# Patient Record
Sex: FEMALE | HISPANIC OR LATINO | Employment: OTHER | URBAN - METROPOLITAN AREA
[De-identification: names, ages, dates, MRNs, and addresses within clinical notes are randomized per-mention and may not be internally consistent; named-entity substitution may affect disease eponyms.]

---

## 2017-01-12 ENCOUNTER — COMMUNICATION - HEALTHEAST (OUTPATIENT)
Dept: INTERNAL MEDICINE | Facility: CLINIC | Age: 62
End: 2017-01-12

## 2017-01-12 DIAGNOSIS — I10 HTN (HYPERTENSION): ICD-10-CM

## 2017-03-21 ENCOUNTER — COMMUNICATION - HEALTHEAST (OUTPATIENT)
Dept: INTERNAL MEDICINE | Facility: CLINIC | Age: 62
End: 2017-03-21

## 2017-03-21 DIAGNOSIS — K21.9 GERD (GASTROESOPHAGEAL REFLUX DISEASE): ICD-10-CM

## 2017-05-31 ENCOUNTER — COMMUNICATION - HEALTHEAST (OUTPATIENT)
Dept: INTERNAL MEDICINE | Facility: CLINIC | Age: 62
End: 2017-05-31

## 2017-07-24 ENCOUNTER — COMMUNICATION - HEALTHEAST (OUTPATIENT)
Dept: INTERNAL MEDICINE | Facility: CLINIC | Age: 62
End: 2017-07-24

## 2017-07-25 ENCOUNTER — OFFICE VISIT - HEALTHEAST (OUTPATIENT)
Dept: INTERNAL MEDICINE | Facility: CLINIC | Age: 62
End: 2017-07-25

## 2017-07-25 ENCOUNTER — AMBULATORY - HEALTHEAST (OUTPATIENT)
Dept: INTERNAL MEDICINE | Facility: CLINIC | Age: 62
End: 2017-07-25

## 2017-07-25 ENCOUNTER — COMMUNICATION - HEALTHEAST (OUTPATIENT)
Dept: INTERNAL MEDICINE | Facility: CLINIC | Age: 62
End: 2017-07-25

## 2017-07-25 ENCOUNTER — HOSPITAL ENCOUNTER (OUTPATIENT)
Dept: ULTRASOUND IMAGING | Facility: CLINIC | Age: 62
Discharge: HOME | End: 2017-07-25
Attending: INTERNAL MEDICINE

## 2017-07-25 DIAGNOSIS — K80.20 CHOLELITHIASES: ICD-10-CM

## 2017-07-25 DIAGNOSIS — R10.11 RUQ PAIN: ICD-10-CM

## 2017-07-25 DIAGNOSIS — K21.9 GASTROESOPHAGEAL REFLUX DISEASE WITHOUT ESOPHAGITIS: ICD-10-CM

## 2017-07-25 DIAGNOSIS — K80.20 CHOLELITHIASIS: ICD-10-CM

## 2017-07-25 ASSESSMENT — MIFFLIN-ST. JEOR: SCORE: 1617.3

## 2017-07-26 ENCOUNTER — COMMUNICATION - HEALTHEAST (OUTPATIENT)
Dept: INTERNAL MEDICINE | Facility: CLINIC | Age: 62
End: 2017-07-26

## 2017-07-31 ENCOUNTER — RECORDS - HEALTHEAST (OUTPATIENT)
Dept: ADMINISTRATIVE | Facility: OTHER | Age: 62
End: 2017-07-31

## 2017-08-02 ENCOUNTER — SURGERY - HEALTHEAST (OUTPATIENT)
Dept: SURGERY | Facility: CLINIC | Age: 62
End: 2017-08-02

## 2017-08-02 ENCOUNTER — ANESTHESIA - HEALTHEAST (OUTPATIENT)
Dept: SURGERY | Facility: CLINIC | Age: 62
End: 2017-08-02

## 2017-08-02 ASSESSMENT — MIFFLIN-ST. JEOR: SCORE: 1594.62

## 2017-10-09 ENCOUNTER — COMMUNICATION - HEALTHEAST (OUTPATIENT)
Dept: INTERNAL MEDICINE | Facility: CLINIC | Age: 62
End: 2017-10-09

## 2017-10-20 ENCOUNTER — COMMUNICATION - HEALTHEAST (OUTPATIENT)
Dept: INTERNAL MEDICINE | Facility: CLINIC | Age: 62
End: 2017-10-20

## 2017-10-20 DIAGNOSIS — I10 HYPERTENSION: ICD-10-CM

## 2017-11-10 ENCOUNTER — RECORDS - HEALTHEAST (OUTPATIENT)
Dept: MAMMOGRAPHY | Facility: CLINIC | Age: 62
End: 2017-11-10

## 2017-11-10 ENCOUNTER — OFFICE VISIT - HEALTHEAST (OUTPATIENT)
Dept: INTERNAL MEDICINE | Facility: CLINIC | Age: 62
End: 2017-11-10

## 2017-11-10 DIAGNOSIS — Z00.00 HEALTH CARE MAINTENANCE: ICD-10-CM

## 2017-11-10 DIAGNOSIS — E78.5 HYPERLIPIDEMIA: ICD-10-CM

## 2017-11-10 DIAGNOSIS — Z12.31 ENCOUNTER FOR SCREENING MAMMOGRAM FOR MALIGNANT NEOPLASM OF BREAST: ICD-10-CM

## 2017-11-10 DIAGNOSIS — E55.9 VITAMIN D DEFICIENCY: ICD-10-CM

## 2017-11-10 DIAGNOSIS — I10 ESSENTIAL HYPERTENSION: ICD-10-CM

## 2017-11-10 LAB
CHOLEST SERPL-MCNC: 256 MG/DL
FASTING STATUS PATIENT QL REPORTED: YES
HDLC SERPL-MCNC: 55 MG/DL
LDLC SERPL CALC-MCNC: 168 MG/DL
TRIGL SERPL-MCNC: 165 MG/DL

## 2017-11-10 ASSESSMENT — MIFFLIN-ST. JEOR: SCORE: 1614.69

## 2018-02-12 ENCOUNTER — TELEPHONE (OUTPATIENT)
Dept: OTHER | Facility: CLINIC | Age: 63
End: 2018-02-12

## 2018-02-12 NOTE — TELEPHONE ENCOUNTER
2/12/2018    Call Regarding Onboarding P1 MMB    Attempt 1    Message on answering machine    Comments:       Outreach   SB

## 2018-02-22 ENCOUNTER — COMMUNICATION - HEALTHEAST (OUTPATIENT)
Dept: INTERNAL MEDICINE | Facility: CLINIC | Age: 63
End: 2018-02-22

## 2018-02-23 ENCOUNTER — COMMUNICATION - HEALTHEAST (OUTPATIENT)
Dept: INTERNAL MEDICINE | Facility: CLINIC | Age: 63
End: 2018-02-23

## 2018-03-20 NOTE — TELEPHONE ENCOUNTER
3/20/2018    Call Regarding Onboarding P1 MMB    Attempt 2    Message on voicemail     Comments:       Outreach   Dariana Schroeder

## 2018-04-19 NOTE — TELEPHONE ENCOUNTER
4/19/2018      Call Regarding Onboarding P1 MMB     Attempt 3     Message on voicemail      Comments:       Outreach   BELKIS FOX

## 2018-07-13 ENCOUNTER — OFFICE VISIT - HEALTHEAST (OUTPATIENT)
Dept: FAMILY MEDICINE | Facility: CLINIC | Age: 63
End: 2018-07-13

## 2018-07-13 DIAGNOSIS — M25.572 PAIN IN JOINT INVOLVING ANKLE AND FOOT, LEFT: ICD-10-CM

## 2018-07-21 ENCOUNTER — COMMUNICATION - HEALTHEAST (OUTPATIENT)
Dept: INTERNAL MEDICINE | Facility: CLINIC | Age: 63
End: 2018-07-21

## 2018-07-21 DIAGNOSIS — I10 ESSENTIAL HYPERTENSION: ICD-10-CM

## 2018-07-31 ENCOUNTER — OFFICE VISIT - HEALTHEAST (OUTPATIENT)
Dept: INTERNAL MEDICINE | Facility: CLINIC | Age: 63
End: 2018-07-31

## 2018-07-31 DIAGNOSIS — M25.571 PAIN IN JOINT INVOLVING ANKLE AND FOOT, RIGHT: ICD-10-CM

## 2018-08-15 ENCOUNTER — RECORDS - HEALTHEAST (OUTPATIENT)
Dept: ADMINISTRATIVE | Facility: OTHER | Age: 63
End: 2018-08-15

## 2018-09-29 ENCOUNTER — COMMUNICATION - HEALTHEAST (OUTPATIENT)
Dept: INTERNAL MEDICINE | Facility: CLINIC | Age: 63
End: 2018-09-29

## 2018-09-29 DIAGNOSIS — K21.9 GASTROESOPHAGEAL REFLUX DISEASE WITHOUT ESOPHAGITIS: ICD-10-CM

## 2018-10-22 ENCOUNTER — COMMUNICATION - HEALTHEAST (OUTPATIENT)
Dept: INTERNAL MEDICINE | Facility: CLINIC | Age: 63
End: 2018-10-22

## 2018-11-27 ENCOUNTER — RECORDS - HEALTHEAST (OUTPATIENT)
Dept: MAMMOGRAPHY | Facility: CLINIC | Age: 63
End: 2018-11-27

## 2018-11-27 ENCOUNTER — OFFICE VISIT - HEALTHEAST (OUTPATIENT)
Dept: INTERNAL MEDICINE | Facility: CLINIC | Age: 63
End: 2018-11-27

## 2018-11-27 DIAGNOSIS — E78.5 HYPERLIPIDEMIA LDL GOAL <130: ICD-10-CM

## 2018-11-27 DIAGNOSIS — Z00.00 ANNUAL PHYSICAL EXAM: ICD-10-CM

## 2018-11-27 DIAGNOSIS — K21.9 GASTROESOPHAGEAL REFLUX DISEASE WITHOUT ESOPHAGITIS: ICD-10-CM

## 2018-11-27 DIAGNOSIS — E55.9 VITAMIN D DEFICIENCY: ICD-10-CM

## 2018-11-27 DIAGNOSIS — I10 ESSENTIAL HYPERTENSION: ICD-10-CM

## 2018-11-27 DIAGNOSIS — Z12.31 ENCOUNTER FOR SCREENING MAMMOGRAM FOR MALIGNANT NEOPLASM OF BREAST: ICD-10-CM

## 2018-11-29 ENCOUNTER — AMBULATORY - HEALTHEAST (OUTPATIENT)
Dept: LAB | Facility: CLINIC | Age: 63
End: 2018-11-29

## 2018-11-29 DIAGNOSIS — I10 ESSENTIAL HYPERTENSION: ICD-10-CM

## 2018-11-29 DIAGNOSIS — K21.9 GASTROESOPHAGEAL REFLUX DISEASE WITHOUT ESOPHAGITIS: ICD-10-CM

## 2018-11-29 DIAGNOSIS — E78.5 HYPERLIPIDEMIA LDL GOAL <130: ICD-10-CM

## 2018-11-29 DIAGNOSIS — E55.9 VITAMIN D DEFICIENCY: ICD-10-CM

## 2018-11-29 LAB
ALBUMIN SERPL-MCNC: 3.5 G/DL (ref 3.5–5)
ALP SERPL-CCNC: 102 U/L (ref 45–120)
ALT SERPL W P-5'-P-CCNC: 15 U/L (ref 0–45)
ANION GAP SERPL CALCULATED.3IONS-SCNC: 11 MMOL/L (ref 5–18)
AST SERPL W P-5'-P-CCNC: 15 U/L (ref 0–40)
BILIRUB SERPL-MCNC: 0.6 MG/DL (ref 0–1)
BUN SERPL-MCNC: 28 MG/DL (ref 8–22)
CALCIUM SERPL-MCNC: 9.9 MG/DL (ref 8.5–10.5)
CHLORIDE BLD-SCNC: 106 MMOL/L (ref 98–107)
CHOLEST SERPL-MCNC: 253 MG/DL
CO2 SERPL-SCNC: 24 MMOL/L (ref 22–31)
CREAT SERPL-MCNC: 1.06 MG/DL (ref 0.6–1.1)
ERYTHROCYTE [DISTWIDTH] IN BLOOD BY AUTOMATED COUNT: 10.9 % (ref 11–14.5)
FASTING STATUS PATIENT QL REPORTED: YES
GFR SERPL CREATININE-BSD FRML MDRD: 53 ML/MIN/1.73M2
GLUCOSE BLD-MCNC: 92 MG/DL (ref 70–125)
HCT VFR BLD AUTO: 35.2 % (ref 35–47)
HDLC SERPL-MCNC: 66 MG/DL
HGB BLD-MCNC: 11.9 G/DL (ref 12–16)
LDLC SERPL CALC-MCNC: 165 MG/DL
MCH RBC QN AUTO: 30.8 PG (ref 27–34)
MCHC RBC AUTO-ENTMCNC: 33.9 G/DL (ref 32–36)
MCV RBC AUTO: 91 FL (ref 80–100)
PLATELET # BLD AUTO: 277 THOU/UL (ref 140–440)
PMV BLD AUTO: 7.7 FL (ref 7–10)
POTASSIUM BLD-SCNC: 3.7 MMOL/L (ref 3.5–5)
PROT SERPL-MCNC: 6.8 G/DL (ref 6–8)
RBC # BLD AUTO: 3.87 MILL/UL (ref 3.8–5.4)
SODIUM SERPL-SCNC: 141 MMOL/L (ref 136–145)
TRIGL SERPL-MCNC: 109 MG/DL
TSH SERPL DL<=0.005 MIU/L-ACNC: 1.17 UIU/ML (ref 0.3–5)
VIT B12 SERPL-MCNC: 229 PG/ML (ref 213–816)
WBC: 6.1 THOU/UL (ref 4–11)

## 2018-11-30 ENCOUNTER — COMMUNICATION - HEALTHEAST (OUTPATIENT)
Dept: INTERNAL MEDICINE | Facility: CLINIC | Age: 63
End: 2018-11-30

## 2018-11-30 DIAGNOSIS — D64.9 ANEMIA, UNSPECIFIED TYPE: ICD-10-CM

## 2018-11-30 LAB
25(OH)D3 SERPL-MCNC: 42.1 NG/ML (ref 30–80)
25(OH)D3 SERPL-MCNC: 42.1 NG/ML (ref 30–80)

## 2018-12-05 ENCOUNTER — HOSPITAL ENCOUNTER (OUTPATIENT)
Dept: MAMMOGRAPHY | Facility: CLINIC | Age: 63
Discharge: HOME | End: 2018-12-05
Attending: INTERNAL MEDICINE

## 2018-12-05 DIAGNOSIS — N63.0 BREAST MASS: ICD-10-CM

## 2018-12-23 ENCOUNTER — OFFICE VISIT - HEALTHEAST (OUTPATIENT)
Dept: FAMILY MEDICINE | Facility: CLINIC | Age: 63
End: 2018-12-23

## 2018-12-23 DIAGNOSIS — B02.23 SHINGLES (HERPES ZOSTER) POLYNEUROPATHY: ICD-10-CM

## 2019-10-20 ENCOUNTER — COMMUNICATION - HEALTHEAST (OUTPATIENT)
Dept: INTERNAL MEDICINE | Facility: CLINIC | Age: 64
End: 2019-10-20

## 2019-10-20 DIAGNOSIS — K21.9 GASTROESOPHAGEAL REFLUX DISEASE WITHOUT ESOPHAGITIS: ICD-10-CM

## 2019-11-04 ENCOUNTER — COMMUNICATION - HEALTHEAST (OUTPATIENT)
Dept: INTERNAL MEDICINE | Facility: CLINIC | Age: 64
End: 2019-11-04

## 2019-11-05 ENCOUNTER — OFFICE VISIT - HEALTHEAST (OUTPATIENT)
Dept: AUDIOLOGY | Facility: CLINIC | Age: 64
End: 2019-11-05

## 2019-11-05 DIAGNOSIS — H90.3 SENSORINEURAL HEARING LOSS, BILATERAL: ICD-10-CM

## 2019-11-06 ENCOUNTER — COMMUNICATION - HEALTHEAST (OUTPATIENT)
Dept: INTERNAL MEDICINE | Facility: CLINIC | Age: 64
End: 2019-11-06

## 2019-11-06 DIAGNOSIS — I10 ESSENTIAL HYPERTENSION: ICD-10-CM

## 2019-11-11 ENCOUNTER — OFFICE VISIT - HEALTHEAST (OUTPATIENT)
Dept: INTERNAL MEDICINE | Facility: CLINIC | Age: 64
End: 2019-11-11

## 2019-11-11 DIAGNOSIS — G89.29 CHRONIC PAIN OF LEFT KNEE: ICD-10-CM

## 2019-11-11 DIAGNOSIS — K21.9 GASTROESOPHAGEAL REFLUX DISEASE WITHOUT ESOPHAGITIS: ICD-10-CM

## 2019-11-11 DIAGNOSIS — M25.562 CHRONIC PAIN OF LEFT KNEE: ICD-10-CM

## 2019-11-11 DIAGNOSIS — E55.9 VITAMIN D DEFICIENCY: ICD-10-CM

## 2019-11-11 DIAGNOSIS — E78.5 HYPERLIPIDEMIA LDL GOAL <130: ICD-10-CM

## 2019-11-11 DIAGNOSIS — I10 ESSENTIAL HYPERTENSION: ICD-10-CM

## 2019-11-11 DIAGNOSIS — Z00.00 ANNUAL PHYSICAL EXAM: ICD-10-CM

## 2019-11-11 DIAGNOSIS — D64.9 ANEMIA, UNSPECIFIED TYPE: ICD-10-CM

## 2019-11-11 DIAGNOSIS — Z23 NEED FOR VACCINATION: ICD-10-CM

## 2019-11-11 LAB
ALBUMIN SERPL-MCNC: 3.7 G/DL (ref 3.5–5)
ALP SERPL-CCNC: 108 U/L (ref 45–120)
ALT SERPL W P-5'-P-CCNC: 12 U/L (ref 0–45)
ANION GAP SERPL CALCULATED.3IONS-SCNC: 10 MMOL/L (ref 5–18)
AST SERPL W P-5'-P-CCNC: 16 U/L (ref 0–40)
BASOPHILS # BLD AUTO: 0.1 THOU/UL (ref 0–0.2)
BASOPHILS NFR BLD AUTO: 1 % (ref 0–2)
BILIRUB SERPL-MCNC: 0.5 MG/DL (ref 0–1)
BUN SERPL-MCNC: 27 MG/DL (ref 8–22)
CALCIUM SERPL-MCNC: 10 MG/DL (ref 8.5–10.5)
CHLORIDE BLD-SCNC: 107 MMOL/L (ref 98–107)
CHOLEST SERPL-MCNC: 262 MG/DL
CO2 SERPL-SCNC: 24 MMOL/L (ref 22–31)
CREAT SERPL-MCNC: 1.03 MG/DL (ref 0.6–1.1)
EOSINOPHIL # BLD AUTO: 0.3 THOU/UL (ref 0–0.4)
EOSINOPHIL NFR BLD AUTO: 3 % (ref 0–6)
ERYTHROCYTE [DISTWIDTH] IN BLOOD BY AUTOMATED COUNT: 10.8 % (ref 11–14.5)
FASTING STATUS PATIENT QL REPORTED: YES
GFR SERPL CREATININE-BSD FRML MDRD: 54 ML/MIN/1.73M2
GLUCOSE BLD-MCNC: 98 MG/DL (ref 70–125)
HCT VFR BLD AUTO: 38.1 % (ref 35–47)
HDLC SERPL-MCNC: 61 MG/DL
HGB BLD-MCNC: 12.7 G/DL (ref 12–16)
IRON SATN MFR SERPL: 37 % (ref 20–50)
IRON SERPL-MCNC: 117 UG/DL (ref 42–175)
LDLC SERPL CALC-MCNC: 173 MG/DL
LYMPHOCYTES # BLD AUTO: 2.2 THOU/UL (ref 0.8–4.4)
LYMPHOCYTES NFR BLD AUTO: 26 % (ref 20–40)
MCH RBC QN AUTO: 31 PG (ref 27–34)
MCHC RBC AUTO-ENTMCNC: 33.5 G/DL (ref 32–36)
MCV RBC AUTO: 93 FL (ref 80–100)
MONOCYTES # BLD AUTO: 0.5 THOU/UL (ref 0–0.9)
MONOCYTES NFR BLD AUTO: 5 % (ref 2–10)
NEUTROPHILS # BLD AUTO: 5.5 THOU/UL (ref 2–7.7)
NEUTROPHILS NFR BLD AUTO: 64 % (ref 50–70)
PLATELET # BLD AUTO: 270 THOU/UL (ref 140–440)
PMV BLD AUTO: 7.7 FL (ref 7–10)
POTASSIUM BLD-SCNC: 4 MMOL/L (ref 3.5–5)
PROT SERPL-MCNC: 7.6 G/DL (ref 6–8)
RBC # BLD AUTO: 4.11 MILL/UL (ref 3.8–5.4)
SODIUM SERPL-SCNC: 141 MMOL/L (ref 136–145)
TIBC SERPL-MCNC: 319 UG/DL (ref 313–563)
TRANSFERRIN SERPL-MCNC: 255 MG/DL (ref 212–360)
TRIGL SERPL-MCNC: 140 MG/DL
VIT B12 SERPL-MCNC: 558 PG/ML (ref 213–816)
WBC: 8.5 THOU/UL (ref 4–11)

## 2019-11-11 ASSESSMENT — MIFFLIN-ST. JEOR: SCORE: 1568.65

## 2019-11-12 LAB
25(OH)D3 SERPL-MCNC: 35.9 NG/ML (ref 30–80)
25(OH)D3 SERPL-MCNC: 35.9 NG/ML (ref 30–80)

## 2019-11-18 ENCOUNTER — COMMUNICATION - HEALTHEAST (OUTPATIENT)
Dept: INTERNAL MEDICINE | Facility: CLINIC | Age: 64
End: 2019-11-18

## 2019-11-18 DIAGNOSIS — E78.5 HYPERLIPIDEMIA LDL GOAL <130: ICD-10-CM

## 2019-11-18 RX ORDER — ATORVASTATIN CALCIUM 10 MG/1
10 TABLET, FILM COATED ORAL DAILY
Qty: 30 TABLET | Refills: 11 | Status: SHIPPED | OUTPATIENT
Start: 2019-11-18 | End: 2021-10-25

## 2019-11-25 ENCOUNTER — RECORDS - HEALTHEAST (OUTPATIENT)
Dept: ADMINISTRATIVE | Facility: OTHER | Age: 64
End: 2019-11-25

## 2019-11-29 ENCOUNTER — HOSPITAL ENCOUNTER (OUTPATIENT)
Dept: MAMMOGRAPHY | Facility: CLINIC | Age: 64
Discharge: HOME | End: 2019-11-29
Attending: INTERNAL MEDICINE

## 2019-11-29 DIAGNOSIS — Z12.31 VISIT FOR SCREENING MAMMOGRAM: ICD-10-CM

## 2020-01-08 ENCOUNTER — COMMUNICATION - HEALTHEAST (OUTPATIENT)
Dept: INTERNAL MEDICINE | Facility: CLINIC | Age: 65
End: 2020-01-08

## 2020-01-09 ENCOUNTER — COMMUNICATION - HEALTHEAST (OUTPATIENT)
Dept: INTERNAL MEDICINE | Facility: CLINIC | Age: 65
End: 2020-01-09

## 2020-01-14 ENCOUNTER — OFFICE VISIT - HEALTHEAST (OUTPATIENT)
Dept: FAMILY MEDICINE | Facility: CLINIC | Age: 65
End: 2020-01-14

## 2020-01-14 ENCOUNTER — COMMUNICATION - HEALTHEAST (OUTPATIENT)
Dept: SCHEDULING | Facility: CLINIC | Age: 65
End: 2020-01-14

## 2020-01-14 DIAGNOSIS — J20.9 ACUTE BRONCHITIS, UNSPECIFIED ORGANISM: ICD-10-CM

## 2020-01-14 RX ORDER — ALBUTEROL SULFATE 90 UG/1
2 AEROSOL, METERED RESPIRATORY (INHALATION) EVERY 6 HOURS PRN
Qty: 1 EACH | Refills: 0 | Status: SHIPPED | OUTPATIENT
Start: 2020-01-14 | End: 2023-01-23 | Stop reason: ALTCHOICE

## 2020-01-14 RX ORDER — BENZONATATE 100 MG/1
100 CAPSULE ORAL 3 TIMES DAILY PRN
Qty: 30 CAPSULE | Refills: 0 | Status: SHIPPED | OUTPATIENT
Start: 2020-01-14 | End: 2021-10-20

## 2020-01-28 ENCOUNTER — COMMUNICATION - HEALTHEAST (OUTPATIENT)
Dept: INTERNAL MEDICINE | Facility: CLINIC | Age: 65
End: 2020-01-28

## 2020-01-28 DIAGNOSIS — E78.5 HYPERLIPIDEMIA LDL GOAL <130: ICD-10-CM

## 2020-01-28 DIAGNOSIS — M79.10 MUSCLE ACHE: ICD-10-CM

## 2020-01-30 ENCOUNTER — AMBULATORY - HEALTHEAST (OUTPATIENT)
Dept: LAB | Facility: CLINIC | Age: 65
End: 2020-01-30

## 2020-01-30 DIAGNOSIS — E78.5 HYPERLIPIDEMIA LDL GOAL <130: ICD-10-CM

## 2020-01-30 DIAGNOSIS — M79.10 MUSCLE ACHE: ICD-10-CM

## 2020-01-31 LAB
ALBUMIN SERPL-MCNC: 3.7 G/DL (ref 3.5–5)
ALP SERPL-CCNC: 115 U/L (ref 45–120)
ALT SERPL W P-5'-P-CCNC: 10 U/L (ref 0–45)
AST SERPL W P-5'-P-CCNC: 12 U/L (ref 0–40)
BILIRUB DIRECT SERPL-MCNC: 0.1 MG/DL
BILIRUB SERPL-MCNC: 0.3 MG/DL (ref 0–1)
CK SERPL-CCNC: 121 U/L (ref 30–190)
PROT SERPL-MCNC: 7.2 G/DL (ref 6–8)
TSH SERPL DL<=0.005 MIU/L-ACNC: 0.89 UIU/ML (ref 0.3–5)

## 2020-02-05 ENCOUNTER — COMMUNICATION - HEALTHEAST (OUTPATIENT)
Dept: INTERNAL MEDICINE | Facility: CLINIC | Age: 65
End: 2020-02-05

## 2020-02-05 DIAGNOSIS — I10 ESSENTIAL HYPERTENSION: ICD-10-CM

## 2020-03-05 ENCOUNTER — COMMUNICATION - HEALTHEAST (OUTPATIENT)
Dept: SCHEDULING | Facility: CLINIC | Age: 65
End: 2020-03-05

## 2020-03-05 ENCOUNTER — COMMUNICATION - HEALTHEAST (OUTPATIENT)
Dept: INTERNAL MEDICINE | Facility: CLINIC | Age: 65
End: 2020-03-05

## 2020-03-05 ENCOUNTER — OFFICE VISIT - HEALTHEAST (OUTPATIENT)
Dept: INTERNAL MEDICINE | Facility: CLINIC | Age: 65
End: 2020-03-05

## 2020-03-05 DIAGNOSIS — R19.7 DIARRHEA, UNSPECIFIED TYPE: ICD-10-CM

## 2020-03-05 DIAGNOSIS — I10 ESSENTIAL HYPERTENSION: ICD-10-CM

## 2020-03-05 DIAGNOSIS — R11.2 NAUSEA AND VOMITING, INTRACTABILITY OF VOMITING NOT SPECIFIED, UNSPECIFIED VOMITING TYPE: ICD-10-CM

## 2020-03-05 LAB
ANION GAP SERPL CALCULATED.3IONS-SCNC: 14 MMOL/L (ref 5–18)
BUN SERPL-MCNC: 26 MG/DL (ref 8–22)
CALCIUM SERPL-MCNC: 9.4 MG/DL (ref 8.5–10.5)
CHLORIDE BLD-SCNC: 108 MMOL/L (ref 98–107)
CO2 SERPL-SCNC: 18 MMOL/L (ref 22–31)
CREAT SERPL-MCNC: 1.42 MG/DL (ref 0.6–1.1)
GFR SERPL CREATININE-BSD FRML MDRD: 37 ML/MIN/1.73M2
GLUCOSE BLD-MCNC: 94 MG/DL (ref 70–125)
POTASSIUM BLD-SCNC: 3.2 MMOL/L (ref 3.5–5)
SODIUM SERPL-SCNC: 140 MMOL/L (ref 136–145)

## 2020-03-05 ASSESSMENT — MIFFLIN-ST. JEOR: SCORE: 1528.28

## 2020-03-06 ENCOUNTER — COMMUNICATION - HEALTHEAST (OUTPATIENT)
Dept: INTERNAL MEDICINE | Facility: CLINIC | Age: 65
End: 2020-03-06

## 2020-03-06 DIAGNOSIS — E87.6 HYPOKALEMIA: ICD-10-CM

## 2020-03-09 ENCOUNTER — COMMUNICATION - HEALTHEAST (OUTPATIENT)
Dept: INTERNAL MEDICINE | Facility: CLINIC | Age: 65
End: 2020-03-09

## 2020-03-09 DIAGNOSIS — R35.0 URINARY FREQUENCY: ICD-10-CM

## 2020-03-10 ENCOUNTER — AMBULATORY - HEALTHEAST (OUTPATIENT)
Dept: LAB | Facility: CLINIC | Age: 65
End: 2020-03-10

## 2020-03-10 DIAGNOSIS — R35.0 URINARY FREQUENCY: ICD-10-CM

## 2020-03-10 LAB
ALBUMIN UR-MCNC: NEGATIVE MG/DL
APPEARANCE UR: CLEAR
BACTERIA #/AREA URNS HPF: ABNORMAL HPF
BILIRUB UR QL STRIP: NEGATIVE
COLOR UR AUTO: YELLOW
GLUCOSE UR STRIP-MCNC: NEGATIVE MG/DL
HGB UR QL STRIP: NEGATIVE
KETONES UR STRIP-MCNC: NEGATIVE MG/DL
LEUKOCYTE ESTERASE UR QL STRIP: ABNORMAL
NITRATE UR QL: NEGATIVE
PH UR STRIP: 6 [PH] (ref 5–8)
RBC #/AREA URNS AUTO: ABNORMAL HPF
SP GR UR STRIP: 1.02 (ref 1–1.03)
SQUAMOUS #/AREA URNS AUTO: ABNORMAL LPF
UROBILINOGEN UR STRIP-ACNC: ABNORMAL
WBC #/AREA URNS AUTO: ABNORMAL HPF

## 2020-03-11 ENCOUNTER — COMMUNICATION - HEALTHEAST (OUTPATIENT)
Dept: INTERNAL MEDICINE | Facility: CLINIC | Age: 65
End: 2020-03-11

## 2020-03-11 LAB — BACTERIA SPEC CULT: NO GROWTH

## 2020-04-26 ENCOUNTER — COMMUNICATION - HEALTHEAST (OUTPATIENT)
Dept: INTERNAL MEDICINE | Facility: CLINIC | Age: 65
End: 2020-04-26

## 2020-04-26 DIAGNOSIS — K21.9 GASTROESOPHAGEAL REFLUX DISEASE WITHOUT ESOPHAGITIS: ICD-10-CM

## 2020-07-11 ENCOUNTER — AMBULATORY - HEALTHEAST (OUTPATIENT)
Dept: FAMILY MEDICINE | Facility: CLINIC | Age: 65
End: 2020-07-11

## 2020-07-11 ENCOUNTER — VIRTUAL VISIT (OUTPATIENT)
Dept: FAMILY MEDICINE | Facility: OTHER | Age: 65
End: 2020-07-11

## 2020-07-11 DIAGNOSIS — Z20.822 SUSPECTED COVID-19 VIRUS INFECTION: ICD-10-CM

## 2020-07-11 NOTE — PROGRESS NOTES
"Date: 2020 09:05:18  Clinician: Baylee Cadena  Clinician NPI: [id number]  Patient: Leeann Mckeon  Patient : 1955  Patient Address: [address] [place] [address]  Patient Phone: [phone]  Visit Protocol: URI  Patient Summary:  Leeann is a 64 year old ( : 1955 ) female who initiated a Visit for COVID-19 (Coronavirus) evaluation and screening. When asked the question \"Please sign me up to receive news, health information and promotions from [employer]", Leeann responded \"Yes\".    Leeann states her symptoms started 1-2 days ago.   Her symptoms consist of rhinitis, malaise, a headache, myalgia, and nasal congestion.   Symptom details     Nasal secretions: The color of her mucus is clear.    Headache: She states the headache is mild (1-3 on a 10 point pain scale).      Leeann denies having wheezing, nausea, teeth pain, ageusia, diarrhea, vomiting, ear pain, chills, sore throat, enlarged lymph nodes, anosmia, facial pain or pressure, fever, and cough. She also denies having recent facial or sinus surgery in the past 60 days and taking antibiotic medication in the past month. She is not experiencing dyspnea.   Precipitating events  She has not recently been exposed to someone with influenza. Leeann has been in close contact with the following high risk individuals: children under the age of 5.   Pertinent COVID-19 (Coronavirus) information  In the past 14 days, Leeann has not worked in a congregate living setting.   She does not work or volunteer as healthcare worker or a  and does not work or volunteer in a healthcare facility.   Leeann also has not lived in a congregate living setting in the past 14 days. She does not live with a healthcare worker.   Leeann has not had a close contact with a laboratory-confirmed COVID-19 patient within 14 days of symptom onset.   Pertinent medical history  Leeann does not get yeast infections when she takes antibiotics.   Leeann does not need a return to " work/school note.   Weight: 232 lbs   Leeann does not smoke or use smokeless tobacco.   Additional information as reported by the patient (free text): I have allergies and was taking over the counter medication. I stopped because I was getting vertigo. I have been dealing with the allergies since the end of April, but the body aches caused me some concern. I don't get that with my allergies.   Weight: 232 lbs    MEDICATIONS: Advil oral, omeprazole oral, lisinopril-hydrochlorothiazide oral, ALLERGIES: Sulfa (Sulfonamide Antibiotics)  Clinician Response:  Dear Leeann,   Your symptoms show that you may have coronavirus (COVID-19). This illness can cause fever, cough and trouble breathing. Many people get a mild case and get better on their own. Some people can get very sick.  What should I do?  We would like to test you for this virus.   1. Please call [phone] to schedule your visit. Explain that you were referred by [facility] to have a COVID-19 test. Be [facility].  The following will serve as your written order for this COVID Test, ordered by me, for the indication of suspected COVID [Z20.828]: The test will be ordered in [employer], our electronic health record, after you are scheduled. It will show as ordered and authorized by Delfin Senior MD.  Order: COVID-19 (Coronavirus) PCR for SYMPTOMATIC testing from [facility].    2. When it's time for your COVID test:  Stay at least 6 feet away from others. (If someone will drive you to your test, stay in the backseat, as far away from the  as you can.)   Cover your mouth and nose with a mask, tissue or washcloth.  Go straight to the testing site. Don't make any stops on the way there or back.    3.Starting now: Stay home and away from others (self-isolate) until:   You've had no fever---and no medicine that reduces fever---for 3 full days (72 hours). And...  Your other symptoms have gotten better. For example, your cough or breathing has improved.  "And...  At least 10 days have passed since your symptoms started.    During this time, don't leave the house except for testing or medical care.   Stay in your own room, even for meals. Use your own bathroom if you can.   Stay away from others in your home. No hugging, kissing or shaking hands. No visitors.  Don't go to work, school or anywhere else.    Clean \"high touch\" surfaces often (doorknobs, counters, handles, etc.). Use a household cleaning spray or wipes. You'll find a full list of  on the EPA website: www.epa.gov/pesticide-registration/list-n-disinfectants-use-against-sars-cov-2.   Cover your mouth and nose with a mask, tissue or washcloth to avoid spreading germs.  Wash your hands and face often. Use soap and water.  Caregivers in these groups are at risk for severe illness due to COVID-19:  o People 65 years and older  o People who live in a nursing home or long-term care facility  o People with chronic disease (lung, heart, cancer, diabetes, kidney, liver, immunologic)  o People who have a weakened immune system, including those who:   Are in cancer treatment  Take medicine that weakens the immune system, such as corticosteroids  Had a bone marrow or organ transplant  Have an immune deficiency  Have poorly controlled HIV or AIDS  Are obese (body mass index of 40 or higher)  Smoke regularly   o Caregivers should wear gloves while washing dishes, handling laundry and cleaning bedrooms and bathrooms.  o Use caution when washing and drying laundry: Don't shake dirty laundry, and use the warmest water setting that you can.  o For more tips, go to www.cdc.gov/coronavirus/2019-ncov/downloads/10Things.pdf.   4.Sign up for [employer]. We know it's scary to hear that you might have COVID-19. We want to track your symptoms to make sure you're okay over the next 2 weeks. Please look for an email from [employer]---this is a free, online program that we'll use to keep in touch. To sign up, follow the link " in the email. Learn more at [email or web address]  How can I take care of myself?   Get lots of rest. Drink extra fluids(unless a doctor has told you not to).  Take Tylenol (acetaminophen) for fever or pain. If you have liver or kidney problems, ask your family doctor if it's okay to take Tylenol.   Adults can take either:    650 mg (two 325 mg pills) every 4 to 6 hours, or...  1,000 mg (two 500 mg pills) every 8 hours as needed.   Note: Don't take more than 3,000 mg in one day. Acetaminophen is found in many medicines (both prescribed and over-the-counter medicines). Read all labels to be sure you don't take too much.   For children, check the Tylenol bottle for the right dose. The dose is based on the child's age or weight.    If you have other health problems (like cancer, heart failure, an organ transplant or severe kidney disease):Call your specialty clinic if you don't feel better in the next 2 days.    Know when to call 911. Emergency warning signs include:   Trouble breathing or shortness of breath Pain or pressure in the chest that doesn't go away Feeling confused like you haven't felt before, or not being able to wake up Bluish-colored lips or face.  Where can I get more information?   [facility] -- About COVID-19: [email or web address]  CDC -- What to Do If You're Sick: www.cdc.gov/coronavirus/2019-ncov/about/steps-when-sick.html  CDC -- Ending Home Isolation: www.cdc.gov/coronavirus/2019-ncov/hcp/disposition-in-home-patients.html  CDC -- Caring for Someone: www.cdc.gov/coronavirus/2019-ncov/if-you-are-sick/care-for-someone.html  [facility] -- Interim Guidance for Hospital Discharge to Home: www.health.[facility].[place].us/diseases/coronavirus/hcp/hospdischarge.pdf  [facility] clinical trials (COVID-19 research studies): clinicalaffairs.[facility]/n-clinical-trials  Below are the COVID-19 hotlines at the [place] Department of Health ([facility]). Interpreters are available.    For  health questions: Call [phone] or [phone] (7 a.m. to 7 p.m.) For questions about schools and childcare: Call [phone] or [phone] (7 a.m. to 7 p.m.)    Diagnosis: Myalgia  Diagnosis ICD: M79.1

## 2020-07-14 ENCOUNTER — COMMUNICATION - HEALTHEAST (OUTPATIENT)
Dept: INTERNAL MEDICINE | Facility: CLINIC | Age: 65
End: 2020-07-14

## 2020-07-15 ENCOUNTER — OFFICE VISIT - HEALTHEAST (OUTPATIENT)
Dept: INTERNAL MEDICINE | Facility: CLINIC | Age: 65
End: 2020-07-15

## 2020-07-15 ENCOUNTER — COMMUNICATION - HEALTHEAST (OUTPATIENT)
Dept: FAMILY MEDICINE | Facility: CLINIC | Age: 65
End: 2020-07-15

## 2020-07-15 DIAGNOSIS — I10 ESSENTIAL HYPERTENSION: ICD-10-CM

## 2020-07-15 DIAGNOSIS — J01.11 ACUTE RECURRENT FRONTAL SINUSITIS: ICD-10-CM

## 2020-07-15 DIAGNOSIS — J30.1 SEASONAL ALLERGIC RHINITIS DUE TO POLLEN: ICD-10-CM

## 2020-07-17 ENCOUNTER — COMMUNICATION - HEALTHEAST (OUTPATIENT)
Dept: INTERNAL MEDICINE | Facility: CLINIC | Age: 65
End: 2020-07-17

## 2020-07-17 DIAGNOSIS — J01.10 ACUTE FRONTAL SINUSITIS, RECURRENCE NOT SPECIFIED: ICD-10-CM

## 2020-08-25 ENCOUNTER — COMMUNICATION - HEALTHEAST (OUTPATIENT)
Dept: AUDIOLOGY | Facility: CLINIC | Age: 65
End: 2020-08-25

## 2020-08-26 ENCOUNTER — COMMUNICATION - HEALTHEAST (OUTPATIENT)
Dept: AUDIOLOGY | Facility: CLINIC | Age: 65
End: 2020-08-26

## 2020-08-26 ENCOUNTER — COMMUNICATION - HEALTHEAST (OUTPATIENT)
Dept: ADMINISTRATIVE | Facility: CLINIC | Age: 65
End: 2020-08-26

## 2020-11-11 ENCOUNTER — COMMUNICATION - HEALTHEAST (OUTPATIENT)
Dept: INTERNAL MEDICINE | Facility: CLINIC | Age: 65
End: 2020-11-11

## 2020-11-11 DIAGNOSIS — I10 ESSENTIAL HYPERTENSION: ICD-10-CM

## 2020-11-15 RX ORDER — LISINOPRIL AND HYDROCHLOROTHIAZIDE 20; 25 MG/1; MG/1
TABLET ORAL
Qty: 180 TABLET | Refills: 2 | Status: SHIPPED | OUTPATIENT
Start: 2020-11-15 | End: 2021-08-16

## 2020-12-14 ENCOUNTER — OFFICE VISIT - HEALTHEAST (OUTPATIENT)
Dept: INTERNAL MEDICINE | Facility: CLINIC | Age: 65
End: 2020-12-14

## 2020-12-14 ENCOUNTER — RECORDS - HEALTHEAST (OUTPATIENT)
Dept: MAMMOGRAPHY | Facility: CLINIC | Age: 65
End: 2020-12-14

## 2020-12-14 DIAGNOSIS — Z12.31 ENCOUNTER FOR SCREENING MAMMOGRAM FOR MALIGNANT NEOPLASM OF BREAST: ICD-10-CM

## 2020-12-14 DIAGNOSIS — I10 ESSENTIAL HYPERTENSION: ICD-10-CM

## 2020-12-14 DIAGNOSIS — K21.9 GASTROESOPHAGEAL REFLUX DISEASE WITHOUT ESOPHAGITIS: ICD-10-CM

## 2020-12-14 DIAGNOSIS — E55.9 VITAMIN D DEFICIENCY: ICD-10-CM

## 2020-12-14 DIAGNOSIS — E78.5 HYPERLIPIDEMIA LDL GOAL <130: ICD-10-CM

## 2020-12-14 DIAGNOSIS — Z12.4 CERVICAL CANCER SCREENING: ICD-10-CM

## 2020-12-14 DIAGNOSIS — R09.81 NASAL CONGESTION: ICD-10-CM

## 2020-12-14 DIAGNOSIS — Z00.00 ANNUAL PHYSICAL EXAM: ICD-10-CM

## 2020-12-14 DIAGNOSIS — Z12.11 COLON CANCER SCREENING: ICD-10-CM

## 2020-12-14 DIAGNOSIS — N84.1 CERVICAL POLYP: ICD-10-CM

## 2020-12-14 DIAGNOSIS — N89.8 VAGINAL ITCHING: ICD-10-CM

## 2020-12-14 LAB
CLUE CELLS: NORMAL
TRICHOMONAS, WET PREP: NORMAL
YEAST, WET PREP: NORMAL

## 2020-12-14 ASSESSMENT — MIFFLIN-ST. JEOR: SCORE: 1510.14

## 2020-12-15 LAB
HPV SOURCE: NORMAL
HUMAN PAPILLOMA VIRUS 16 DNA: NEGATIVE
HUMAN PAPILLOMA VIRUS 18 DNA: NEGATIVE
HUMAN PAPILLOMA VIRUS FINAL DIAGNOSIS: NORMAL
HUMAN PAPILLOMA VIRUS OTHER HR: NEGATIVE
SPECIMEN DESCRIPTION: NORMAL

## 2020-12-16 ENCOUNTER — AMBULATORY - HEALTHEAST (OUTPATIENT)
Dept: LAB | Facility: CLINIC | Age: 65
End: 2020-12-16

## 2020-12-16 DIAGNOSIS — K21.9 GASTROESOPHAGEAL REFLUX DISEASE WITHOUT ESOPHAGITIS: ICD-10-CM

## 2020-12-16 DIAGNOSIS — I10 ESSENTIAL HYPERTENSION: ICD-10-CM

## 2020-12-16 DIAGNOSIS — E55.9 VITAMIN D DEFICIENCY: ICD-10-CM

## 2020-12-16 DIAGNOSIS — E78.5 HYPERLIPIDEMIA LDL GOAL <130: ICD-10-CM

## 2020-12-16 LAB
ALBUMIN SERPL-MCNC: 3.6 G/DL (ref 3.5–5)
ALP SERPL-CCNC: 88 U/L (ref 45–120)
ALT SERPL W P-5'-P-CCNC: 10 U/L (ref 0–45)
ANION GAP SERPL CALCULATED.3IONS-SCNC: 14 MMOL/L (ref 5–18)
AST SERPL W P-5'-P-CCNC: 13 U/L (ref 0–40)
BASOPHILS # BLD AUTO: 0 THOU/UL (ref 0–0.2)
BASOPHILS NFR BLD AUTO: 1 % (ref 0–2)
BILIRUB SERPL-MCNC: 0.4 MG/DL (ref 0–1)
BUN SERPL-MCNC: 38 MG/DL (ref 8–22)
CALCIUM SERPL-MCNC: 9.4 MG/DL (ref 8.5–10.5)
CHLORIDE BLD-SCNC: 109 MMOL/L (ref 98–107)
CHOLEST SERPL-MCNC: 233 MG/DL
CO2 SERPL-SCNC: 21 MMOL/L (ref 22–31)
CREAT SERPL-MCNC: 1.51 MG/DL (ref 0.6–1.1)
EOSINOPHIL # BLD AUTO: 0.2 THOU/UL (ref 0–0.4)
EOSINOPHIL NFR BLD AUTO: 3 % (ref 0–6)
ERYTHROCYTE [DISTWIDTH] IN BLOOD BY AUTOMATED COUNT: 13.1 % (ref 11–14.5)
FASTING STATUS PATIENT QL REPORTED: YES
GFR SERPL CREATININE-BSD FRML MDRD: 35 ML/MIN/1.73M2
GLUCOSE BLD-MCNC: 82 MG/DL (ref 70–125)
HCT VFR BLD AUTO: 34.1 % (ref 35–47)
HDLC SERPL-MCNC: 57 MG/DL
HGB BLD-MCNC: 11.4 G/DL (ref 12–16)
IMM GRANULOCYTES # BLD: 0 THOU/UL
IMM GRANULOCYTES NFR BLD: 0 %
LDLC SERPL CALC-MCNC: 156 MG/DL
LYMPHOCYTES # BLD AUTO: 2.3 THOU/UL (ref 0.8–4.4)
LYMPHOCYTES NFR BLD AUTO: 34 % (ref 20–40)
MCH RBC QN AUTO: 30.9 PG (ref 27–34)
MCHC RBC AUTO-ENTMCNC: 33.4 G/DL (ref 32–36)
MCV RBC AUTO: 92 FL (ref 80–100)
MONOCYTES # BLD AUTO: 0.5 THOU/UL (ref 0–0.9)
MONOCYTES NFR BLD AUTO: 7 % (ref 2–10)
NEUTROPHILS # BLD AUTO: 3.6 THOU/UL (ref 2–7.7)
NEUTROPHILS NFR BLD AUTO: 55 % (ref 50–70)
PLATELET # BLD AUTO: 281 THOU/UL (ref 140–440)
PMV BLD AUTO: 10.3 FL (ref 8.5–12.5)
POTASSIUM BLD-SCNC: 3.8 MMOL/L (ref 3.5–5)
PROT SERPL-MCNC: 6.5 G/DL (ref 6–8)
RBC # BLD AUTO: 3.69 MILL/UL (ref 3.8–5.4)
SODIUM SERPL-SCNC: 144 MMOL/L (ref 136–145)
TRIGL SERPL-MCNC: 98 MG/DL
TSH SERPL DL<=0.005 MIU/L-ACNC: 1.15 UIU/ML (ref 0.3–5)
WBC: 6.6 THOU/UL (ref 4–11)

## 2020-12-17 ENCOUNTER — COMMUNICATION - HEALTHEAST (OUTPATIENT)
Dept: INTERNAL MEDICINE | Facility: CLINIC | Age: 65
End: 2020-12-17

## 2020-12-17 DIAGNOSIS — N28.9 RENAL INSUFFICIENCY: ICD-10-CM

## 2020-12-17 DIAGNOSIS — D64.9 ANEMIA, UNSPECIFIED TYPE: ICD-10-CM

## 2020-12-17 LAB
25(OH)D3 SERPL-MCNC: 30.8 NG/ML (ref 30–80)
25(OH)D3 SERPL-MCNC: 30.8 NG/ML (ref 30–80)

## 2020-12-21 LAB
BKR LAB AP ABNORMAL BLEEDING: NO
BKR LAB AP BIRTH CONTROL/HORMONES: NORMAL
BKR LAB AP CERVICAL APPEARANCE: NORMAL
BKR LAB AP GYN ADEQUACY: NORMAL
BKR LAB AP GYN INTERPRETATION: NORMAL
BKR LAB AP HPV REFLEX: NORMAL
BKR LAB AP LMP: 2012
BKR LAB AP PATIENT STATUS: NORMAL
BKR LAB AP PREVIOUS ABNORMAL: NORMAL
BKR LAB AP PREVIOUS NORMAL: 2015
HIGH RISK?: NO
PATH REPORT.COMMENTS IMP SPEC: NORMAL
RESULT FLAG (HE HISTORICAL CONVERSION): NORMAL

## 2021-01-11 ENCOUNTER — TRANSFERRED RECORDS (OUTPATIENT)
Dept: HEALTH INFORMATION MANAGEMENT | Facility: CLINIC | Age: 66
End: 2021-01-11
Payer: COMMERCIAL

## 2021-01-16 ENCOUNTER — COMMUNICATION - HEALTHEAST (OUTPATIENT)
Dept: INTERNAL MEDICINE | Facility: CLINIC | Age: 66
End: 2021-01-16

## 2021-01-18 ENCOUNTER — RECORDS - HEALTHEAST (OUTPATIENT)
Dept: ADMINISTRATIVE | Facility: OTHER | Age: 66
End: 2021-01-18

## 2021-02-16 ENCOUNTER — RECORDS - HEALTHEAST (OUTPATIENT)
Dept: ADMINISTRATIVE | Facility: OTHER | Age: 66
End: 2021-02-16

## 2021-04-20 ENCOUNTER — COMMUNICATION - HEALTHEAST (OUTPATIENT)
Dept: INTERNAL MEDICINE | Facility: CLINIC | Age: 66
End: 2021-04-20

## 2021-04-20 DIAGNOSIS — J30.1 SEASONAL ALLERGIC RHINITIS DUE TO POLLEN: ICD-10-CM

## 2021-04-20 RX ORDER — MONTELUKAST SODIUM 10 MG/1
TABLET ORAL
Qty: 90 TABLET | Refills: 2 | Status: SHIPPED | OUTPATIENT
Start: 2021-04-20

## 2021-04-29 ENCOUNTER — COMMUNICATION - HEALTHEAST (OUTPATIENT)
Dept: INTERNAL MEDICINE | Facility: CLINIC | Age: 66
End: 2021-04-29

## 2021-04-29 DIAGNOSIS — K21.9 GASTROESOPHAGEAL REFLUX DISEASE WITHOUT ESOPHAGITIS: ICD-10-CM

## 2021-05-30 ENCOUNTER — HEALTH MAINTENANCE LETTER (OUTPATIENT)
Age: 66
End: 2021-05-30

## 2021-05-31 VITALS — WEIGHT: 243 LBS | HEIGHT: 61 IN | BODY MASS INDEX: 45.88 KG/M2

## 2021-05-31 VITALS — BODY MASS INDEX: 46.88 KG/M2 | WEIGHT: 248.3 LBS | HEIGHT: 61 IN

## 2021-05-31 VITALS — WEIGHT: 248 LBS | HEIGHT: 61 IN | BODY MASS INDEX: 46.82 KG/M2

## 2021-06-01 VITALS — BODY MASS INDEX: 45.03 KG/M2 | WEIGHT: 246.19 LBS

## 2021-06-01 VITALS — WEIGHT: 251.2 LBS | BODY MASS INDEX: 45.95 KG/M2

## 2021-06-02 VITALS — WEIGHT: 245 LBS | BODY MASS INDEX: 44.81 KG/M2

## 2021-06-02 VITALS — WEIGHT: 240.5 LBS | BODY MASS INDEX: 43.99 KG/M2

## 2021-06-02 NOTE — TELEPHONE ENCOUNTER
Refill Approved    Rx renewed per Medication Renewal Policy. Medication was last renewed on 10/1/18  OV 11/27/18.    Melani Morrissey, [facility] Triage/Med Refill 10/20/2019     Requested Prescriptions   Pending Prescriptions Disp Refills     omeprazole (PRILOSEC) 20 MG capsule [Pharmacy Med Name: OMEPRAZOLE DR 20 MG CAPSULE] 180 capsule 0     Sig: TAKE 1 CAPSULE BY MOUTH 2 TIMES DAILY BEFORE MEALS.       GI Medications Refill Protocol Passed - 10/20/2019 11:10 AM        Passed - PCP or prescribing provider visit in last 12 or next 3 months.     Last office visit with prescriber/PCP: 7/31/2018 Padma Ortez MD OR same dept: Visit date not found OR same specialty: 7/31/2018 Padma Ortez MD  Last physical: 11/10/2017 Last MTM visit: Visit date not found   Next visit within 3 mo: Visit date not found  Next physical within 3 mo: Visit date not found  Prescriber OR PCP: Padma Ortez MD  Last diagnosis associated with med order: 1. Gastroesophageal reflux disease without esophagitis  - omeprazole (PRILOSEC) 20 MG capsule [Pharmacy Med Name: OMEPRAZOLE DR 20 MG CAPSULE]; TAKE 1 CAPSULE BY MOUTH 2 TIMES DAILY BEFORE MEALS.  Dispense: 180 capsule; Refill: 0    If protocol passes may refill for 12 months if within 3 months of last provider visit (or a total of 15 months).

## 2021-06-03 VITALS
HEIGHT: 60 IN | DIASTOLIC BLOOD PRESSURE: 74 MMHG | SYSTOLIC BLOOD PRESSURE: 116 MMHG | HEART RATE: 80 BPM | BODY MASS INDEX: 47.1 KG/M2 | WEIGHT: 239.9 LBS

## 2021-06-03 NOTE — PROGRESS NOTES
[facility] Follow Up Note    Assessment/Plan:  1. Annual physical exam  Patient refused shingles vaccine and hepatitis C screen today.  We will give her tetanus booster.  She will be due for Pap smear next year.  She will be due for colonoscopy in 2021.  She already scheduled 3D mammogram.  - Td, Preservative Free (green label)    2. Chronic pain of left knee  I suspect she has a fair amount of arthritis.  I recommended that she sees an orthopedist.  She is overweight but has been losing weight gradually.  Since I last saw her she lost 7 pounds.  - Ambulatory referral to Orthopedics    3. Hyperlipidemia LDL goal <130  Previous LDL was 160 because of its on the way up in my consider putting her on cholesterol medication.  Asked her to cut back on red meat.  - Lipid Cascade  - Comprehensive Metabolic Panel    4. Gastroesophageal reflux disease without esophagitis  Well-controlled on omeprazole.    5. Essential hypertension  Well-controlled on lisinopril-hydrochlorothiazide.  - Lipid Cascade  - Comprehensive Metabolic Panel    6. Vitamin D deficiency  He takes vitamin D supplements  - Vitamin D, Total (25-Hydroxy)    7. Need for vaccination  - Td, Preservative Free (green label)    8. Anemia, unspecified type  She is taking B12 supplements.  She is up-to-date on colonoscopy.  - HM1(CBC and Differential)  - Iron and Transferrin Iron Binding Capacity  - Vitamin B12  - HM1 (CBC with Diff)      Melia Lambert MD    Chief Complaint:  Chief Complaint   Patient presents with     Annual Exam       History of Present Illness:  Leeann is a 64 y.o. female  with history of increased BMI, high blood pressure, osteopenia, osteoarthritis in her knees, acid reflux, colon polyps, decreased hearing and vitamin D deficiency.  She is currently here for physical.    Patient reports that in 2018 she injured herself at work when she pulled her hamstring.  She has completed physical therapy and pool therapy but continues to  have some balance problems.  Is uncomfortable for her to see does stand for too long because knee starts to bother her.  She has been experiencing more pain in her left knee.  In the past she saw [facility] orthopedics in 2018.  She has been doing some exercises at home because her sister is a physical therapist.  She has been utilizing ibuprofen 200-400 mg a day.  She is on PPI and denies any stomach discomfort.  Discussed that I would recommend that she sees an orthopedist to evaluate for knee arthritis.  Patient does have morbid obesity and I would not be surprised if she has fairly advanced arthritis.    She also has moderate hyperlipidemia.  Previous LDL was 160.  She needs one egg every other day but does eat red meat.  Discussed to cut back on that.  Will check LDL today and if its going in the upward direction consider putting her on a cholesterol medication.    B12 level was on the low side last year.  She currently takes supplements and will repeat levels again.    Review of Systems:  A comprehensive review of systems was performed and was otherwise negative.  She sleeps well and snores only occasionally.  She denies any depression or anxiety.  No shortness of breath or cough.  No chest pains or palpitations.  She has gotten hearing aids.  No abdominal pain constipation or diarrhea.  Acid reflux is controlled on PPI.  No urinary problems.  No vaginal bleeding.  She denies any problems with her eyes and does see ophthalmologist yearly.  She does wear hearing aids.  Balance has been an issue.  No recent falls.    PFSH:  Social History: Reviewed  Social History     Tobacco Use   Smoking Status Never Smoker   Smokeless Tobacco Never Used     Social History     Patient does not qualify to have social determinant information on file (likely too young).   Social History Narrative    She is . She has 2 children, a boy and a girl. 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  She does not smoke cigarettes and has about 2 alcoholic beverages a week and tries to exercise.       Past History: Reviewed  Current Outpatient Medications   Medication Sig Dispense Refill     cholecalciferol, vitamin D3, (VITAMIN D3) 1,000 unit capsule Take 4,000 Units by mouth daily.        cyanocobalamin, vitamin B-12, 2,500 mcg Chew Chew.       ibuprofen (ADVIL,MOTRIN) 200 MG tablet Take 200 mg by mouth every 6 (six) hours as needed for pain.       lisinopril-hydrochlorothiazide (PRINZIDE,ZESTORETIC) 20-25 mg per tablet Take 2 tablets by mouth daily. 180 tablet 0     MULTIVITAMIN ORAL Take 1 tablet by mouth daily.       omeprazole (PRILOSEC) 20 MG capsule TAKE 1 CAPSULE BY MOUTH 2 TIMES DAILY BEFORE MEALS. 180 capsule 0     No current facility-administered medications for this visit.        Family History: Viewed    Physical Exam:    Vitals:    11/11/19 0944   BP: 116/74   Patient Site: Left Arm   Patient Position: Sitting   Cuff Size: Adult Large   Pulse: 80   Weight: (!) 239 lb 14.4 oz (108.8 kg)   Height: 5' 0.25\" (1.53 m)     Wt Readings from Last 3 Encounters:   11/11/19 (!) 239 lb 14.4 oz (108.8 kg)   12/23/18 (!) 240 lb 8 oz (109.1 kg)   11/27/18 (!) 245 lb (111.1 kg)     Body mass index is 46.46 kg/m .    Constitutional:  Reveals a very pleasant overweight female.  Vitals:  Per nursing notes.  HEENT:No cervical LAD, no thyromegaly,  conjunctiva is pink, no scleral icterus, TMs are visualized and normal bl, oropharynx is clear, no exudates,   Cardiac:  Regular rate and rhythm,no murmurs, rubs, or gallops.  Legs without edema. Palpation of the radial pulse regular.  Lungs: Clear to auscultation bl.  Respiratory effort normal.  Abdomen:positive BS, soft, nontender, nondistended.  No hepato-splenomagaly  Skin:   Without rash, bruise, or palpable lesions.  Lots of freckles but no suspicious lesions identified.  She has mild varicosities in her legs  Rheumatologic: Normal joints and nails of the " hands.  She does have flat arches in her feet.  No synovitis in her hands, no effusion in her knees.  Neurologic:  Cranial nerves II-XII intact.     Psychiatric: affect appropriate, memory intact.   Breast exam: No axillary lymphadenopathy, breast masses or skin changes appreciated.    Data Review:    Analysis and Summary of Old Records (2): yes      Records Requested (1): no      Other History Summarized (from other people in the room) (2): no    Radiology Tests Summarized (XRAY/CT/MRI/DXA) (1): no    Labs Reviewed (1): yes    Medicine Tests Reviewed (EKG/ECHO/COLONOSCOPY/EGD) (1): colo    Independent Review of EKG or X-RAY (2): no

## 2021-06-03 NOTE — TELEPHONE ENCOUNTER
Pt states left hearing aide broke apart    Please call pt - wondering how long it will be :  [phone]  BASHIROk

## 2021-06-03 NOTE — PROGRESS NOTES
Hearing Aid Repair    Leeann dropped off both of her hearing aids for repair at the [facility] on 11/4/19. The right hearing aid is found to be in good working condition. The  is broken on the left hearing aid. Leeann is contacted and told that the  can be replaced in the clinic or it can be sent in for an out-of-warranty repair. Leeann decides she would like the hearing aid to be repaired in the clinic. The  is replaced on the left hearing aid. A listening check reveals the hearing aid is in good working condition after the  is replaced. Leeann understands she will be billed for the repair.    Armani Simental., [facility]  [place] Licensed Audiologist #[id number]

## 2021-06-03 NOTE — TELEPHONE ENCOUNTER
RN cannot approve Refill Request    RN can NOT refill this medication PCP to review - due for labs this month. Last office visit: 9/29/2016 Melia Lambert MD Last Physical: 11/27/2018 Last MTM visit: Visit date not found Last visit same specialty: 7/31/2018 Padma Ortez MD.  Next visit within 3 mo: Visit date not found  Next physical within 3 mo: Visit date not found      Radha Lyles, [facility] Triage/Med Refill 11/6/2019    Requested Prescriptions   Pending Prescriptions Disp Refills     lisinopril-hydrochlorothiazide (PRINZIDE,ZESTORETIC) 20-25 mg per tablet 180 tablet 0     Sig: Take 2 tablets by mouth daily.       Diuretics/Combination Diuretics Refill Protocol  Passed - 11/6/2019 10:38 PM        Passed - Visit with PCP or prescribing provider visit in past 12 months     Last office visit with prescriber/PCP: 9/29/2016 Melia Lambert MD OR same dept: Visit date not found OR same specialty: 7/31/2018 Padma Ortez MD  Last physical: 11/27/2018 Last MTM visit: Visit date not found   Next visit within 3 mo: Visit date not found  Next physical within 3 mo: Visit date not found  Prescriber OR PCP: Melia Lambert MD  Last diagnosis associated with med order: 1. Hypertension  - lisinopril-hydrochlorothiazide (PRINZIDE,ZESTORETIC) 20-25 mg per tablet; Take 2 tablets by mouth daily.  Dispense: 180 tablet; Refill: 0    If protocol passes may refill for 12 months if within 3 months of last provider visit (or a total of 15 months).             Passed - Serum Potassium in past 12 months      Lab Results   Component Value Date    Potassium 3.7 11/29/2018             Passed - Serum Sodium in past 12 months      Lab Results   Component Value Date    Sodium 141 11/29/2018             Passed - Blood pressure on file in past 12 months     BP Readings from Last 1 Encounters:   12/23/18 124/64             Passed - Serum Creatinine in past 12 months      Creatinine   Date Value Ref Range Status    11/29/2018 1.06 0.60 - 1.10 mg/dL Final

## 2021-06-04 VITALS
WEIGHT: 231 LBS | HEART RATE: 92 BPM | DIASTOLIC BLOOD PRESSURE: 60 MMHG | OXYGEN SATURATION: 98 % | HEIGHT: 60 IN | TEMPERATURE: 98.3 F | SYSTOLIC BLOOD PRESSURE: 98 MMHG | BODY MASS INDEX: 45.35 KG/M2

## 2021-06-04 VITALS
HEART RATE: 88 BPM | DIASTOLIC BLOOD PRESSURE: 71 MMHG | RESPIRATION RATE: 22 BRPM | WEIGHT: 240 LBS | OXYGEN SATURATION: 97 % | SYSTOLIC BLOOD PRESSURE: 104 MMHG | TEMPERATURE: 98.2 F | BODY MASS INDEX: 46.48 KG/M2

## 2021-06-05 VITALS
WEIGHT: 227 LBS | BODY MASS INDEX: 44.57 KG/M2 | OXYGEN SATURATION: 99 % | HEART RATE: 75 BPM | SYSTOLIC BLOOD PRESSURE: 128 MMHG | DIASTOLIC BLOOD PRESSURE: 76 MMHG | HEIGHT: 60 IN

## 2021-06-05 NOTE — TELEPHONE ENCOUNTER
Pt called in states she has cough the cough started last November.  The cough is regular.  Has shortness of breath with walking  No fever.  Has chest congestion.  No cough up blood.  Feels tired from cough.  Pt is taking mucin ex.  Pt has HTN.  No history of lung disease.  No history of blood clot.  Has runny nose,   Has wheezing with sleeping.  Feels little chest pressure from cough.  Has clear sputum sometime.  The disposition is it be seen with in the next 3 days.  Pt states she will go to [facility] tomorrow.  Care advice given per protocol.  Patient agrees with care advice given.   Agreed to call back if he has additional symptoms or questions.    Rosendo Duval RN, [facility] Triage/Med Refill 1/14/2020 4:54 PM      Reason for Disposition    Cough has been present for > 3 weeks    Protocols used: COUGH-A-OH

## 2021-06-05 NOTE — TELEPHONE ENCOUNTER
Refill Approved    Rx renewed per Medication Renewal Policy. Medication was last renewed on 11/7/19.    Yanely Pan, [facility] Connection Triage/Med Refill 2/5/2020     Requested Prescriptions   Pending Prescriptions Disp Refills     lisinopril-hydrochlorothiazide (PRINZIDE,ZESTORETIC) 20-25 mg per tablet [Pharmacy Med Name: LISINOPRIL-HCTZ 20-25 MG TAB] 180 tablet 0     Sig: TAKE TWO TABLETS BY MOUTH DAILY       Diuretics/Combination Diuretics Refill Protocol  Passed - 2/5/2020  7:50 AM        Passed - Visit with PCP or prescribing provider visit in past 12 months     Last office visit with prescriber/PCP: 9/29/2016 Melia Lambert MD OR same dept: Visit date not found OR same specialty: 7/31/2018 Padma Ortez MD  Last physical: 11/11/2019 Last MTM visit: Visit date not found   Next visit within 3 mo: Visit date not found  Next physical within 3 mo: Visit date not found  Prescriber OR PCP: Melia Lambert MD  Last diagnosis associated with med order: 1. Hypertension  - lisinopril-hydrochlorothiazide (PRINZIDE,ZESTORETIC) 20-25 mg per tablet [Pharmacy Med Name: LISINOPRIL-HCTZ 20-25 MG TAB]; TAKE TWO TABLETS BY MOUTH DAILY  Dispense: 180 tablet; Refill: 0    If protocol passes may refill for 12 months if within 3 months of last provider visit (or a total of 15 months).             Passed - Serum Potassium in past 12 months      Lab Results   Component Value Date    Potassium 4.0 11/11/2019             Passed - Serum Sodium in past 12 months      Lab Results   Component Value Date    Sodium 141 11/11/2019             Passed - Blood pressure on file in past 12 months     BP Readings from Last 1 Encounters:   01/14/20 104/71             Passed - Serum Creatinine in past 12 months      Creatinine   Date Value Ref Range Status   11/11/2019 1.03 0.60 - 1.10 mg/dL Final

## 2021-06-05 NOTE — TELEPHONE ENCOUNTER
Please mail pt disability parking certificate (in my out box).  Please also make copy and scan to chart.

## 2021-06-06 NOTE — TELEPHONE ENCOUNTER
[facility] #2     Melani Morrissey RN    [facility] Triage/med refill  3/7/2020  8:25 AM

## 2021-06-06 NOTE — TELEPHONE ENCOUNTER
Who is requesting the letter?  Patient  Why is the letter needed? Patient stated that she missed work due to work, fever, chills, vomiting and diarrhea. Patient stated that she missed work since 3/2/2020. Patient stated she still doesn't feel well and would like an letter to be excused from  Work from dates 3/2/2020 to 3/6/2020 and returning on 3/9/2020. Patient stated that she has not been evaluated by a provider. Patient declined nurse triage. Patient declined appointment.  How would you like this letter returned?   Okay to leave a detailed message? Yes  [phone]

## 2021-06-06 NOTE — TELEPHONE ENCOUNTER
[facility] #3 Please let patient know, kidney function is worse since dehydration, potassium is on the lower side and I would like her to take potassium supplement for a few days.  Prescription sent to her pharmacy.       As discussed, while she is having diarrhea she should stop her lisinopril-hydrochlorothiazide blood pressure medication and utilize amlodipine if blood pressure 150 or above.    Melani Morrissey RN    [facility] Triage/med refill  3/8/2020  11:02 AM

## 2021-06-06 NOTE — PATIENT INSTRUCTIONS - HE
1. Due to low b/p and diarrhea, stop b/p medication (Lisinopril-HCTZ). O'K if b/p 140-150. If b/p starts going above that, start Amlodipine b/p medication (it does not affect your electrolytes).    O'K to stop Amlodipine and go back on Lisinopril-HCTZ once fully recovered.     2. Will check potassium level tday    3.Avoid dairy, continue on BRAT diet.  If diarrhea does not improve, have stool tests done.

## 2021-06-06 NOTE — TELEPHONE ENCOUNTER
Please let patient know, kidney function is worse since dehydration, potassium is on the lower side and I would like her to take potassium supplement for a few days.  Prescription sent to her pharmacy.      As discussed, while she is having diarrhea she should stop her lisinopril-hydrochlorothiazide blood pressure medication and utilize amlodipine if blood pressure 150 or above.

## 2021-06-06 NOTE — PROGRESS NOTES
[facility] Follow Up Note    Assessment/Plan:    1. Diarrhea, unspecified type  Most likely a viral gastroenteritis since other family members were affected.  Currently still moderate in severity but per patient is improving.  Discussed to continue brat diet.  If consistency of diarrhea failed to improve and he continues to be watery and frequent in the next week she will have stool cultures done but for now okay to wait.  Will check electrolyte levels today.  - C. difficile Toxigenic by PCR; Future  - Culture, Stool; Future  - Basic Metabolic Panel    2. Nausea and vomiting, intractability of vomiting not specified, unspecified vomiting type  This currently has improved and she has not vomited again  - Basic Metabolic Panel    3. Essential hypertension  She is on lisinopril-hydrochlorothiazide and blood pressure today is low.  I also would like her to stay off diuretic while having diarrhea.  She will stop it now and if blood pressure at home 150 or above she will take amlodipine.  - amLODIPine (NORVASC) 5 MG tablet; Take 1 tablet (5 mg total) by mouth daily.  Dispense: 30 tablet; Refill: 1  - Basic Metabolic Panel      Melia Lambert MD    Chief Complaint:  Chief Complaint   Patient presents with     Diarrhea     Started on Monday, I still have it but a little better      URI     Denied cough, had chills and fever       History of Present Illness:  Leeann is a 64 y.o. female  with history of increased BMI, high blood pressure, osteopenia, osteoarthritis in her knees, acid reflux, colon polyps, decreased hearing and vitamin D deficiency.  She is currently here for acute visit due to gastroenteritis.    Symptoms started 4 days ago.  She has had headache and subsequently developed nausea and vomiting for 1 day and chills and profuse diarrhea.  Her son and daughter-in-law went through the same illness but have recovered already.  Currently diarrhea is watery, about 8 bowel movements a day.  She denies  any abdominal pain or blood in the stool.  She has lost 8 pounds since her appetite have been low.  Currently there is more formation in the stool overall she feels that there is an improvement.  She has been falling brat diet.      she was hoping to go to work today but still is feeling fatigued and tired.  She is on lisinopril-hydrochlorothiazide for blood pressure control and has been on it.  Blood pressure today is 98 systolically.  She denies dizziness or syncope.    Review of Systems:  A comprehensive review of systems was performed and was otherwise negative    PFSH:  Social History: Reviewed  Social History     Tobacco Use   Smoking Status Never Smoker   Smokeless Tobacco Never Used     Social History     Social History Narrative    She is . She has 2 children, a boy and a girl. 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  She does not smoke cigarettes and has about 2 alcoholic beverages a week and tries to exercise.       Past History: Reviewed  Current Outpatient Medications   Medication Sig Dispense Refill     atorvastatin (LIPITOR) 10 MG tablet Take 1 tablet (10 mg total) by mouth daily. 30 tablet 11     cholecalciferol, vitamin D3, (VITAMIN D3) 1,000 unit capsule Take 4,000 Units by mouth daily.        cyanocobalamin, vitamin B-12, 2,500 mcg Chew Chew.       ibuprofen (ADVIL,MOTRIN) 200 MG tablet Take 200 mg by mouth every 6 (six) hours as needed for pain.       lisinopril-hydrochlorothiazide (PRINZIDE,ZESTORETIC) 20-25 mg per tablet TAKE TWO TABLETS BY MOUTH DAILY 180 tablet 2     MULTIVITAMIN ORAL Take 1 tablet by mouth daily.       omeprazole (PRILOSEC) 20 MG capsule TAKE 1 CAPSULE BY MOUTH 2 TIMES DAILY BEFORE MEALS. 180 capsule 0     albuterol (PROAIR HFA;PROVENTIL HFA;VENTOLIN HFA) 90 mcg/actuation inhaler Inhale 2 puffs every 6 (six) hours as needed for wheezing. 1 each 0     amLODIPine (NORVASC) 5 MG tablet Take 1 tablet (5 mg total) by  "mouth daily. 30 tablet 1     benzonatate (TESSALON PERLES) 100 MG capsule Take 1 capsule (100 mg total) by mouth 3 (three) times a day as needed for cough. 30 capsule 0     No current facility-administered medications for this visit.        Family History: Reviewed    Physical Exam:    Vitals:    03/05/20 1525   BP: 98/60   Patient Site: Left Arm   Patient Position: Sitting   Cuff Size: Adult Large   Pulse: 92   Temp: 98.3  F (36.8  C)   TempSrc: Tympanic   SpO2: 98%   Weight: (!) 231 lb (104.8 kg)   Height: 5' 0.25\" (1.53 m)     Wt Readings from Last 3 Encounters:   03/05/20 (!) 231 lb (104.8 kg)   01/14/20 (!) 240 lb (108.9 kg)   11/11/19 (!) 239 lb 14.4 oz (108.8 kg)     Body mass index is 44.74 kg/m .    Constitutional:  Reveals a pleasant female.  Vitals:  Per nursing notes.  HEENT:No cervical LAD, no thyromegaly,  conjunctiva is pink, no scleral icterus, TMs are visualized and normal bl, oropharynx is clear, no exudates,   Cardiac:  Regular rate and rhythm,no murmurs, rubs, or gallops.Legs without edema. Palpation of the radial pulse regular.  Lungs: Clear to auscultation bl.  Respiratory effort normal.  Abdomen:positive BS, soft, nontender, nondistended.  No hepato-splenomagaly  Skin:   Without rash, bruise, or palpable lesions.  Rheumatologic: Normal joints and nails of the hands.  Neurologic:  Cranial nerves II-XII intact.     Psychiatric: affect appropriate, memory intact.     Data Review:    Analysis and Summary of Old Records (2): yes      Records Requested (1): no      Other History Summarized (from other people in the room) (2): no    Radiology Tests Summarized (XRAY/CT/MRI/DXA) (1): no    Labs Reviewed (1): yes    Medicine Tests Reviewed (EKG/ECHO/COLONOSCOPY/EGD) (1): no    Independent Review of EKG or X-RAY (2): no      "

## 2021-06-06 NOTE — TELEPHONE ENCOUNTER
Pt is calling in about needing a note to return to work after having missed 4 days of work so far. Pt started having a headache on Monday night, followed by vomiting, diarrhea, fever and chills. Pt has not been able to return to work, and now today, she still has diarrhea. Pt has had more stools yesterday, and now today has only had 3-4. Pt denies headache, fever, chills, vomiting.   Home care measures discussed, use of Pedialyte, gatorade, and per protocol pt should be seen in the clinic within 3 days, as pt also needs a note to return to work. Pt was transferred to scheduling for an appointment. Pt made an appointment for tomorrow in the clinic.    Jose Johnson RN [facility] Triage/Medication Refill     Reason for Disposition    MILD diarrhea (e.g., 1-3 or more stools than normal in past 24 hours) diarrhea without known cause and present > 7 days    Protocols used: DIARRHEA-A-OH

## 2021-06-09 NOTE — PROGRESS NOTES
"Leeann Mckeno is a 64 y.o. female who is being evaluated via a billable telephone visit.      The patient has been notified of following:     \"This telephone visit will be conducted via a call between you and your physician/provider. We have found that certain health care needs can be provided without the need for a physical exam.  This service lets us provide the care you need with a short phone conversation.  If a prescription is necessary we can send it directly to your pharmacy.  If lab work is needed we can place an order for that and you can then stop by our lab to have the test done at a later time.    Telephone visits are billed at different rates depending on your insurance coverage. During this emergency period, for some insurers they may be billed the same as an in-person visit.  Please reach out to your insurance provider with any questions.    If during the course of the call the physician/provider feels a telephone visit is not appropriate, you will not be charged for this service.\"    Patient has given verbal consent to a Telephone visit? Yes    What phone number would you like to be contacted at? [phone]     Patient would like to receive their AVS by AVS Preference: Mail a copy.    Additional provider notes:    [facility] Follow Up Note    Assessment/Plan:    1. Hypertension  Well-controlled on lisinopril hydrochlorothiazide.  In the past I gave her prescription for amlodipine to use while she was in the midst of gastroenteritis to control her blood pressure instead of lisinopril.  Currently she is off of it and blood pressure is good on her chronic medication.    2. Seasonal allergic rhinitis due to pollen  Zyrtec has always been effective in the past but caused vertigo.  Discussed that she can try Claritin and if experiencing side effects then she can try Singulair.  She should also use Flonase as needed.    - methylPREDNISolone (MEDROL DOSEPACK) 4 mg tablet; Take 1 tablet (4 mg " total) by mouth daily for 6 days. Follow package directions  Dispense: 21 tablet; Refill: 0  - fluticasone propionate (FLONASE ALLERGY RELIEF) 50 mcg/actuation nasal spray; 1 spray into each nostril daily.  Dispense: 16 g; Refill: 2  - montelukast (SINGULAIR) 10 mg tablet; Take 1 tablet (10 mg total) by mouth at bedtime.  Dispense: 30 tablet; Refill: 5    3. Acute recurrent frontal sinusitis  We will treat him with a Medrol Dosepak and doxycycline.  - methylPREDNISolone (MEDROL DOSEPACK) 4 mg tablet; Take 1 tablet (4 mg total) by mouth daily for 6 days. Follow package directions  Dispense: 21 tablet; Refill: 0  - doxycycline (MONODOX) 100 MG capsule; Take 1 capsule (100 mg total) by mouth 2 (two) times a day for 10 days. Avoid direct sun due to increase sun sensitivity on this medication.  Dispense: 20 capsule; Refill: 0    Patient is going to be due for Pap smear and mammogram in November and will schedule physical at that time.    Melia Lambert MD    Chief Complaint:  Chief Complaint   Patient presents with     Allergies       History of Present Illness:  Leeann is a 64 y.o. female  with history of increased BMI, high blood pressure, osteopenia, osteoarthritis in her knees, acid reflux, colon polyps, decreased hearing and vitamin D deficiency.  She is currently here for a telephone visit with complaints of worsening allergies.    Patient does suffer from seasonal allergies every year.  In April when symptoms started she began taking Zyrtec which has been very effective in controlling her symptoms but developed vertigo while on it.  Once she stopped that medication vertigo has resolved.  Most recently 4 months she continued to have frontal headache, pain in her ears and teeth, some sneezing and postnasal drainage.  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  Currently she denies any fevers or chills but reports that she always feels  "\"cold\".    She reports that she measures her blood pressure daily and it is usually less than 130/80.  She is on lisinopril hydrochlorothiazide.  In the past I have given her prescription for amlodipine because she had profuse diarrhea and we wanted something for blood pressure control that did not interfere with his electrolytes.  Currently she is not taking it.    Review of Systems:  A comprehensive review of systems was performed and was otherwise negative    PFSH:  Social History: Reviewed  Social History     Tobacco Use   Smoking Status Never Smoker   Smokeless Tobacco Never Used     Social History     Social History Narrative    She is . She has 2 children, a boy and a girl. 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  She does not smoke cigarettes and has about 2 alcoholic beverages a week and tries to exercise.       Past History: Reviewed  Current Outpatient Medications   Medication Sig Dispense Refill     albuterol (PROAIR HFA;PROVENTIL HFA;VENTOLIN HFA) 90 mcg/actuation inhaler Inhale 2 puffs every 6 (six) hours as needed for wheezing. 1 each 0     atorvastatin (LIPITOR) 10 MG tablet Take 1 tablet (10 mg total) by mouth daily. 30 tablet 11     benzonatate (TESSALON PERLES) 100 MG capsule Take 1 capsule (100 mg total) by mouth 3 (three) times a day as needed for cough. 30 capsule 0     cholecalciferol, vitamin D3, (VITAMIN D3) 1,000 unit capsule Take 4,000 Units by mouth daily.        cyanocobalamin, vitamin B-12, 2,500 mcg Chew Chew.       ibuprofen (ADVIL,MOTRIN) 200 MG tablet Take 200 mg by mouth every 6 (six) hours as needed for pain.       lisinopril-hydrochlorothiazide (PRINZIDE,ZESTORETIC) 20-25 mg per tablet TAKE TWO TABLETS BY MOUTH DAILY 180 tablet 2     MULTIVITAMIN ORAL Take 1 tablet by mouth daily.       omeprazole (PRILOSEC) 20 MG capsule TAKE 1 CAPSULE BY MOUTH 2 TIMES DAILY BEFORE MEALS. 180 capsule 3     doxycycline (MONODOX) 100 MG " capsule Take 1 capsule (100 mg total) by mouth 2 (two) times a day for 10 days. Avoid direct sun due to increase sun sensitivity on this medication. 20 capsule 0     fluticasone propionate (FLONASE ALLERGY RELIEF) 50 mcg/actuation nasal spray 1 spray into each nostril daily. 16 g 2     methylPREDNISolone (MEDROL DOSEPACK) 4 mg tablet Take 1 tablet (4 mg total) by mouth daily for 6 days. Follow package directions 21 tablet 0     montelukast (SINGULAIR) 10 mg tablet Take 1 tablet (10 mg total) by mouth at bedtime. 30 tablet 5     No current facility-administered medications for this visit.        Family History: Reviewed    Physical Exam (limited by telephone visit):    There were no vitals filed for this visit.  Wt Readings from Last 3 Encounters:   03/05/20 (!) 231 lb (104.8 kg)   01/14/20 (!) 240 lb (108.9 kg)   11/11/19 (!) 239 lb 14.4 oz (108.8 kg)     There is no height or weight on file to calculate BMI.    Pleasant female, no acute distress, affect is appropriate concentration is good.  No shortness of breath, cough or respiratory symptoms noted.    Data Review:    Analysis and Summary of Old Records (2): yes      Records Requested (1): yes      Other History Summarized (from other people in the room) (2): no    Radiology Tests Summarized (XRAY/CT/MRI/DXA) (1): no    Labs Reviewed (1): yes    Medicine Tests Reviewed (EKG/ECHO/COLONOSCOPY/EGD) (1): no    Independent Review of EKG or X-RAY (2): no    Telephone call start time: 9:48 AM  Telephone call stop time: 10 AM  Phone call duration:  12 minutes    Joy C Steinert, CMA

## 2021-06-12 NOTE — PROGRESS NOTES
"[facility] Follow Up Note    Leeann Mckeon   61 y.o. female    Date of Visit: 7/25/2017    Chief Complaint   Patient presents with     Abdominal Pain     Subjective  Leeann comes in today to discuss some abdominal concerns.  Around July 1, she was added family barbecue and her and her son both got very ill with diarrhea and abdominal pain and increased heartburn.  She recovered from that but since has had increasing problems with right upper quadrant pain whenever she eats.  She also has some epigastric discomfort and feels things bubbling up into her throat at times.    ROS A comprehensive review of systems was performed and was otherwise negative    Social History:   Social History     Social History Narrative    She is . She has 2 children, a boy and a girl. 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  She does not smoke cigarettes and has about 2 alcoholic beverages a week and tries to exercise.       Medications were reconciled.  Allergies, social and family history, and the problem list were all reviewed and updated.    Exam  General Appearance: Pleasant and alert  Vitals:    07/25/17 0856   BP: 110/70   Pulse: 68   Resp: 12   Weight: (!) 248 lb (112.5 kg)   Height: 5' 1\" (1.549 m)      Body mass index is 46.86 kg/(m^2).  Wt Readings from Last 3 Encounters:   07/25/17 (!) 248 lb (112.5 kg)   11/11/16 (!) 238 lb (108 kg)   09/29/16 (!) 235 lb 9 oz (106.9 kg)     HEENT: Sclera are clear.   Lungs: Normal respirations  Cardiac: Regular rate and rhythm  Abdomen: Soft and nondistended, mild tenderness in the right upper quadrant to palpation  Extremities: No edema  Skin: No rashes  Neuro: Moves all extremities and has facial symmetry  Gait: Ambulates with a normal gait    Assessment/Plan  1. RUQ pain  I suspect she has gallbladder disease.  Will check labs and an ultrasound.  She would be medically stable for cholecystectomy if necessary.  If " ultrasound and blood testing are negative, and increased in medications as noted below are not helpful, would then proceed with a CT scan.  - HM1(CBC and Differential)  - Basic Metabolic Panel  - Hepatic Profile  - HM1 (CBC with Diff)  - US Abdomen Limited; Future  - Lipase    2. Gastroesophageal reflux disease without esophagitis  We will increase her omeprazole to 20 mg twice daily and add sucralfate 4 times daily.  - omeprazole (PRILOSEC) 20 MG capsule; Take 1 capsule (20 mg total) by mouth 2 (two) times a day before meals.  Dispense: 90 capsule; Refill: 3          April D MD Pérez  7/25/2017    Much or all of the text in this note was generated through the use of Dragon Dictate voice-to-text software. Errors in spelling or words which seem out of context are unintentional. Sound alike errors, in particular, may have escaped editing.

## 2021-06-12 NOTE — ANESTHESIA POSTPROCEDURE EVALUATION
Patient: Leeann Mckeon  CHOLECYSTECTOMY, LAPAROSCOPIC  Anesthesia type: general    Patient location: PACU  Last vitals:   Vitals:    08/02/17 1119   BP:    Pulse: 79   Resp: 12   Temp:    SpO2: 100%     Post vital signs: stable  Level of consciousness: awake and responds to simple questions  Post-anesthesia pain: pain controlled  Post-anesthesia nausea and vomiting: no  Pulmonary: unassisted, return to baseline  Cardiovascular: stable and blood pressure at baseline  Hydration: adequate  Anesthetic events: no    QCDR Measures:  ASA# 11 - Sultana-op Cardiac Arrest: ASA11B - Patient did NOT experience unanticipated cardiac arrest  ASA# 12 - Sultana-op Mortality Rate: ASA12B - Patient did NOT die  ASA# 13 - PACU Re-Intubation Rate: ASA13B - Patient did NOT require a new airway mgmt  ASA# 10 - Composite Anes Safety: ASA10A - No serious adverse event  ASA# 38 - New Corneal Injury: ASA38A - No new exposure keratitis or corneal abrasion in PACU    Additional Notes:

## 2021-06-12 NOTE — ANESTHESIA PREPROCEDURE EVALUATION
Anesthesia Evaluation      Patient summary reviewed   No history of anesthetic complications     Airway   Mallampati: II  Neck ROM: full   Pulmonary - normal exam    breath sounds clear to auscultation  (-) asthma, shortness of breath, recent URI, sleep apnea                         Cardiovascular - normal exam  Exercise tolerance: > or = 4 METS  (+) hypertension, ,     (-) angina  ECG reviewed  Rhythm: regular  Rate: normal,         Neuro/Psych    (-) no seizures, no neuromuscular disease, no CVA    Endo/Other    (+) obesity,   (-) no diabetes     GI/Hepatic/Renal    (+) GERD well controlled and intermittent,             Dental    (+) caps                       Anesthesia Plan  Planned anesthetic: general endotracheal  Modified RSI with rocuronium  ASA 2   Induction: intravenous   Anesthetic plan and risks discussed with: patient  Anesthesia plan special considerations: rapid sequence induction,   Post-op plan: routine recovery

## 2021-06-12 NOTE — ANESTHESIA CARE TRANSFER NOTE
Last vitals:   Vitals:    08/02/17 1115   BP:    Pulse: 88   Resp: 20   Temp: 36.7  C (98  F)   SpO2: 100%     Patient's level of consciousness is drowsy  Spontaneous respirations: yes  Maintains airway independently: yes  Dentition unchanged: yes  Oropharynx: oropharynx clear of all foreign objects    QCDR Measures:  ASA# 20 - Surgical Safety Checklist: WHO surgical safety checklist completed prior to induction  PQRS# 430 - Adult PONV Prevention: 4558F - Pt received => 2 anti-emetic agents (different classes) preop & intraop  ASA# 8 - Peds PONV Prevention: NA - Not pediatric patient, not GA or 2 or more risk factors NOT present  PQRS# 424 - Sultana-op Temp Management: 4559F - At least one body temp DOCUMENTED => 35.5C or 95.9F within required timeframe  PQRS# 426 - PACU Transfer Protocol: - Transfer of care checklist used  ASA# 14 - Acute Post-op Pain: ASA14B - Patient did NOT experience pain >= 7 out of 10

## 2021-06-13 NOTE — TELEPHONE ENCOUNTER
Refill Approved    Rx renewed per Medication Renewal Policy. Medication was last renewed on 2/25/20.  Last OV was virtual on 7/15/20    Diann Garnett, [facility] Triage/Med Refill 11/15/2020     Requested Prescriptions   Pending Prescriptions Disp Refills     lisinopriL-hydrochlorothiazide (PRINZIDE,ZESTORETIC) 20-25 mg per tablet [Pharmacy Med Name: LISINOPRIL-HCTZ 20-25 MG TA 20-25 Tablet] 180 tablet 2     Sig: TAKE TWO TABLETS BY MOUTH DAILY       Diuretics/Combination Diuretics Refill Protocol  Passed - 11/11/2020  5:46 PM        Passed - Visit with PCP or prescribing provider visit in past 12 months     Last office visit with prescriber/PCP: 3/5/2020 Melia Lambert MD OR same dept: Visit date not found OR same specialty: 3/5/2020 Melia Lambert MD  Last physical: 11/11/2019 Last MTM visit: Visit date not found   Next visit within 3 mo: Visit date not found  Next physical within 3 mo: Visit date not found  Prescriber OR PCP: Melia Lambert MD  Last diagnosis associated with med order: 1. Hypertension  - lisinopriL-hydrochlorothiazide (PRINZIDE,ZESTORETIC) 20-25 mg per tablet [Pharmacy Med Name: LISINOPRIL-HCTZ 20-25 MG TA 20-25 Tablet]; TAKE TWO TABLETS BY MOUTH DAILY  Dispense: 180 tablet; Refill: 2    If protocol passes may refill for 12 months if within 3 months of last provider visit (or a total of 15 months).             Passed - Serum Potassium in past 12 months      Lab Results   Component Value Date    Potassium 3.2 (L) 03/05/2020             Passed - Serum Sodium in past 12 months      Lab Results   Component Value Date    Sodium 140 03/05/2020             Passed - Blood pressure on file in past 12 months     BP Readings from Last 1 Encounters:   03/05/20 98/60             Passed - Serum Creatinine in past 12 months      Creatinine   Date Value Ref Range Status   03/05/2020 1.42 (H) 0.60 - 1.10 mg/dL Final

## 2021-06-13 NOTE — TELEPHONE ENCOUNTER
Spoke with pt and relayed pcp message.  Pt understanding and agreeable.  Pt reports she takes about 600 mg of advil daily for her arthritis in her knees.

## 2021-06-13 NOTE — TELEPHONE ENCOUNTER
Please let pt know results:    Creatinine continues to be elevated for the last year but overall is stable in the last 9 months and has not gotten to be worse.  you also have mild new anemia.  You will be due for colonoscopy in February and   I put order in.  If acid reflux is not better since increasing omeprazole let me know and then will do endoscopy as well.  Also not sure why a kidney function has worsened this year although it has been stable.  You taking any ibuprofen, Aleve, naproxen?  If not I would recommend maybe you should see a nephrologist.  Nikki

## 2021-06-13 NOTE — PROGRESS NOTES
[facility] Follow Up Note    Assessment/Plan:  1. Annual physical exam  Discussed to get shingles vaccine although patient is hesitant because her sister had a side effect.  Pap smear was collected today, giving her age that would be her last Pap smear.  She will come back for fasting blood work.  Previous colonoscopy was done in 2016, there were no polyps but due to history of previous polyps 5-year follow-up was recommended.    2. BMI 40.0-44.9, adult (H)  She has been gradually losing weight for the last 2 years and for the loss was encouraged.  - Thyroid Stimulating Hormone (TSH); Future    3. Essential hypertension  Well-controlled on lisinopril hydrochlorothiazide, will continue the same dose.  - Comprehensive Metabolic Panel; Future  - Thyroid Stimulating Hormone (TSH); Future    4. Gastroesophageal reflux disease without esophagitis  She is on omeprazole twice a day.  Discussed to stop carbonated beverages and do a trial of once a day dosing.  - HM1(CBC and Differential); Future    5. Hyperlipidemia LDL goal <130  Is on Lipitor  - Lipid Cascade FASTING; Future  - Comprehensive Metabolic Panel; Future  - Thyroid Stimulating Hormone (TSH); Future    6. Vitamin D deficiency  She is taking vitamin D 4000 units a day.  - Vitamin D, Total (25-Hydroxy); Future    7. Nasal congestion  Continue Flonase and Singulair.    8. Vaginal itching  - Wet Prep, Vaginal    9. Cervical cancer screening  - Gynecologic Cytology (PAP Smear)    10.  Cervical polyp.  Patient reports that she has had for a long time.  It has not been bleeding.  However since we are not going to be doing any more Pap smears for her I recommended that she sees her gynecologist to have in the future.    Melia Lambert MD    Chief Complaint:  Chief Complaint   Patient presents with     Annual Exam       History of Present Illness:  Leeann is a 65 y.o. female with history of increased BMI, high blood pressure, osteopenia, osteoarthritis in  her knees, acid reflux, colon polyps, decreased hearing and vitamin D deficiency.  She is currently here for a physical.    Last had a virtual visit with her in July.  At that time she was having increased congestion and we added Singulair which has helped.  She denies any history of asthma.  This year was particularly bad for her allergies and she will continue on Singulair daily.    Last Pap was in 2015 and was normal.  She is due for repeat today.  She denies any bleeding or spotting.  Occasionally experiences mild itching.    She sees ophthalmologist annually and wears glasses.  She has hearing aids and plans to have her hearing checked again soon.    Patient retired in October.  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    She continues intentionally losing weight and since last year lost 4 pounds.  Her blood pressure is well.    She does have acid reflux.  Most recently it has been acting out and she has been taking omeprazole twice a day.  Discussed not to drink any carbonated beverages to help decrease the use of would back to once a day.    She takes vitamin D 4000 units a day.    Review of Systems:  A comprehensive review of systems was performed and was otherwise negative    PFSH:  Social History: Viewed  Social History     Tobacco Use   Smoking Status Never Smoker   Smokeless Tobacco Never Used     Social History     Social History Narrative    She is . She has 2 children, a boy and a girl. 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  She does not smoke cigarettes and has about 2 alcoholic beverages a week and tries to exercise.       Past History: Reviewed  Current Outpatient Medications   Medication Sig Dispense Refill     albuterol (PROAIR HFA;PROVENTIL HFA;VENTOLIN HFA) 90 mcg/actuation inhaler Inhale 2 puffs every 6 (six) hours  "as needed for wheezing. 1 each 0     atorvastatin (LIPITOR) 10 MG tablet Take 1 tablet (10 mg total) by mouth daily. 30 tablet 11     benzonatate (TESSALON PERLES) 100 MG capsule Take 1 capsule (100 mg total) by mouth 3 (three) times a day as needed for cough. 30 capsule 0     cholecalciferol, vitamin D3, (VITAMIN D3) 1,000 unit capsule Take 4,000 Units by mouth daily.        cyanocobalamin, vitamin B-12, 2,500 mcg Chew Chew.       fluticasone propionate (FLONASE ALLERGY RELIEF) 50 mcg/actuation nasal spray 1 spray into each nostril daily. 16 g 2     ibuprofen (ADVIL,MOTRIN) 200 MG tablet Take 200 mg by mouth every 6 (six) hours as needed for pain.       lisinopriL-hydrochlorothiazide (PRINZIDE,ZESTORETIC) 20-25 mg per tablet TAKE TWO TABLETS BY MOUTH DAILY 180 tablet 2     montelukast (SINGULAIR) 10 mg tablet Take 1 tablet (10 mg total) by mouth at bedtime. 30 tablet 5     MULTIVITAMIN ORAL Take 1 tablet by mouth daily.       omeprazole (PRILOSEC) 20 MG capsule TAKE 1 CAPSULE BY MOUTH 2 TIMES DAILY BEFORE MEALS. 180 capsule 3     No current facility-administered medications for this visit.        Family History: Reviewed    Physical Exam:    Vitals:    12/14/20 1348   BP: 128/76   Patient Site: Left Arm   Patient Position: Sitting   Cuff Size: Adult Regular   Pulse: 75   SpO2: 99%   Weight: (!) 227 lb (103 kg)   Height: 5' 0.25\" (1.53 m)     Wt Readings from Last 3 Encounters:   12/14/20 (!) 227 lb (103 kg)   03/05/20 (!) 231 lb (104.8 kg)   01/14/20 (!) 240 lb (108.9 kg)     Body mass index is 43.97 kg/m .    Constitutional:  Reveals a pleasant female.  Vitals:  Per nursing notes.  HEENT:No cervical LAD, no thyromegaly,  conjunctiva is pink, no scleral icterus, TMs are visualized and normal bl, oropharynx is clear, no exudates,   Cardiac:  Regular rate and rhythm,no murmurs, rubs, or gallops. Legs without edema. Palpation of the radial pulse regular.  Lungs: Clear to auscultation bl.  Respiratory effort " normal.  Abdomen:positive BS, soft, nontender, nondistended.  No hepato-splenomagaly  Skin:   Without rash, bruise, or palpable lesions.  Rheumatologic: Normal joints and nails of the hands.  Neurologic:  Cranial nerves II-XII intact.     Psychiatric: affect appropriate, memory intact.   Breast exam: No axillary lymphadenopathy, breast masses or skin changes appreciated.  Gyn: normal external genitalia, cervix is midline, small non bleeding cervical polyp noted. No masses on bimanual exam.    Data Review:    Analysis and Summary of Old Records (2): Chart reviewed    Records Requested (1): No    Other History Summarized (from other people in the room) (2): no    Radiology Tests Summarized (XRAY/CT/MRI/DXA) (1): no    Labs Reviewed (1): yes    Medicine Tests Reviewed (EKG/ECHO/COLONOSCOPY/EGD) (1): colo    Independent Review of EKG or X-RAY (2): no

## 2021-06-13 NOTE — PATIENT INSTRUCTIONS - HE
1. Consider shingles vaccine    2. Pap smear today, if normal it would be your last one.    3. Have fasting blood work done    4. Consider seeing Gyn to remove small cervical polyp since we will not be doing any more paps going forward.

## 2021-06-13 NOTE — TELEPHONE ENCOUNTER
Please let her know, I would like her to stop Ibuprofen since it can cause stomach ulcers/anemia and kidney insufficiency. I'm concerned that it is negatively affecting her due to anemia and worsened kidney function on labs.  Try Tylenol up to 1000 mg three times a day.     Repeat lasb in 1 months. (order placed)

## 2021-06-14 NOTE — PROGRESS NOTES
"Chief complaint: Here for a physical    History of present illness:   Leeann comes in today for general physical.  She is can work on trying to lose some weight before her daughter gets  next year.  She had her gallbladder out this past summer and is feeling well.    Social history:   Social History     Social History Narrative    She is . She has 2 children, a boy and a girl. 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  She does not smoke cigarettes and has about 2 alcoholic beverages a week and tries to exercise.       Family history:    Family History   Problem Relation Age of Onset     Osteosarcoma Brother       age 31     Diabetes Mother       age 81     Anemia Father       age 88     Hyperlipidemia Sister      Breast cancer Neg Hx        Review of systems:   Please see above,  The rest of the review of systems are negative for all systems.    Current allergies, medications, and problem list are all reviewed and updated in the chart.    Physical exam:  Vitals:    11/10/17 0804   BP: 114/68   Patient Site: Left Arm   Patient Position: Sitting   Cuff Size: Adult Large   Pulse: 68   Weight: (!) 248 lb 4.8 oz (112.6 kg)   Height: 5' 0.75\" (1.543 m)     Body mass index is 47.3 kg/(m^2).  General Appearance:  Alert, cooperative, no distress, appears stated age   Head:  Normocephalic, without obvious abnormality, atraumatic   Eyes:  PERRL, conjunctiva/corneas clear, EOM's intact   Ears:  Normal TM's and external ear canals, both ears   Nose: Nares normal, no drainage    Throat: Lips, mucosa, and tongue normal; teeth and gums normal   Neck: Supple, symmetrical, trachea midline, no adenopathy;  thyroid: not enlarged, symmetric, no tenderness/mass/nodules; no carotid bruit   Back:   Symmetric, no curvature, ROM normal   Lungs:   Clear to auscultation bilaterally, respirations unlabored   Breasts:  No breast masses, tenderness, asymmetry, or nipple " discharge.   Heart:  Regular rate and rhythm, S1 and S2 normal, no murmur, rub, or gallop   Abdomen:   Soft, non-tender, positive bowel sounds, no masses, no organomegaly   Extremities: Extremities normal, atraumatic, no cyanosis or edema   Skin: Skin color, texture, turgor normal, no rashes or lesions   Lymph nodes: Cervical, supraclavicular, and axillary nodes normal   Neurologic:  nonfocal with facial symmetry      Assessment and plan:  1. Health care maintenance  Mammogram was done today.  Colonoscopy was done last year.  Pap smear was done last year.  She is up-to-date with her immunizations.    2. Hypertension  Stable on medication.  - Basic Metabolic Panel  - HM2(CBC w/o Differential)  - Thyroid Stimulating Hormone (TSH)  - Urinalysis-UC if Indicated    3. Hyperlipidemia  - Hepatic Profile  - Lipid Cascade    4. Vitamin D deficiency  - Vitamin D, Total (25-Hydroxy)        April DAlexi Ortez MD  Internal and Geriatric Medicine  [facility]  11/10/2017    Much or all of the text in this note was generated through the use of Dragon Dictate voice-to-text software. Errors in spelling or words which seem out of context are unintentional. Sound alike errors, in particular, may have escaped editing.

## 2021-06-16 NOTE — TELEPHONE ENCOUNTER
RN cannot approve Refill Request    RN can NOT refill this medication med is not covered by policy/route to provider. Last office visit: 3/5/2020 Melia Lambert MD Last Physical: 12/14/2020 Last MTM visit: Visit date not found Last visit same specialty: 3/5/2020 Melia Lambert MD.  Next visit within 3 mo: Visit date not found  Next physical within 3 mo: Visit date not found      Aston Pruitt, [facility] Triage/Med Refill 4/20/2021    Requested Prescriptions   Pending Prescriptions Disp Refills     montelukast (SINGULAIR) 10 mg tablet [Pharmacy Med Name: MONTELUKAST SOD 10 MG TAB 10 Tablet] 30 tablet 5     Sig: TAKE ONE TABLET ( 10 MG TOTAL) BY MOUTH AT BEDTIME       There is no refill protocol information for this order

## 2021-06-17 NOTE — PATIENT INSTRUCTIONS - HE
Patient Instructions by Dawson Carrillo PA-C at 1/14/2020  5:30 PM     Author: Dawson Carrillo PA-C Service: -- Author Type: Physician Assistant    Filed: 1/14/2020  6:14 PM Encounter Date: 1/14/2020 Status: Addendum    : Dawson Carrillo PA-C (Physician Assistant)    Related Notes: Original Note by Dawson Carrillo PA-C (Physician Assistant) filed at 1/14/2020  6:13 PM       You were seen today for acute bronchitis.     Symptom management:  - Get plenty of rest  - Avoid smoking and second hand smoke  - May take tylenol or ibuprofen for fever/discomfort  - Drink plenty of non-caffeinated fluids  - Use nasal steroid spray for sinus congestion  - Albuterol inhaler may be used every 6 hours as needed for chest tightness      Reasons to be seen in the emergency room:  - Develop a fever of 100.4 or higher  - Cough changes, coughing up blood, or become short of breath  - Neck stiffness  - Chest pain  - Severe headache  - Unable to tolerate eating or drinking fluids    Otherwise, if no symptom improvement after 5 days, follow-up with your primary care provider.        Patient Education     Bronchitis, Antibiotic Treatment (Adult)    Bronchitis is an infection of the air passages (bronchial tubes) in your lungs. It often occurs when you have a cold. This illness is contagious during the first few days and is spread through the air by coughing and sneezing, or by direct contact (touching the sick person and then touching your own eyes, nose, or mouth).  Symptoms of bronchitis include cough with mucus (phlegm) and low-grade fever. Bronchitis usually lasts 7 to 14 days. Mild cases can be treated with simple home remedies. More severe infection is treated with an antibiotic.  Home care  Follow these guidelines when caring for yourself at home:    If your symptoms are severe, rest at home for the first 2 to 3 days. When you go back to your usual activities, don't let yourself get too tired.    Do not smoke. Also avoid being exposed  to secondhand smoke.    You may use over-the-counter medicines to control fever or pain, unless another medicine was prescribed. If you have chronic liver or kidney disease or have ever had a stomach ulcer or gastrointestinal bleeding, talk with your healthcare provider before using these medicines. Also talk to your provider if you are taking medicine to prevent blood clots. Aspirin should never be given to anyone younger than 18 years of age who is ill with a viral infection or fever. It may cause severe liver or brain damage.    Your appetite may be poor, so a light diet is fine. Avoid dehydration by drinking 6 to 8 glasses of fluids per day (such as water, soft drinks, sports drinks, juices, tea, or soup). Extra fluids will help loosen secretions in the nose and lungs.    Over-the-counter cough, cold, and sore-throat medicines will not shorten the length of the illness, but they may be helpful to reduce symptoms. (Note: Do not use decongestants if you have high blood pressure.)    Finish all antibiotic medicine. Do this even if you are feeling better after only a few days.  Follow-up care  Follow up with your healthcare provider, or as advised. If you had an X-ray or ECG (electrocardiogram), a specialist will review it. You will be notified of any new findings that may affect your care.  If you are age 65 or older, or if you have a chronic lung disease or condition that affects your immune system, or you smoke, ask your healthcare provider about getting a pneumococcal vaccine and a yearly flu shot (influenza vaccine).  When to seek medical advice  Call your healthcare provider right away if any of these occur:    Fever of 100.4 F (38 C) or higher, or as directed by your healthcare provider    Coughing up increased amounts of colored sputum    Weakness, drowsiness, headache, facial pain, ear pain, or a stiff neck  Call 911  Call 911 if any of these occur.    Coughing up blood    Worsening weakness, drowsiness,  headache, or stiff neck    Trouble breathing, wheezing, or pain with breathing  Date Last Reviewed: 9/13/2015 2000-2017 [employer]. [address] [place] [address], [place] [address]. All rights reserved. This information is not intended as a substitute for professional medical care. Always follow your healthcare professional's instructions.

## 2021-06-17 NOTE — TELEPHONE ENCOUNTER
Refill Approved    Rx renewed per Medication Renewal Policy. Medication was last renewed on 4/27/20.    Aston Pruitt, [facility] Triage/Med Refill 4/30/2021     Requested Prescriptions   Pending Prescriptions Disp Refills     omeprazole (PRILOSEC) 20 MG capsule [Pharmacy Med Name: OMEPRAZOLE 20 MG CPDR 20 Capsule] 180 capsule 3     Sig: TAKE 1 CAPSULE BY MOUTH 2 TIMES DAILY BEFORE MEALS.       GI Medications Refill Protocol Passed - 4/29/2021  8:09 AM        Passed - PCP or prescribing provider visit in last 12 or next 3 months.     Last office visit with prescriber/PCP: 3/5/2020 Melia Lambert MD OR same dept: Visit date not found OR same specialty: 3/5/2020 Melia Lambert MD  Last physical: 12/14/2020 Last MTM visit: Visit date not found   Next visit within 3 mo: Visit date not found  Next physical within 3 mo: Visit date not found  Prescriber OR PCP: Melia Lambert MD  Last diagnosis associated with med order: 1. Gastroesophageal reflux disease without esophagitis  - omeprazole (PRILOSEC) 20 MG capsule [Pharmacy Med Name: OMEPRAZOLE 20 MG CPDR 20 Capsule]; TAKE 1 CAPSULE BY MOUTH 2 TIMES DAILY BEFORE MEALS.  Dispense: 180 capsule; Refill: 3    If protocol passes may refill for 12 months if within 3 months of last provider visit (or a total of 15 months).

## 2021-06-19 NOTE — PROGRESS NOTES
Assessment/Plan:   Pain in joint involving ankle and foot, left medial  Progressive pain and swelling right medial ankle, no injury, no other foot or leg swelling, no redness or warmth. No history of gout. Doubt infection - no redness or warmth, no wound. No significant tick/lyme exposure. I suspect strain or inflammation due to abnormal gait caused by flare of left leg pain and right hamstring injury.   - naproxen (NAPROSYN) 500 MG tablet; Take 1 tablet (500 mg total) by mouth 2 (two) times a day with meals.  Dispense: 30 tablet; Refill: 0  - Ankle/Foot DME: Ankle Brace; Right    Continue ice, elevation, rest  Weight bearing as tolerated  Air cast splint for stability when walking and standing.  Recheck with primary provider in a week or two, sooner if worse or no better  Naproxen 500mg with food every 12 hours for a week or so for inflammation.  Do not take ibuprofen during this time of taking naproxen but you may take tylenol as needed in between.     Subjective:      Leeann Mckeon is a 62 y.o. female who presents with pain and swelling right ankle. No known injury and it started mild but has been getting worse. No redness or warmth. No history of gout. No history of DVT. No known ticks or much risk of tick exposure. She has been recovering from a right hamstring injury and has recently had a flare of left lateral leg/thigh tingling and pain which she has had before. She thinks she has been walking abnormally since the left leg pain started and that has triggered the trouble with her ankle.  No swelling of lower leg or foot other than the ankle. No rash. She has been using ice and ibuprofen 800mg daily, she has tried an ankle sleeve and ace wrap - without much benefit. No fever or systemic sxs or malaise.  Pain and swelling is localized medially.     Allergies   Allergen Reactions     Sulfa (Sulfonamide Antibiotics) Itching and Rash     Current Outpatient Prescriptions on File Prior to Visit   Medication Sig  Dispense Refill     cholecalciferol, vitamin D3, (VITAMIN D3) 1,000 unit capsule Take 4,000 Units by mouth daily.        ibuprofen (ADVIL,MOTRIN) 200 MG tablet Take 200 mg by mouth every 6 (six) hours as needed for pain.       lisinopril-hydrochlorothiazide (PRINZIDE,ZESTORETIC) 20-25 mg per tablet TAKE TWO TABLETS BY MOUTH EVERY DAY. 180 tablet 3     MULTIVITAMIN ORAL Take 1 tablet by mouth daily.       omeprazole (PRILOSEC) 20 MG capsule Take 1 capsule (20 mg total) by mouth 2 (two) times a day before meals. 90 capsule 3     cetirizine (ZYRTEC) 10 MG tablet Take 10 mg by mouth daily as needed for allergies.       sucralfate (CARAFATE) 1 gram tablet Take 1 tablet (1 g total) by mouth 4 (four) times a day. 120 tablet 1     No current facility-administered medications on file prior to visit.      Patient Active Problem List   Diagnosis     Obesity     Hypertension     Osteopenia     Arthritis     Esophageal Reflux     Adenomatous colon polyp     Hearing loss       Objective:     /62 (Patient Site: Right Arm, Patient Position: Sitting, Cuff Size: Adult Large)  Pulse 80  Temp 98.4  F (36.9  C) (Oral)   Resp 20  Wt (!) 251 lb 3.2 oz (113.9 kg)  SpO2 98%  BMI 45.95 kg/m2    Physical  General Appearance: Alert, cooperative, no distress  Head: Normocephalic, without obvious abnormality, atraumatic  Eyes: Conjunctivae are normal.  Extremities: No lower extremity edema or calf tenderness. Achilles tendon intact and nontender. No pain over the calcaneous or plantar fascia or insertion. No redness or warmth.  Swelling around the medial malleolus, tender. No deformity. Mild soreness over the anterolateral ligaments as well. No pain at the metatarsals. Normal pedal pulses and normal cap refill.   Skin: no rashes or lesions  Psychiatric: Patient has a normal mood and affect.

## 2021-06-19 NOTE — PROGRESS NOTES
[facility] Follow Up Note    Leeann Mckeon   62 y.o. female    Date of Visit: 7/31/2018    Chief Complaint   Patient presents with     Foot Pain     right foot     Subjective  Leeann comes in today due to some right heel pain that radiates up into her Achilles tendon.  This is been going on for the last few weeks and is worse when she first gets up in the morning and tries to ambulate.  She had a recently torn hamstring and it took a while to recover from that-otherwise she is doing okay.    ROS A comprehensive review of systems was performed and was otherwise negative.    Social History     Social History Narrative    She is . She has 2 children, a boy and a girl. 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  She does not smoke cigarettes and has about 2 alcoholic beverages a week and tries to exercise.       Medications were reconciled.  Allergies, social and family history, and the problem list were all reviewed and updated.      Exam  General Appearance: Pleasant and alert   Vitals:    07/31/18 1349   BP: 126/66   Patient Site: Left Arm   Patient Position: Sitting   Cuff Size: Adult Regular   Pulse: 82   SpO2: 98%   Weight: (!) 246 lb 3 oz (111.7 kg)      Body mass index is 45.03 kg/(m^2).  Wt Readings from Last 3 Encounters:   07/31/18 (!) 246 lb 3 oz (111.7 kg)   07/13/18 (!) 251 lb 3.2 oz (113.9 kg)   02/20/18 (!) 248 lb (112.5 kg)     HEENT: Sclera are clear.   Lungs: Normal respirations  Abdomen: Soft and nondistended  Extremities: No edema  Skin: No rashes  Neuro: Moves all extremities and has facial symmetry  Gait: Ambulates with antalgic gait favoring the right ankle.  Tenderness along the Achilles tendon    Assessment/Plan  1. Pain in joint involving ankle and foot, right  Have her see Dr. Fariha Arana at [facility] for further evaluation and treatment.  - Ambulatory referral to Orthopedic Surgery          Padma Ortez  MD  Internal and Geriatric Medicine  [facility]    Much or all of the text in this note was generated through the use of Dragon Dictate voice-to-text software. Errors in spelling or words which seem out of context are unintentional. Sound alike errors, in particular, may have escaped editing.

## 2021-06-20 NOTE — LETTER
Letter by Melia Lambert MD at      Author: Melia Lambert MD Service: -- Author Type: --    Filed:  Encounter Date: 3/5/2020 Status: (Other)         March 5, 2020     Patient: Leeann Mckeon   YOB: 1955   Date of Visit: 3/5/2020       To Whom It May Concern:     Leeann Mckeon was seen in the office today. Due to acute illness, please excuse from work March 2nd trough March 6th 2020.    If you have any questions or concerns, please don't hesitate to call.    Sincerely,        Electronically signed by Melia Lambert MD

## 2021-06-20 NOTE — LETTER
Letter by Yesenia Palomares AuD at      Author: Yesenia Palomares AuD Service: -- Author Type: --    Filed:  Encounter Date: 8/26/2020 Status: (Other)       [facility]- Audiology Benefit Letter    ELIZ MONSALVE  1955  [address] [place] [address]    Insurance Company: Payor: BLUE CROSS / Plan: BLUE PLUS [employer] EMPLOYEE / Product Type: HMO /      [place] Product: No    ID # :  [id number]    Group#:  [id number]    Estimate of Benefits  Consult Visit Benefits:  BP STATE [facility] 08/2020 [place]     HAE, HAF, HAC Benefits:   HEARING AIDS- NO DED, 80% COINS MAX 1 PER EAR EVERY 3 YRS FOR MEMBERS 19+  HEARING EXAM- $35 COPAY 100% COINS, 1 PER EAR EVERY 3 YRS    Batteries/Accessories Coverage:  NOT COVERED    Representative name: DAYA Porter reference: [id number]  Date of contact: 8/26/2020    Insurance verified today by VINICIUS MARTINEZ    Additional Information:  REFERRAL NOT NEEDED FROM PRIMARY CARE PHYSICIAN   [facility], PHONE [phone]    The information provided is an estimate of benefits. This does not guarantee coverage; the insurance company will make the final determination of coverage to include patient responsibility of payment by the patient.   [facility] is not responsible for the decisions made by the insurance company regarding coverage.  Any changes to coverage (new plan or new policy) or procedures may void the contents provided in this letter.     Term Definitions  Patient responsibility: Can include but not limited to: co-pays, co-insurance, deductibles, out-of-pocket and non-covered and/or policy exclusions.

## 2021-06-20 NOTE — LETTER
Letter by Kath Arora RN at      Author: Kath Arora RN Service: -- Author Type: --    Filed:  Encounter Date: 7/15/2020 Status: (Other)       7/15/2020        Leeann Mckeon  [address] [facility]  [place] [address]    This letter provides a written record that you were tested for COVID-19 on 7/11/2020.     Your result was negative. This means that we didnt find the virus that causes COVID-19 in your sample. A test may show negative when you do actually have the virus. This can happen when the virus is in the early stages of infection, before you feel illness symptoms.    If you have symptoms   Stay home and away from others (self-isolate) until you meet ALL of the guidelines below:    Youve had no fever--and no medicine that reduces fever--for 3 full days (72 hours). And ?    Your other symptoms have gotten better. For example, your cough or breathing has improved. And?    At least 10 days have passed since your symptoms started.    During this time:    Stay home. Dont go to work, school or anywhere else.     Stay in your own room, including for meals. Use your own bathroom if you can.    Stay away from others in your home. No hugging, kissing or shaking hands. No visitors.    Clean high touch surfaces often (doorknobs, counters, handles, etc.). Use a household cleaning spray or wipes. You can find a full list on the EPA website at www.epa.gov/pesticide-registration/list-n-disinfectants-use-against-sars-cov-2.    Cover your mouth and nose with a mask, tissue or washcloth to avoid spreading germs.    Wash your hands and face often with soap and water.    Going back to work  Check with your employer for any guidelines to follow for going back to work.    Employers: This document serves as formal notice that your employee tested negative for COVID-19, as of the testing date shown above.

## 2021-06-21 NOTE — PROGRESS NOTES
[facility] Follow Up Note    Assessment/Plan:    1. Annual physical exam  We will do fasting blood work.  She is up-to-date on everything else.  Last colonoscopy was in 2016 and 5-year follow-up was recommended.  She had normal Pap smear negative HPV in 2015 and is not due until 2020.  We discussed shingles vaccine.  Mammogram was done today.    2. Hypertension  Well-controlled on lisinopril hydrochlorothiazide.  - Lipid Cascade; Future  - Comprehensive Metabolic Panel; Future  - Thyroid Stimulating Hormone (TSH); Future    3. Gastroesophageal reflux disease without esophagitis  Chronic.  She requires omeprazole on a chronic basis  - HM2(CBC w/o Differential); Future  - Vitamin B12; Future    4. Hyperlipidemia LDL goal <130  Previous LDL was borderline at 168.  Weight loss was recommended  - Lipid Cascade; Future  - Comprehensive Metabolic Panel; Future    5. Vitamin D deficiency  - Vitamin D, Total (25-Hydroxy); Future    6. BMI 40.0-44.9, adult (H)  Discussed weight loss clinics, she will think about it.  - Lipid Cascade; Future  - Comprehensive Metabolic Panel; Future    Melia Lambert MD    Chief Complaint:  Chief Complaint   Patient presents with     Establish Care     Medication Management     questioning decreasing omperzole from 2 tabs-1 had gallbladder removed 8-2-17       History of Present Illness:  Leeann is a 63 y.o. female who is currently here to meet me for the first time and have a physical exam done.  She is to see Dr. Ortez.  She has a history of increased BMI, high blood pressure, osteopenia, osteoarthritis in her knees, acid reflux, colon polyps, decreased hearing and vitamin D deficiency.    Patient does not have any concerns or complaints today.    Patient is on high-dose lisinopril hydrochlorothiazide medication and blood pressure is well controlled.  She denies snoring at night.    She has chronic acid reflux which is controlled with chronic omeprazole.    She does have  bilateral knee arthritis and recently suffered from hamstring muscle tear and retraction.  She saw orthopedics and they did not recommend surgery.  Currently she is in physical therapy but feels off balance.  No recent falls.  She takes ibuprofen 400 mg twice a day on a regular basis.  Denies any GI side effects from it.    She does have morbid obesity with BMI of 44.  She has attempted diet earlier this year but it was only for a few weeks.  Currently we discussed weight loss clinic and patient will think about it.  She is unable to exercise much due to knee arthritis and hamstring injury    Review of Systems:  A comprehensive review of systems was performed and was otherwise negative.  She denies any problems with his breathing or chest pains.  Mood is good.  No somnolence during the day.  No vaginal problems or urinary symptoms.    PFSH:  Social History: Reviewed  Social History     Tobacco Use   Smoking Status Never Smoker   Smokeless Tobacco Never Used     Social History     Social History Narrative    She is . She has 2 children, a boy and a girl. 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  She does not smoke cigarettes and has about 2 alcoholic beverages a week and tries to exercise.       Past History: Reviewed  Current Outpatient Medications   Medication Sig Dispense Refill     cholecalciferol, vitamin D3, (VITAMIN D3) 1,000 unit capsule Take 4,000 Units by mouth daily.        ibuprofen (ADVIL,MOTRIN) 200 MG tablet Take 200 mg by mouth every 6 (six) hours as needed for pain.       lisinopril-hydrochlorothiazide (PRINZIDE,ZESTORETIC) 20-25 mg per tablet Take 2 tablets by mouth daily. 180 tablet 0     MULTIVITAMIN ORAL Take 1 tablet by mouth daily.       omeprazole (PRILOSEC) 20 MG capsule TAKE 1 CAPSULE BY MOUTH TWICE DAILY BEFORE MEALS. 180 capsule 3     No current facility-administered medications for this visit.        Family History:  Reviewed    Physical Exam:    Vitals:    11/27/18 1445   BP: 100/70   Pulse: 93   SpO2: 99%   Weight: (!) 245 lb (111.1 kg)     Wt Readings from Last 3 Encounters:   11/27/18 (!) 245 lb (111.1 kg)   07/31/18 (!) 246 lb 3 oz (111.7 kg)   07/13/18 (!) 251 lb 3.2 oz (113.9 kg)     Body mass index is 44.81 kg/m .    Constitutional:  Reveals a pleasant female.  Vitals:  Per nursing notes.  HEENT:No cervical LAD, no thyromegaly,  conjunctiva is pink, no scleral icterus, TMs are visualized and normal bl, oropharynx is clear, no exudates, she wears hearing aids.  Cardiac:  Regular rate and rhythm,no murmurs, rubs, or gallops.Legs without edema. Palpation of the radial pulse regular.  Lungs: Clear to auscultation bl.  Respiratory effort normal.  Abdomen:positive BS, soft, nontender, nondistended.  No hepato-splenomagaly  Skin:   Without rash, bruise, or palpable lesions.  Rheumatologic: Normal joints and nails of the hands.  Neurologic:  Cranial nerves II-XII intact.     Psychiatric: affect appropriate, memory intact.   Patient refused breast exam because she already had mammogram done earlier    Data Review:    Analysis and Summary of Old Records (2): Yes    Records Requested (1): No    Other History Summarized (from other people in the room) (2): No    Radiology Tests Summarized (XRAY/CT/MRI/DXA) (1): No    Labs Reviewed (1): Yes    Medicine Tests Reviewed (EKG/ECHO/COLONOSCOPY/EGD) (1): Yes colonoscopy    Independent Review of EKG or X-RAY (2): No

## 2021-06-27 NOTE — PROGRESS NOTES
Progress Notes by Ortega Mayfield DO at 12/23/2018 10:30 AM     Author: Ortega Mayfield DO Service: -- Author Type: Physician    Filed: 12/26/2018  9:36 AM Encounter Date: 12/23/2018 Status: Signed    : [place]tega Mayfield DO (Physician)       Chief Complaint   Patient presents with   ? Rash     L upper shoulder area itching and tingle, concerned about shingles        History of Present Illness: Rooming staff notes reviewed. Patient has noted some red dots on left scapular area. Her symptoms started about 2-3 days ago.  Symptoms include burning and itching.  She had similar symptoms when she was diagnosed with shingles, treated with an antiviral agent which seemed to significantly help her symptoms.  She has gotten the shingles vaccination, but not the latest version of which tends to be more effective at preventing the condition.    Review of systems: See history of present illness, all others negative.     Current Outpatient Medications   Medication Sig Dispense Refill   ? cholecalciferol, vitamin D3, (VITAMIN D3) 1,000 unit capsule Take 4,000 Units by mouth daily.      ? cyanocobalamin, vitamin B-12, 2,500 mcg Chew Chew.     ? ibuprofen (ADVIL,MOTRIN) 200 MG tablet Take 200 mg by mouth every 6 (six) hours as needed for pain.     ? lisinopril-hydrochlorothiazide (PRINZIDE,ZESTORETIC) 20-25 mg per tablet Take 2 tablets by mouth daily. 180 tablet 0   ? MULTIVITAMIN ORAL Take 1 tablet by mouth daily.     ? omeprazole (PRILOSEC) 20 MG capsule TAKE 1 CAPSULE BY MOUTH TWICE DAILY BEFORE MEALS. 180 capsule 3   ? valACYclovir (VALTREX) 1000 MG tablet Take 1 tablet (1,000 mg total) by mouth 3 (three) times a day for 7 days. 21 tablet 0     No current facility-administered medications for this visit.      Past Medical History:   Diagnosis Date   ? Acid reflux    ? Arthritis     knee   ? Cholelithiasis    ? Essential hypertension       Past Surgical History:   Procedure Laterality Date   ? LAPAROSCOPIC CHOLECYSTECTOMY N/A  2017    Procedure: CHOLECYSTECTOMY, LAPAROSCOPIC;  Surgeon: Dawson Proctor MD;  Location: [facility];  Service:    ? [place] REMOVE TONSILS/ADENOIDS,<11 Y/O      Description: Tonsillectomy With Adenoidectomy;  Recorded: 2013;      Social History     Tobacco Use   ? Smoking status: Never Smoker   ? Smokeless tobacco: Never Used   Substance Use Topics   ? Alcohol use: Yes     Comment: socially   ? Drug use: No        Family History   Problem Relation Age of Onset   ? Osteosarcoma Brother          age 31   ? Diabetes Mother          age 81   ? Anemia Father          age 88   ? Hyperlipidemia Sister    ? Breast cancer Neg Hx        Vitals:    18 1156   BP: 124/64   Pulse: 68   Resp: 14   Temp: 97.7  F (36.5  C)   TempSrc: Oral   SpO2: 99%   Weight: (!) 240 lb 8 oz (109.1 kg)       EXAM:   General: Vital signs reviewed.  Patient is in no acute appearing distress.  Breathing appears nonlabored.  Patient is alert and oriented ×3.    Skin exam shows patient has several small red erythematous papular like areas over left scapular area, numbering 5-10.  No comedones, ulcers, or generalized erythema seen.    Assessment/Plan   1. Shingles (herpes zoster) polyneuropathy         Patient Instructions     See info in hand out also. Be seen again or call clinic in 3 days if symptoms are not better, sooner if feeling any worse.      Patient Education     Shingles (Herpes Zoster)     Talk to your healthcare provider about the shingles vaccine.     Shingles is also called herpes zoster. It is a painful skin rash caused by the herpes zoster virus. This is the same virus that causes chickenpox. After a person has chickenpox, the virus remains inactive in the nerve cells. Years later, the virus can become active again and travel to the skin. Most people have shingles only once, but it is possible to have it more than once.  What are the risk factors for shingles?  Anyone who has ever had chickenpox can  develop shingles. But your risk is greater if you:    Are 50 years of age or older    Have an illness that weakens your immune system, such as HIV/AIDS    Have cancer, especially Hodgkin disease or lymphoma    Take medicines that weaken your immune system  What are the symptoms of shingles?    The first sign of shingles is usually pain, burning, tingling, or itching on one part of your face or body. You may also feel as if you have the flu, with fever and chills.    A red rash with small blisters appears within a few days. The rash may appear as follows:   ? The blisters can occur anywhere, but theyre most common on the back, chest, or abdomen.  ? They usually appear on only one side of the body, spreading along the nerve pathway where the virus was inactive.   ? The rash can also form around an eye, along one side of the face or neck, or in the mouth.  ? In a few people, usually those with weakened immune systems, shingles appear on more than one part of the body at once.    After a few days, the blisters become dry and form a crust. The crust falls off in days to weeks. The blisters generally do not leave scars.  How is shingles treated?  For most people, shingles heals on its own in a few weeks. But treatment is recommended to help relieve pain, speed healing, and reduce the risk of complications. Antiviral medicines are prescribed within the first 72 hours of the appearance of the rash. To lessen symptoms:    Apply ice packs (wrapped in a thin towel) or cool compresses, or soak in a cool bath.    Use calamine lotion to calm itchy skin.    Ask your healthcare provider about over-the-counter pain relievers. If your pain is severe, your healthcare provider may prescribe stronger pain medicines.  What are the complications of shingles?  Shingles often goes away with no lasting effects. But some people have serious problems long after the blisters have healed:    Postherpetic neuralgia. This is the most common  complication. It is severe nerve pain at the place where the rash used to be. It can last for months, or even years after you have had shingles. Medicines can be prescribed to help relieve the pain and improve quality of life.    Bacterial infection. Shingles blisters may become infected with bacteria. Antibiotic medicine is used to treat the infection.    Eye problems. A person with shingles on the face should see his or her healthcare provider right away. Shingles can cause serious problems with vision, and even blindness.  Very rarely shingles can also lead to pneumonia, hearing problems, brain inflammation, or even death.   When to seek medical care  Contact your healthcare provider if you experience any of the following:    Symptoms that dont go away with treatment    A rash or blisters near your eye    Increased drainage, fever, or rash after treatment, or severe pain that doesnt go away   How can shingles be prevented?  You can only get shingles if you have had chickenpox in the past. Those who have never had chickenpox can get the virus from you. Although instead of developing shingles, the person may get chickenpox. Until your blisters form scabs, avoid contact with others, especially the following:    Pregnant women who have never had chickenpox or the vaccine    Infants who were born early (prematurely) or who had low weight at birth    People with weak immune system (for example, people receiving chemotherapy for cancer, people who have had organ transplants, or people with HIV infections)     The shingles vaccine  Shingles vaccines are available to help prevent shingles or make it less painful. Vaccination is recommended for adults 50 and older, even if you've had shingles in the past. Talk with your healthcare provider about the most appropriate time for you to get vaccinated, and which vaccine is best for you.   Date Last Reviewed: 10/1/2016    4624-7552 [employer]. [address], [place] [address]. All rights reserved. This information is not intended as a substitute for professional medical care. Always follow your healthcare professional's instructions.              Ortega Mayfield,

## 2021-06-28 NOTE — PROGRESS NOTES
Progress Notes by Dawson Carrillo PA-C at 2020  5:30 PM     Author: Dawson Carrillo PA-C Service: -- Author Type: Physician Assistant    Filed: 2020  6:57 PM Encounter Date: 2020 Status: Signed    : Dawson Carrillo PA-C (Physician Assistant)       Subjective:      Patient ID: Leeann Mckeon is a 64 y.o. female.    Chief Complaint:    HPI     Leeann Mckeon is a 64 y.o. female who presents today complaining of 3-week history of dry nonproductive cough.  Patient states that she has had wheezing and chest congestion that is worse at night when she lays down.  She was questioning whether she had a medication induced cough from her medications.  She does take Lipitor and lisinopril.  She was concerned that it may be the Lipitor.  However review of the medications show that she is on a ACE inhibitor.  At this time she does not have fever chills night sweats fatigue nausea vomiting or diarrhea.  No other flulike symptoms at this time.    She has been using over-the-counter Delsym without relief.      Past Medical History:   Diagnosis Date   ? Acid reflux    ? Arthritis     knee   ? Cholelithiasis    ? Essential hypertension        Past Surgical History:   Procedure Laterality Date   ? LAPAROSCOPIC CHOLECYSTECTOMY N/A 2017    Procedure: CHOLECYSTECTOMY, LAPAROSCOPIC;  Surgeon: Dawson Proctor MD;  Location: [facility];  Service:    ? IL REMOVE TONSILS/ADENOIDS,<11 Y/O      Description: Tonsillectomy With Adenoidectomy;  Recorded: 2013;       Family History   Problem Relation Age of Onset   ? Osteosarcoma Brother          age 31   ? Diabetes Mother          age 81   ? Anemia Father          age 88   ? Hyperlipidemia Sister    ? Breast cancer Neg Hx        Social History     Tobacco Use   ? Smoking status: Never Smoker   ? Smokeless tobacco: Never Used   Substance Use Topics   ? Alcohol use: Yes     Comment: socially   ? Drug use: No       Review of Systems  As above in HPI,  otherwise balance of Review of Systems are negative.    Objective:     /71   Pulse 88   Temp 98.2  F (36.8  C) (Oral)   Resp 22   Wt (!) 240 lb (108.9 kg)   SpO2 97%   BMI 46.48 kg/m      Physical Exam  General: Patient is resting comfortably no acute distress is afebrile  HEENT: Head is normocephalic atraumatic   eyes are PERRL EOMI sclera anicteric   TMs are clear bilaterally  Throat is without pharyngeal wall erythema and no exudate  No cervical lymphadenopathy present  LUNGS: Clear to auscultation bilaterally  HEART: Regular rate and rhythm  Skin: Without rash non-diaphoretic      Assessment:     Procedures    The encounter diagnosis was Acute bronchitis, unspecified organism.    Plan:     1. Acute bronchitis, unspecified organism  azithromycin (ZITHROMAX) 500 MG tablet    albuterol (PROAIR HFA;PROVENTIL HFA;VENTOLIN HFA) 90 mcg/actuation inhaler    benzonatate (TESSALON PERLES) 100 MG capsule       Had a conversation with the patient stating that I do not think the Lipitor is the cause of her cough.  Additionally ACE inhibitor would be the most common cause for that.  Lastly, there is widespread flu throughout [place] at this time.  She is having a respiratory cough.  Her other symptoms have resolved after the first week but the cough has persisted.  Advised her that the cough can persist with the fatigue.  Should be treated for the cough and have indication for return as directed questions were answered to her satisfaction before discharge.    Patient Instructions   You were seen today for acute bronchitis.     Symptom management:  - Get plenty of rest  - Avoid smoking and second hand smoke  - May take tylenol or ibuprofen for fever/discomfort  - Drink plenty of non-caffeinated fluids  - Use nasal steroid spray for sinus congestion  - Albuterol inhaler may be used every 6 hours as needed for chest tightness      Reasons to be seen in the emergency room:  - Develop a fever of 100.4 or higher  -  Cough changes, coughing up blood, or become short of breath  - Neck stiffness  - Chest pain  - Severe headache  - Unable to tolerate eating or drinking fluids    Otherwise, if no symptom improvement after 5 days, follow-up with your primary care provider.        Patient Education     Bronchitis, Antibiotic Treatment (Adult)    Bronchitis is an infection of the air passages (bronchial tubes) in your lungs. It often occurs when you have a cold. This illness is contagious during the first few days and is spread through the air by coughing and sneezing, or by direct contact (touching the sick person and then touching your own eyes, nose, or mouth).  Symptoms of bronchitis include cough with mucus (phlegm) and low-grade fever. Bronchitis usually lasts 7 to 14 days. Mild cases can be treated with simple home remedies. More severe infection is treated with an antibiotic.  Home care  Follow these guidelines when caring for yourself at home:    If your symptoms are severe, rest at home for the first 2 to 3 days. When you go back to your usual activities, don't let yourself get too tired.    Do not smoke. Also avoid being exposed to secondhand smoke.    You may use over-the-counter medicines to control fever or pain, unless another medicine was prescribed. If you have chronic liver or kidney disease or have ever had a stomach ulcer or gastrointestinal bleeding, talk with your healthcare provider before using these medicines. Also talk to your provider if you are taking medicine to prevent blood clots. Aspirin should never be given to anyone younger than 18 years of age who is ill with a viral infection or fever. It may cause severe liver or brain damage.    Your appetite may be poor, so a light diet is fine. Avoid dehydration by drinking 6 to 8 glasses of fluids per day (such as water, soft drinks, sports drinks, juices, tea, or soup). Extra fluids will help loosen secretions in the nose and lungs.    Over-the-counter cough,  cold, and sore-throat medicines will not shorten the length of the illness, but they may be helpful to reduce symptoms. (Note: Do not use decongestants if you have high blood pressure.)    Finish all antibiotic medicine. Do this even if you are feeling better after only a few days.  Follow-up care  Follow up with your healthcare provider, or as advised. If you had an X-ray or ECG (electrocardiogram), a specialist will review it. You will be notified of any new findings that may affect your care.  If you are age 65 or older, or if you have a chronic lung disease or condition that affects your immune system, or you smoke, ask your healthcare provider about getting a pneumococcal vaccine and a yearly flu shot (influenza vaccine).  When to seek medical advice  Call your healthcare provider right away if any of these occur:    Fever of 100.4 F (38 C) or higher, or as directed by your healthcare provider    Coughing up increased amounts of colored sputum    Weakness, drowsiness, headache, facial pain, ear pain, or a stiff neck  Call 911  Call 911 if any of these occur.    Coughing up blood    Worsening weakness, drowsiness, headache, or stiff neck    Trouble breathing, wheezing, or pain with breathing  Date Last Reviewed: 9/13/2015 2000-2017 [employer]. [address] [place] [address], [place] [address]. All rights reserved. This information is not intended as a substitute for professional medical care. Always follow your healthcare professional's instructions.

## 2021-07-03 NOTE — ADDENDUM NOTE
Addendum Note by Ginny Lambert MD at 12/18/2020  9:55 AM     Author: Ginny Lambert MD Service: -- Author Type: Physician    Filed: 12/18/2020  9:55 AM Encounter Date: 12/17/2020 Status: Signed    : Ginny Lambert MD (Physician)    Addended by: GINNY LAMBERT on: 12/18/2020 09:55 AM        Modules accepted: Orders

## 2021-07-03 NOTE — ADDENDUM NOTE
Addendum Note by Ginyn Lambert MD at 3/9/2020  7:28 PM     Author: Ginny Lambert MD Service: -- Author Type: Physician    Filed: 3/9/2020  7:28 PM Encounter Date: 3/9/2020 Status: Signed    : Ginny Lambert MD (Physician)    Addended by: GINNY LAMBERT on: 3/9/2020 07:28 PM        Modules accepted: Orders

## 2021-07-03 NOTE — ADDENDUM NOTE
Addendum Note by Boubacar Haq PA-C at 1/14/2020  5:30 PM     Author: Boubacar Haq PA-C Service: -- Author Type: Physician Assistant    Filed: 1/14/2020  6:58 PM Encounter Date: 1/14/2020 Status: Signed    : Boubacar Haq PA-C (Physician Assistant)    Addended by: BOUBACAR HAQ on: 1/14/2020 06:58 PM        Modules accepted: Level of Service

## 2021-07-23 ENCOUNTER — MYC MEDICAL ADVICE (OUTPATIENT)
Dept: INTERNAL MEDICINE | Facility: CLINIC | Age: 66
End: 2021-07-23

## 2021-08-13 DIAGNOSIS — I10 ESSENTIAL HYPERTENSION: ICD-10-CM

## 2021-08-16 RX ORDER — LISINOPRIL AND HYDROCHLOROTHIAZIDE 20; 25 MG/1; MG/1
TABLET ORAL
Qty: 180 TABLET | Refills: 2 | Status: SHIPPED | OUTPATIENT
Start: 2021-08-16 | End: 2021-10-20

## 2021-08-16 NOTE — TELEPHONE ENCOUNTER
"Routing refill request to provider for review/approval because:  Creatinine out of range    Last Written Prescription Date:  11/5/20  Last Fill Quantity: 180,  # refills: 2   Last office visit provider:  12/14/20     Requested Prescriptions   Pending Prescriptions Disp Refills     lisinopril-hydrochlorothiazide (ZESTORETIC) 20-25 MG tablet [Pharmacy Med Name: LISINOPRIL-HCTZ 20-25 MG TA 20-25 Tablet] 180 tablet 2     Sig: TAKE 2 TABLETS BY MOUTH DAILY       Diuretics (Including Combos) Protocol Failed - 8/13/2021  8:16 AM        Failed - Normal serum creatinine on file in past 12 months     Recent Labs   Lab Test 12/16/20  1009   CR 1.51*              Passed - Blood pressure under 140/90 in past 12 months     BP Readings from Last 3 Encounters:   12/14/20 128/76   03/05/20 98/60   01/14/20 104/71                 Passed - Recent (12 mo) or future (30 days) visit within the authorizing provider's specialty     Patient has had an office visit with the authorizing provider or a provider within the authorizing providers department within the previous 12 mos or has a future within next 30 days. See \"Patient Info\" tab in inbasket, or \"Choose Columns\" in Meds & Orders section of the refill encounter.              Passed - Medication is active on med list        Passed - Patient is age 18 or older        Passed - No active pregancy on record        Passed - Normal serum potassium on file in past 12 months     Recent Labs   Lab Test 12/16/20  1009   POTASSIUM 3.8                    Passed - Normal serum sodium on file in past 12 months     Recent Labs   Lab Test 12/16/20  1009                 Passed - No positive pregnancy test in past 12 months       ACE Inhibitors (Including Combos) Protocol Failed - 8/13/2021  8:16 AM        Failed - Normal serum creatinine on file in past 12 months     Recent Labs   Lab Test 12/16/20  1009   CR 1.51*       Ok to refill medication if creatinine is low          Passed - Blood pressure " "under 140/90 in past 12 months     BP Readings from Last 3 Encounters:   12/14/20 128/76   03/05/20 98/60   01/14/20 104/71                 Passed - Recent (12 mo) or future (30 days) visit within the authorizing provider's specialty     Patient has had an office visit with the authorizing provider or a provider within the authorizing providers department within the previous 12 mos or has a future within next 30 days. See \"Patient Info\" tab in inbasket, or \"Choose Columns\" in Meds & Orders section of the refill encounter.              Passed - Medication is active on med list        Passed - Patient is age 18 or older        Passed - No active pregnancy on record        Passed - Normal serum potassium on file in past 12 months     Recent Labs   Lab Test 12/16/20  1009   POTASSIUM 3.8             Passed - No positive pregnancy test within past 12 months             Sara Harmon RN 08/16/21 2:15 PM  "

## 2021-09-19 ENCOUNTER — HEALTH MAINTENANCE LETTER (OUTPATIENT)
Age: 66
End: 2021-09-19

## 2021-10-20 ENCOUNTER — TELEPHONE (OUTPATIENT)
Dept: INTERNAL MEDICINE | Facility: CLINIC | Age: 66
End: 2021-10-20

## 2021-10-20 ENCOUNTER — OFFICE VISIT (OUTPATIENT)
Dept: INTERNAL MEDICINE | Facility: CLINIC | Age: 66
End: 2021-10-20
Payer: COMMERCIAL

## 2021-10-20 VITALS
HEART RATE: 70 BPM | OXYGEN SATURATION: 99 % | HEIGHT: 60 IN | WEIGHT: 226.6 LBS | SYSTOLIC BLOOD PRESSURE: 100 MMHG | BODY MASS INDEX: 44.49 KG/M2 | DIASTOLIC BLOOD PRESSURE: 64 MMHG

## 2021-10-20 DIAGNOSIS — K21.9 GASTROESOPHAGEAL REFLUX DISEASE WITHOUT ESOPHAGITIS: ICD-10-CM

## 2021-10-20 DIAGNOSIS — M25.562 LEFT KNEE PAIN, UNSPECIFIED CHRONICITY: Primary | ICD-10-CM

## 2021-10-20 DIAGNOSIS — Z12.31 ENCOUNTER FOR SCREENING MAMMOGRAM FOR BREAST CANCER: ICD-10-CM

## 2021-10-20 DIAGNOSIS — I10 ESSENTIAL HYPERTENSION: ICD-10-CM

## 2021-10-20 DIAGNOSIS — N18.31 STAGE 3A CHRONIC KIDNEY DISEASE (H): ICD-10-CM

## 2021-10-20 DIAGNOSIS — E78.5 HYPERLIPIDEMIA LDL GOAL <130: ICD-10-CM

## 2021-10-20 DIAGNOSIS — D64.9 ANEMIA, UNSPECIFIED TYPE: ICD-10-CM

## 2021-10-20 LAB
ALBUMIN SERPL-MCNC: 3.6 G/DL (ref 3.5–5)
ALP SERPL-CCNC: 86 U/L (ref 45–120)
ALT SERPL W P-5'-P-CCNC: <9 U/L (ref 0–45)
ANION GAP SERPL CALCULATED.3IONS-SCNC: 13 MMOL/L (ref 5–18)
AST SERPL W P-5'-P-CCNC: 12 U/L (ref 0–40)
BASOPHILS # BLD AUTO: 0 10E3/UL (ref 0–0.2)
BASOPHILS NFR BLD AUTO: 0 %
BILIRUB DIRECT SERPL-MCNC: 0.2 MG/DL
BILIRUB SERPL-MCNC: 0.5 MG/DL (ref 0–1)
BUN SERPL-MCNC: 28 MG/DL (ref 8–22)
CALCIUM SERPL-MCNC: 10.1 MG/DL (ref 8.5–10.5)
CHLORIDE BLD-SCNC: 107 MMOL/L (ref 98–107)
CHOLEST SERPL-MCNC: 270 MG/DL
CO2 SERPL-SCNC: 21 MMOL/L (ref 22–31)
CREAT SERPL-MCNC: 1.15 MG/DL (ref 0.6–1.1)
EOSINOPHIL # BLD AUTO: 0.2 10E3/UL (ref 0–0.7)
EOSINOPHIL NFR BLD AUTO: 3 %
ERYTHROCYTE [DISTWIDTH] IN BLOOD BY AUTOMATED COUNT: 12.5 % (ref 10–15)
FASTING STATUS PATIENT QL REPORTED: YES
GFR SERPL CREATININE-BSD FRML MDRD: 50 ML/MIN/1.73M2
GLUCOSE BLD-MCNC: 96 MG/DL (ref 70–125)
HCT VFR BLD AUTO: 36.1 % (ref 35–47)
HDLC SERPL-MCNC: 65 MG/DL
HGB BLD-MCNC: 11.6 G/DL (ref 11.7–15.7)
IMM GRANULOCYTES # BLD: 0 10E3/UL
IMM GRANULOCYTES NFR BLD: 0 %
LDLC SERPL CALC-MCNC: 185 MG/DL
LYMPHOCYTES # BLD AUTO: 1.8 10E3/UL (ref 0.8–5.3)
LYMPHOCYTES NFR BLD AUTO: 26 %
MCH RBC QN AUTO: 31 PG (ref 26.5–33)
MCHC RBC AUTO-ENTMCNC: 32.1 G/DL (ref 31.5–36.5)
MCV RBC AUTO: 97 FL (ref 78–100)
MONOCYTES # BLD AUTO: 0.4 10E3/UL (ref 0–1.3)
MONOCYTES NFR BLD AUTO: 6 %
NEUTROPHILS # BLD AUTO: 4.4 10E3/UL (ref 1.6–8.3)
NEUTROPHILS NFR BLD AUTO: 65 %
PLATELET # BLD AUTO: 274 10E3/UL (ref 150–450)
POTASSIUM BLD-SCNC: 3.7 MMOL/L (ref 3.5–5)
PROT SERPL-MCNC: 6.8 G/DL (ref 6–8)
RBC # BLD AUTO: 3.74 10E6/UL (ref 3.8–5.2)
SODIUM SERPL-SCNC: 141 MMOL/L (ref 136–145)
TRIGL SERPL-MCNC: 101 MG/DL
TSH SERPL DL<=0.005 MIU/L-ACNC: 0.86 UIU/ML (ref 0.3–5)
VIT B12 SERPL-MCNC: 451 PG/ML (ref 213–816)
WBC # BLD AUTO: 6.8 10E3/UL (ref 4–11)

## 2021-10-20 PROCEDURE — 90682 RIV4 VACC RECOMBINANT DNA IM: CPT | Performed by: INTERNAL MEDICINE

## 2021-10-20 PROCEDURE — 82607 VITAMIN B-12: CPT | Performed by: INTERNAL MEDICINE

## 2021-10-20 PROCEDURE — 99214 OFFICE O/P EST MOD 30 MIN: CPT | Mod: 25 | Performed by: INTERNAL MEDICINE

## 2021-10-20 PROCEDURE — 83550 IRON BINDING TEST: CPT | Performed by: INTERNAL MEDICINE

## 2021-10-20 PROCEDURE — 36415 COLL VENOUS BLD VENIPUNCTURE: CPT | Performed by: INTERNAL MEDICINE

## 2021-10-20 PROCEDURE — 90471 IMMUNIZATION ADMIN: CPT | Performed by: INTERNAL MEDICINE

## 2021-10-20 PROCEDURE — 80061 LIPID PANEL: CPT | Performed by: INTERNAL MEDICINE

## 2021-10-20 PROCEDURE — 80050 GENERAL HEALTH PANEL: CPT | Performed by: INTERNAL MEDICINE

## 2021-10-20 PROCEDURE — 82248 BILIRUBIN DIRECT: CPT | Performed by: INTERNAL MEDICINE

## 2021-10-20 RX ORDER — ACETAMINOPHEN 500 MG
500 TABLET ORAL DAILY PRN
COMMUNITY

## 2021-10-20 RX ORDER — FLUTICASONE PROPIONATE 50 MCG
1 SPRAY, SUSPENSION (ML) NASAL DAILY
COMMUNITY
Start: 2020-07-15 | End: 2022-04-25

## 2021-10-20 RX ORDER — LISINOPRIL AND HYDROCHLOROTHIAZIDE 20; 25 MG/1; MG/1
2 TABLET ORAL DAILY
Qty: 180 TABLET | Refills: 3 | Status: SHIPPED | OUTPATIENT
Start: 2021-10-20 | End: 2022-12-06

## 2021-10-20 RX ORDER — DIPHENOXYLATE HYDROCHLORIDE AND ATROPINE SULFATE 2.5; .025 MG/1; MG/1
1 TABLET ORAL DAILY
COMMUNITY

## 2021-10-20 ASSESSMENT — MIFFLIN-ST. JEOR: SCORE: 1503.1

## 2021-10-20 NOTE — LETTER
October 23, 2021      Leeann Mckeon  [address] [address]        Dear ,    We are writing to inform you of your test results.    Leeann,  Anemia is improving, iron levels are normal. B12 level is normal.  Thyroid, liver functions are good.  Kidney function is much improved from last year. Potassium level is good.    LDL cholesterol is high, are you taking Lipitor? If yes, we can change it to Crestor. Let me know if would like to switch.    DR ROSARIO Vee Orders   Basic metabolic panel  (Ca, Cl, CO2, Creat, Gluc, K, Na, BUN)   Result Value Ref Range    Sodium 141 136 - 145 mmol/L    Potassium 3.7 3.5 - 5.0 mmol/L    Chloride 107 98 - 107 mmol/L    Carbon Dioxide (CO2) 21 (L) 22 - 31 mmol/L    Anion Gap 13 5 - 18 mmol/L    Urea Nitrogen 28 (H) 8 - 22 mg/dL    Creatinine 1.15 (H) 0.60 - 1.10 mg/dL    Calcium 10.1 8.5 - 10.5 mg/dL    Glucose 96 70 - 125 mg/dL    GFR Estimate 50 (L) >60 mL/min/1.73m2      Comment:      As of July 11, 2021, eGFR is calculated by the CKD-EPI creatinine equation, without race adjustment. eGFR can be influenced by muscle mass, exercise, and diet. The reported eGFR is an estimation only and is only applicable if the renal function is stable.   Vitamin B12   Result Value Ref Range    Vitamin B12 451 213 - 816 pg/mL   Iron and iron binding capacity   Result Value Ref Range    Iron 92 35 - 180 ug/dL    Iron Binding Capacity 313 240 - 430 ug/dL    Iron Sat Index 29 15 - 46 %   Lipid panel reflex to direct LDL Fasting   Result Value Ref Range    Cholesterol 270 (H) <=199 mg/dL    Triglycerides 101 <=149 mg/dL    Direct Measure HDL 65 >=50 mg/dL      Comment:      HDL Cholesterol Reference Range:     0-2 years:   No reference ranges established for patients under 2 years old  at [facility] for lipid analytes.    2-8 years:  Greater than 45 mg/dL     18 years and older:   Female: Greater than or equal to 50 mg/dL   Male:   Greater than or equal to 40 mg/dL     LDL Cholesterol Calculated 185 (H) <=129 mg/dL    Patient Fasting > 8hrs? Yes    TSH   Result Value Ref Range    TSH 0.86 0.30 - 5.00 uIU/mL   Hepatic panel (Albumin, ALT, AST, Bili, Alk Phos, TP)   Result Value Ref Range    Bilirubin Total 0.5 0.0 - 1.0 mg/dL    Bilirubin Direct 0.2 <=0.5 mg/dL    Protein Total 6.8 6.0 - 8.0 g/dL    Albumin 3.6 3.5 - 5.0 g/dL    Alkaline Phosphatase 86 45 - 120 U/L    AST 12 0 - 40 U/L    ALT <9 0 - 45 U/L   CBC with platelets and differential   Result Value Ref Range    WBC Count 6.8 4.0 - 11.0 10e3/uL    RBC Count 3.74 (L) 3.80 - 5.20 10e6/uL    Hemoglobin 11.6 (L) 11.7 - 15.7 g/dL    Hematocrit 36.1 35.0 - 47.0 %    MCV 97 78 - 100 fL    MCH 31.0 26.5 - 33.0 pg    MCHC 32.1 31.5 - 36.5 g/dL    RDW 12.5 10.0 - 15.0 %    Platelet Count 274 150 - 450 10e3/uL    % Neutrophils 65 %    % Lymphocytes 26 %    % Monocytes 6 %    % Eosinophils 3 %    % Basophils 0 %    % Immature Granulocytes 0 %    Absolute Neutrophils 4.4 1.6 - 8.3 10e3/uL    Absolute Lymphocytes 1.8 0.8 - 5.3 10e3/uL    Absolute Monocytes 0.4 0.0 - 1.3 10e3/uL    Absolute Eosinophils 0.2 0.0 - 0.7 10e3/uL    Absolute Basophils 0.0 0.0 - 0.2 10e3/uL    Absolute Immature Granulocytes 0.0 <=0.0 10e3/uL       If you have any questions or concerns, please call the clinic at the number listed above.       Sincerely,      Melia Lambert MD

## 2021-10-20 NOTE — PATIENT INSTRUCTIONS
1. Check with gastroenetrologist when next EGD is due.    2. See orthopedist for knee injections. Continue off NSAIDs.    3. Get Moderna booster in the next 2 weeks (once final CDC recommendations are in).    4. Schedule mammogram is December.     5. Try weight loss clinic to work with nutritionist.

## 2021-10-20 NOTE — PROGRESS NOTES
[facility] Follow Up Note    Assessment/Plan:    1. Left and right knee pain, unspecified chronicity  Patient has had right hamstring tear which affects her gait and balance and strength.  She also has increased BMI.  Discussed weight loss clinic.  She will follow up with his orthopedist for injections.    2. Stage 3a chronic kidney disease (H)  She did have family history of dialysis in her mom.  She is not diabetic but has been on lisinopril hydrochlorothiazide, Advil, PPI and increased BMI.  All those factors have risk for kidney disease.  Since she stopped Advil kidney function improved from creatinine 1.5-1.17 which is very encouraging.  She did have renal ultrasound done at [facility] which was normal.    3. Essential hypertension  Blood pressure is well controlled.  Discussed if she loses weight and blood pressure at home is 100 or less to let us know we can decrease her blood pressure pill.  - lisinopril-hydrochlorothiazide (ZESTORETIC) 20-25 MG tablet; Take 2 tablets by mouth daily  Dispense: 180 tablet; Refill: 3  - Basic metabolic panel  (Ca, Cl, CO2, Creat, Gluc, K, Na, BUN)  - TSH    4. Gastroesophageal reflux disease without esophagitis  Per patient she has had Schwartz's esophagus in the past.  She had endoscopy done in 2011 and follow-up for possible Schwartz's disease, biopsies were negative for Schwartz's.  She is up-to-date on colonoscopy.  Discussed that normally if you have Schwartz's endoscopy would be checked every 3 years.  - omeprazole (PRILOSEC) 20 MG DR capsule; Take 1 capsule (20 mg) by mouth daily  Dispense: 90 capsule; Refill: 2  - CBC with platelets and differential  - Vitamin B12  - Iron and iron binding capacity    5. Anemia, unspecified type  Mild anemia noted in the past.  She is up-to-date on colonoscopy.  SPEP was negative.  - CBC with platelets and differential  - Vitamin B12  - Iron and iron binding capacity  - TSH    6. Hyperlipidemia LDL goal <130  Is on Lipitor  -  Lipid panel reflex to direct LDL Fasting  - Hepatic panel (Albumin, ALT, AST, Bili, Alk Phos, TP)    7. Encounter for screening mammogram for breast cancer  Discussed to continue annual mammograms giving increased BMI. - MA Screen Bilateral w/[place]; Future    8. BMI 40.0-44.9, adult (H)  Discussed referral to weight loss clinic.Discussed to get a booster shot for Covid once final recommendation on Materna booster out.    - Comprehensive Weight Management; Future      Patient Instructions   1. Check with gastroenetrologist when next EGD is due.    2. See orthopedist for knee injections. Continue off NSAIDs.    3. Get Moderna booster in the next 2 weeks (once final CDC recommendations are in).    4. Schedule mammogram is December.     5. Try weight loss clinic to work with nutritionist.          Melia Lambert MD, MD    Chief Complaint:    Chief Complaint   Patient presents with     Physical       History of Present Illness:  Leeann is a 66 year old female  with history of increased BMI, high blood pressure, osteopenia, osteoarthritis in her knees, acid reflux, colon polyps, cervical polyp, decreased hearing/aids and vitamin D deficiency.She is here for follow up.  Her last physical was in December 2020.    Currently she has been having more knee pain.  She did have torn hamstring previously which affected her gait and strength in the right hip.  Currently her left and right knees are hurting.  She is to take a break if she is standing and walking for too long.  Currently she is off NSAIDs due to worsening renal function.  In the past she saw a [facility].    We did have her see a nephrologist due to worsening kidney function.  Creatinine peaked at 1.5.  After she came off NSAIDs repeat creatinine was 1.17.  We did discuss weight loss.  Ultrasound was normal.  She is on a PPI due to previous history of Schwartz's esophagus.  Blood pressure today was 100/64, at home is usually in 110 range.    Review of  Systems:  A comprehensive review of systems was performed and was otherwise negative    PFSH:  Social History: Viewed  History   Smoking Status     Never Smoker   Smokeless Tobacco     Never Used     Social History     Social History Narrative    She is . She has 2 children, a boy and a girl. 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  She does not smoke cigarettes and has about 2 alcoholic beverages a week and tries to exercise.       Past History: Reviewed  Current Outpatient Medications   Medication Sig Dispense Refill     albuterol (PROAIR HFA;PROVENTIL HFA;VENTOLIN HFA) 90 mcg/actuation inhaler [ALBUTEROL (PROAIR HFA;PROVENTIL HFA;VENTOLIN HFA) 90 MCG/ACTUATION INHALER] Inhale 2 puffs every 6 (six) hours as needed for wheezing. 1 each 0     atorvastatin (LIPITOR) 10 MG tablet [ATORVASTATIN (LIPITOR) 10 MG TABLET] Take 1 tablet (10 mg total) by mouth daily. 30 tablet 11     cholecalciferol, vitamin D3, (VITAMIN D3) 1,000 unit capsule [CHOLECALCIFEROL, VITAMIN D3, (VITAMIN D3) 1,000 UNIT CAPSULE] Take 4,000 Units by mouth daily.        cyanocobalamin, vitamin B-12, 2,500 mcg Chew [CYANOCOBALAMIN, VITAMIN B-12, 2,500 MCG CHEW] Chew.       fluticasone (FLONASE) 50 MCG/ACT nasal spray Spray 1 spray in nostril daily       lisinopril-hydrochlorothiazide (ZESTORETIC) 20-25 MG tablet Take 2 tablets by mouth daily 180 tablet 3     montelukast (SINGULAIR) 10 mg tablet [MONTELUKAST (SINGULAIR) 10 MG TABLET] TAKE ONE TABLET ( 10 MG TOTAL) BY MOUTH AT BEDTIME 90 tablet 2     MULTIVITAMIN ORAL [MULTIVITAMIN ORAL] Take 1 tablet by mouth daily.       omeprazole (PRILOSEC) 20 MG capsule [OMEPRAZOLE (PRILOSEC) 20 MG CAPSULE] TAKE 1 CAPSULE BY MOUTH 2 TIMES DAILY BEFORE MEALS. 180 capsule 2     omeprazole (PRILOSEC) 20 MG DR capsule Take 1 capsule (20 mg) by mouth daily 90 capsule 2     acetaminophen (TYLENOL) 500 MG tablet Take 500 mg by mouth daily as needed       Multiple  "Vitamin (MULTI-VITAMINS) TABS Take 1 tablet by mouth daily         Family History: Reviewed    Physical Exam:    Vitals:    10/20/21 0954   BP: 100/64   BP Location: Right arm   Patient Position: Sitting   Cuff Size: Adult Large   Pulse: 70   SpO2: 99%   Weight: 102.8 kg (226 lb 9.6 oz)   Height: 1.53 m (5' 0.24\")     Wt Readings from Last 3 Encounters:   10/20/21 102.8 kg (226 lb 9.6 oz)   12/14/20 103 kg (227 lb)   03/05/20 104.8 kg (231 lb)     Body mass index is 43.91 kg/m .    Constitutional:  Reveals a pleasant female.  Vitals:  Per nursing notes.  HEENT:No cervical LAD, no thyromegaly,  conjunctiva is pink, no scleral icterus, she wears hearing aids, oropharynx is clear, no exudates,   Cardiac:  Regular rate and rhythm,no murmurs, rubs, or gallops.  Legs with 1+ edema. Palpation of the radial pulse regular.  Lungs: Clear to auscultation bl.  Respiratory effort normal.  Abdomen:positive BS, soft, nontender, nondistended.  No hepato-splenomagaly  Skin:   Without rash, bruise, or palpable lesions.  Rheumatologic: Normal joints and nails of the hands.  Neurologic:  Cranial nerves II-XII intact.     Psychiatric: affect appropriate, memory intact.   Breast exam: No axillary lymphadenopathy, breast masses or skin changes appreciated.    Data Review:    Analysis and Summary of Old Records (2): yes      Records Requested (1): no      Other History Summarized (from other people in the room) (2): no    Radiology Tests Summarized (XRAY/CT/MRI/DXA) (1): us    Labs Reviewed (1): yes    Medicine Tests Reviewed (EKG/ECHO/COLONOSCOPY/EGD) (1): no    Independent Review of EKG or X-RAY (2): no      "

## 2021-10-21 LAB
IRON SATN MFR SERPL: 29 % (ref 15–46)
IRON SERPL-MCNC: 92 UG/DL (ref 35–180)
TIBC SERPL-MCNC: 313 UG/DL (ref 240–430)

## 2021-10-25 ENCOUNTER — MYC MEDICAL ADVICE (OUTPATIENT)
Dept: INTERNAL MEDICINE | Facility: CLINIC | Age: 66
End: 2021-10-25

## 2021-10-25 DIAGNOSIS — E78.5 HYPERLIPIDEMIA LDL GOAL <130: Primary | ICD-10-CM

## 2021-10-25 RX ORDER — ROSUVASTATIN CALCIUM 10 MG/1
10 TABLET, COATED ORAL DAILY
Qty: 90 TABLET | Refills: 2 | Status: SHIPPED | OUTPATIENT
Start: 2021-10-25 | End: 2022-11-18

## 2021-11-29 ENCOUNTER — TELEPHONE (OUTPATIENT)
Dept: AUDIOLOGY | Facility: CLINIC | Age: 66
End: 2021-11-29
Payer: COMMERCIAL

## 2021-11-29 DIAGNOSIS — H90.3 SENSORINEURAL HEARING LOSS (SNHL), BILATERAL: Primary | ICD-10-CM

## 2021-11-29 PROCEDURE — 99207 PR NO CHARGE LOS: CPT | Performed by: AUDIOLOGIST

## 2021-11-29 PROCEDURE — V5014 HEARING AID REPAIR/MODIFYING: HCPCS | Performed by: AUDIOLOGIST

## 2021-11-29 NOTE — TELEPHONE ENCOUNTER
Pt. Dropped off both hearing aids with the c/o a broken right . A new, right 2xS  is replaced with a retention lock and small open dome. Listening check revealed good level from the [facility] and clear sound quality from the [facility].     LVM to  both hearing aids at the  when convenient. Informed patient that she will be billed $100 for the out of warranty  replacement.     Margot Cruz, CCC-A  Clinical Audiologist  [place] #[id number]

## 2022-02-07 NOTE — PROGRESS NOTES
AUDIOLOGY REPORT    SUBJECTIVE: Leeann Mckeon is a 66 year old female who was seen in the Audiology Clinic at the [facility] for audiologic evaluation and hearing aid consultation, referred by, herself. The patient has been seen previously in this clinic on 2/22/2016 for assessment and results indicated normal sloping to moderate sensorineural hearing loss in the right ear and normal sloping to severe asymmetric high-frequency sensorineural hearing loss in the left ear. She had an MRI which was unremarkable. She was fit with bilateral A[place]o  hearing aids on 3/23/2016. Today, Leeann reports that the left ear has always been worse and she had an MRI which was unremarkable. Since her last hearing test, Leeann reports that her hearing has decreased and the right ear itches constantly. She reports she had vertigo from a sinus infection.  Denies otalgia, otorrhea, tinnitus, dizziness, fullness, ear surgery, and noise exposure. She states that she has trouble hearing in noise and difficulty concentrating on speech while wearing her hearing aids. She is interested in possibly pursuing new hearing aids.    OBJECTIVE:  Abuse Screening:  Do you feel unsafe at home or work/school? No  Do you feel threatened by someone? No  Does anyone try to keep you from having contact with others, or doing things outside of your home? No  Physical signs of abuse present? No     Fall Risk Screen:  1. Have you fallen two or more times in the past year? No  2. Have you fallen and had an injury in the past year? No    Timed Up and Go Score (in seconds): not tested  Referral initiated: No  Fall Risk Assessment Completed by Audiology    Otoscopic exam indicates ears are clear of cerumen bilaterally     Pure Tone Thresholds assessed using conventional audiometry with good reliability from 250-8000 Hz bilaterally using insert earphones and circumaural headphones.     RIGHT:  Normal sloping to moderate sensorineural hearing  loss     LEFT:    Normal sloping to moderately-severe sensorineural hearing loss     Tympanogram:    RIGHT: normal eardrum mobility    LEFT:   normal eardrum mobility    Reflexes (reported by stimulus ear):  RIGHT: Ipsilateral is present at normal levels  RIGHT: Contralateral is present at normal levels  LEFT:   Ipsilateral is present at normal levels  LEFT:   Contralateral is present at normal levels    Speech Reception Threshold:    RIGHT: 40 dB HL    LEFT:   40 dB HL    Word Recognition Score:     RIGHT: 88% at 80 dB HL using NU-6 recorded word list.    LEFT:   84% at 80 dB HL using NU-6 recorded word list.    Due to the improvement in Leeann's left ear, I offered to make adjustments to her current hearing aids or we could discuss new hearing aids. Leeann chose to have her current hearing aids adjusted. Leeann's audiogram was updated in the software and the hearing aid algorithm was recalculated to accommodate her current hearing sensitivity. Leeann's hearing aids were connected to the software and showed 9.1 hours of use in the right hearing aid and 8.9 hours of use in the left hearing aid. The gain for soft level sounds from 250-1500 Hz was decreased 3 large clicks and gain for moderate level sounds from 250-8000 Hz was increased 5 clicks. Leeann quickly stated that she could hear my voice much more clearly than before. TK-30 gain was decreased 3 large clicks. In the Speech in Noise automatic program, SoundRelax and NoiseBlock were strengthened from 8 to 18. SoundRecover remains active. Leeann stated that she was unaware that her hearing aids were able to be adjusted. Leeann inquired about rechargeable batteries and was informed that they are no longer available as the battery company is no longer in business. She was given a few extra small, open domes to take home.    Leeann mentioned that she does not know what her hearing aid coverage is, if there is coverage at all. Therefore, Leeann will wear these hearing aids with these  adjustments and is welcome to return for additional adjustments or return for a hearing aid consultation if she is not noticing benefit from the changes made today.     ASSESSMENT: Today's results reveal normal sloping to moderate sensorineural hearing loss in the right ear and normal sloping to moderately-severe sensorineural hearing loss in the left ear. Compared to their previous audiogram dated 2/22/2016, hearing is stable in the right ear and hearing in the left ear improved 15 dB at 5466-9608 Hz. Today s results were discussed with the patient in detail. Bilateral hearing aid check completed. Hearing aid consultation not completed today. Patient is given a hearing aid information folder to take home.    PLAN: It is recommended that Leeann return as needed for hearing aid adjustments. She will schedule a hearing aid consultation if she is still having difficulty understanding speech in background noise with her hearing aids. Please call this clinic with questions regarding these results or recommendations.      Margot Crzu, CCC-A  Clinical Audiologist  [place] #[id number]

## 2022-02-08 ENCOUNTER — OFFICE VISIT (OUTPATIENT)
Dept: AUDIOLOGY | Facility: CLINIC | Age: 67
End: 2022-02-08
Payer: COMMERCIAL

## 2022-02-08 DIAGNOSIS — H90.3 SENSORINEURAL HEARING LOSS (SNHL), BILATERAL: Primary | ICD-10-CM

## 2022-02-08 PROCEDURE — 92557 COMPREHENSIVE HEARING TEST: CPT | Performed by: AUDIOLOGIST

## 2022-02-08 PROCEDURE — 92550 TYMPANOMETRY & REFLEX THRESH: CPT | Performed by: AUDIOLOGIST

## 2022-02-08 PROCEDURE — 92593 PR HEARING AID CHECK, BINAURAL: CPT | Performed by: AUDIOLOGIST

## 2022-02-08 PROCEDURE — 99207 PR DROP WITH A PROCEDURE: CPT | Performed by: AUDIOLOGIST

## 2022-03-06 ENCOUNTER — HEALTH MAINTENANCE LETTER (OUTPATIENT)
Age: 67
End: 2022-03-06

## 2022-03-18 ENCOUNTER — NURSE TRIAGE (OUTPATIENT)
Dept: NURSING | Facility: CLINIC | Age: 67
End: 2022-03-18
Payer: COMMERCIAL

## 2022-03-18 ENCOUNTER — E-VISIT (OUTPATIENT)
Dept: INTERNAL MEDICINE | Facility: CLINIC | Age: 67
End: 2022-03-18
Payer: COMMERCIAL

## 2022-03-18 DIAGNOSIS — J06.9 VIRAL UPPER RESPIRATORY TRACT INFECTION: Primary | ICD-10-CM

## 2022-03-18 PROCEDURE — 99421 OL DIG E/M SVC 5-10 MIN: CPT | Performed by: INTERNAL MEDICINE

## 2022-03-18 RX ORDER — FLUTICASONE PROPIONATE 50 MCG
1 SPRAY, SUSPENSION (ML) NASAL DAILY
Qty: 16 ML | Refills: 3 | Status: SHIPPED | OUTPATIENT
Start: 2022-03-18

## 2022-03-18 RX ORDER — CODEINE PHOSPHATE AND GUAIFENESIN 10; 100 MG/5ML; MG/5ML
SOLUTION ORAL
Qty: 236 ML | Refills: 0 | Status: SHIPPED | OUTPATIENT
Start: 2022-03-18 | End: 2023-10-26

## 2022-03-18 NOTE — TELEPHONE ENCOUNTER
Provider E-Visit time total (minutes): 10    Leeann is a 66 year old female  with history of increased BMI, high blood pressure, osteopenia, osteoarthritis in her knees, acid reflux, colon polyps, cervical polyp, decreased hearing/aids and vitamin D deficiency.    Per history she developed sore throat and cough 6 days ago followed by runny nose.  She also had sinus pressure and ear pressure but it currently has improved.  She denies any fevers.  Currently she has persistent dry cough which wakes her up at night and she does a lot of hacking.  She tried Mucinex.    Most likely viral infection.  We will screen for Covid.  Since sinus pressure has improved I doubt she needs antibiotic.  Would recommend Flonase for postnasal drainage and cough syrup at bedtime.  If that does not improve her symptoms she will let me know through MyChart and we can treat with a Medrol Dosepak.

## 2022-03-18 NOTE — TELEPHONE ENCOUNTER
Patient calling reporting she has had a cough for the past week that isn't going away. Reports she gets a similar cough annually. Patient is on lisinopril and notices the cough seems to be worse at night where she is nonstop coughing and unable to sleep. Denies fever, coughing up blood and wheezing. Advised per protocol to schedule a virtual or in person visit today with patient agreeable to the plan.     Priscila Patel RN 03/18/22 11:32 AM    Health Triage Nurse Advisor    Reason for Disposition    Continuous (nonstop) coughing interferes with work or school and no improvement using cough treatment per Care Advice    Additional Information    Negative: Bluish (or gray) lips or face    Negative: Severe difficulty breathing (e.g., struggling for each breath, speaks in single words)    Negative: Rapid onset of cough and has hives    Negative: Coughing started suddenly after medicine, an allergic food or bee sting    Negative: Difficulty breathing after exposure to flames, smoke, or fumes    Negative: Sounds like a life-threatening emergency to the triager    Negative: Previous asthma attacks and this feels like asthma attack    Negative: Chest pain present when not coughing    Negative: Difficulty breathing    Negative: Passed out (i.e., fainted, collapsed and was not responding)    Negative: Patient sounds very sick or weak to the triager    Negative: Coughed up > 1 tablespoon (15 ml) blood (Exception: blood-tinged sputum)    Negative: Fever > 103 F (39.4 C)    Negative: Fever > 101 F (38.3 C) and over 60 years of age    Negative: Fever > 100.0 F (37.8 C) and has diabetes mellitus or a weak immune system (e.g., HIV positive, cancer chemotherapy, organ transplant, splenectomy, chronic steroids)    Negative: Fever > 100.0 F (37.8 C) and bedridden (e.g., nursing home patient, stroke, chronic illness, recovering from surgery)    Negative: Increasing ankle swelling    Negative: Wheezing is present    Negative: SEVERE  coughing spells (e.g., whooping sound after coughing, vomiting after coughing)    Negative: Coughing up eral-colored (reddish-brown) or blood-tinged sputum    Negative: Using nasal washes and pain medicine > 24 hours and sinus pain persists    Negative: Fever returns after gone for over 24 hours and symptoms worse or not improved    Negative: Fever present > 3 days (72 hours)    Negative: Known COPD or other severe lung disease (i.e., bronchiectasis, cystic fibrosis, lung surgery) and worsening symptoms (i.e., increased sputum purulence or amount, increased breathing difficulty)    Protocols used: COUGH-A-OH

## 2022-03-23 ENCOUNTER — ANCILLARY PROCEDURE (OUTPATIENT)
Dept: MAMMOGRAPHY | Facility: CLINIC | Age: 67
End: 2022-03-23
Attending: INTERNAL MEDICINE
Payer: COMMERCIAL

## 2022-03-23 DIAGNOSIS — Z12.31 ENCOUNTER FOR SCREENING MAMMOGRAM FOR BREAST CANCER: ICD-10-CM

## 2022-03-23 PROCEDURE — 77067 SCR MAMMO BI INCL CAD: CPT

## 2022-04-25 ENCOUNTER — OFFICE VISIT (OUTPATIENT)
Dept: FAMILY MEDICINE | Facility: CLINIC | Age: 67
End: 2022-04-25
Payer: COMMERCIAL

## 2022-04-25 ENCOUNTER — NURSE TRIAGE (OUTPATIENT)
Dept: NURSING | Facility: CLINIC | Age: 67
End: 2022-04-25
Payer: COMMERCIAL

## 2022-04-25 VITALS
HEART RATE: 66 BPM | SYSTOLIC BLOOD PRESSURE: 135 MMHG | RESPIRATION RATE: 24 BRPM | DIASTOLIC BLOOD PRESSURE: 77 MMHG | OXYGEN SATURATION: 98 % | TEMPERATURE: 97.8 F

## 2022-04-25 DIAGNOSIS — J20.9 ACUTE BRONCHITIS, UNSPECIFIED ORGANISM: Primary | ICD-10-CM

## 2022-04-25 DIAGNOSIS — R06.02 SHORTNESS OF BREATH: ICD-10-CM

## 2022-04-25 PROCEDURE — 99214 OFFICE O/P EST MOD 30 MIN: CPT | Performed by: NURSE PRACTITIONER

## 2022-04-25 RX ORDER — PREDNISONE 20 MG/1
40 TABLET ORAL DAILY
Qty: 10 TABLET | Refills: 0 | Status: SHIPPED | OUTPATIENT
Start: 2022-04-25 | End: 2022-04-30

## 2022-04-25 RX ORDER — ALBUTEROL SULFATE 90 UG/1
1-2 AEROSOL, METERED RESPIRATORY (INHALATION) EVERY 4 HOURS PRN
Qty: 6.7 G | Refills: 0 | Status: SHIPPED | OUTPATIENT
Start: 2022-04-25 | End: 2023-10-26

## 2022-04-25 ASSESSMENT — ENCOUNTER SYMPTOMS
DYSURIA: 0
WHEEZING: 1
FEVER: 0
RHINORRHEA: 1
NAUSEA: 0
CHILLS: 0
HEADACHES: 0
SORE THROAT: 0
MYALGIAS: 0
SHORTNESS OF BREATH: 1
FATIGUE: 0
COUGH: 1
DIARRHEA: 0
ACTIVITY CHANGE: 0
APPETITE CHANGE: 0
VOMITING: 0

## 2022-04-25 NOTE — PROGRESS NOTES
Patient presents with:  Breathing Problem: Cough, wheezing, sob, runny nose x 5 days     Clinical Decision Making: Focused exam with scattered wheezes throughout posterior lung sounds, persistent,, mild nasal congestion with rhinorrhea.  Chest x-ray negative for any acute findings or pneumonia.    Clinical presentation consistent with acute bronchitis we will treat with an albuterol inhaler and a short course of prednisone burst.  Encourage patient to increase water hydration, rest, and continue Mucinex.  Discussed red flag symptoms for respiratory illness and when to return for reevaluation. Education provided.      ICD-10-CM    1. Acute bronchitis, unspecified organism  J20.9 predniSONE (DELTASONE) 20 MG tablet   2. Shortness of breath  R06.02 XR Chest 2 Views     XR Chest 2 Views     albuterol (PROAIR HFA/PROVENTIL HFA/VENTOLIN HFA) 108 (90 Base) MCG/ACT inhaler       There are no Patient Instructions on file for this visit.    HPI: Leeann Mckeon is a 66 year old female with hypertension, chronic kidney disease stage III who presents today complaining of cough, shortness of breath, wheezing, nasal congestion with rhinorrhea for the past 5 days. Patient is fully vaccinated and boosted against COVID with no known COVID exposures, with a negative home COVID test.  She does endorse carrying for and being in close proximity with young grandchildren that have had some sick/cold symptoms. Patient reports taking Mucinex OTC medications with limited relief.    History obtained from the patient.    Problem List:  2021-10: Chronic kidney disease, stage 3  2020-12: BMI 40.0-44.9, adult (H)  2016-11: Hearing loss  2015-11: Adenomatous colon polyp  Obesity  Hypertension  Osteopenia  Arthritis  Esophageal Reflux  Herpes Zoster (Shingles)      Past Medical History:   Diagnosis Date     Acid reflux      Arthritis     knee     Cholelithiasis      Essential hypertension        Social History     Tobacco Use     Smoking status:  Never Smoker     Smokeless tobacco: Never Used   Substance Use Topics     Alcohol use: Yes     Comment: Alcoholic Drinks/day: socially       Review of Systems   Constitutional: Negative for activity change, appetite change, chills, fatigue and fever.   HENT: Positive for congestion and rhinorrhea. Negative for ear pain and sore throat.    Respiratory: Positive for cough, shortness of breath and wheezing.    Gastrointestinal: Negative for diarrhea, nausea and vomiting.   Genitourinary: Negative for dysuria.   Musculoskeletal: Negative for myalgias.   Neurological: Negative for headaches.       Vitals:    04/25/22 1311   BP: 135/77   Pulse: 66   Resp: 24   Temp: 97.8  F (36.6  C)   TempSrc: Tympanic   SpO2: 98%       Physical Exam  Constitutional:       Appearance: Normal appearance. She is not ill-appearing or diaphoretic.   HENT:      Head: Normocephalic and atraumatic.      Right Ear: Tympanic membrane, ear canal and external ear normal.      Left Ear: Tympanic membrane, ear canal and external ear normal.      Nose: Congestion and rhinorrhea present.      Mouth/Throat:      Mouth: Mucous membranes are moist.      Pharynx: No oropharyngeal exudate or posterior oropharyngeal erythema.   Eyes:      General: No scleral icterus.        Right eye: No discharge.         Left eye: No discharge.      Extraocular Movements: Extraocular movements intact.      Conjunctiva/sclera: Conjunctivae normal.      Pupils: Pupils are equal, round, and reactive to light.   Cardiovascular:      Rate and Rhythm: Normal rate and regular rhythm.      Pulses: Normal pulses.      Heart sounds: Normal heart sounds.   Pulmonary:      Effort: Pulmonary effort is normal.      Breath sounds: Wheezing present.      Comments: Persistent cough throughout exam  Musculoskeletal:      Cervical back: Neck supple.   Lymphadenopathy:      Cervical: No cervical adenopathy.   Skin:     General: Skin is warm.      Capillary Refill: Capillary refill takes less  than 2 seconds.      Findings: No rash.   Neurological:      Mental Status: She is alert and oriented to person, place, and time.   Psychiatric:         Behavior: Behavior normal.         Labs:  Results for orders placed or performed in visit on 04/25/22   XR Chest 2 Views     Status: None    Narrative    EXAM DATE:         04/25/2022    EXAM: X-RAY CHEST, 2 VIEWS, FRONTAL AND LATERAL  LOCATION: [facility]  DATE/TIME: 4/25/2022 2:15 PM    INDICATION: Persistent cough with progressive shortness of breath and wheezing over the past 5 to 7 days.  COMPARISON: None.    IMPRESSION: Negative chest. Lungs are clear. No consolidation. No pleural effusion or pneumothorax. Normal heart size.                   At the end of the encounter, I discussed results, diagnosis, medications. Discussed red flags for immediate return to clinic/ER, as well as indications for follow up if no improvement. Patient understood and agreed to plan.     ANTONIO Tejeda CNP

## 2022-04-25 NOTE — TELEPHONE ENCOUNTER
"Pt states \"felt like when I had bronchitis in 2020.\"  Onset of current episode five days ago.  - cough  - nasal congestion  - wheezing  - tightness between shoulder blades with coughing    No fevers or body aches.  Used a home covid antigen test -> negative.    Has tried OTC Mucinex and Flonase.  No improvement.  \"Some shortness of breath with exertion.\"  Pt currently exhibits tight cough.  States \"My  said I was wheezing overnight.\"    Pt agrees to same-day clinical eval.  [facility].  Intends to go now.    Kaitlin LOVELL Health Nurse Advisor     Reason for Disposition    Wheezing is present    Additional Information    Negative: Bluish (or gray) lips or face    Negative: Severe difficulty breathing (e.g., struggling for each breath, speaks in single words)    Negative: Rapid onset of cough and has hives    Negative: Coughing started suddenly after medicine, an allergic food or bee sting    Negative: Difficulty breathing after exposure to flames, smoke, or fumes    Negative: Sounds like a life-threatening emergency to the triager    Negative: Previous asthma attacks and this feels like asthma attack    Negative: Chest pain present when not coughing    Negative: Difficulty breathing    Negative: Passed out (i.e., fainted, collapsed and was not responding)    Negative: Patient sounds very sick or weak to the triager    Negative: Coughed up > 1 tablespoon (15 ml) blood (Exception: blood-tinged sputum)    Negative: Fever > 103 F (39.4 C)    Negative: Fever > 101 F (38.3 C) and over 60 years of age    Negative: Fever > 100.0 F (37.8 C) and has diabetes mellitus or a weak immune system (e.g., HIV positive, cancer chemotherapy, organ transplant, splenectomy, chronic steroids)    Negative: Fever > 100.0 F (37.8 C) and bedridden (e.g., nursing home patient, stroke, chronic illness, recovering from surgery)    Negative: Increasing ankle swelling    Protocols used: " "COUGH-A-OH    ____________________      COVID 19 Nurse Triage Plan/Patient Instructions    Please be aware that novel coronavirus (COVID-19) may be circulating in the community. If you develop symptoms such as fever, cough, or SOB or if you have concerns about the presence of another infection including coronavirus (COVID-19), please contact your health care provider or visit [email or web address].     Disposition/Instructions    Additional COVID19 information to add for patients.   How can I protect others?  If you have symptoms (fever, cough, body aches or trouble breathing): Stay home and away from others (self-isolate) until:    At least 10 days have passed since your symptoms started, And     You ve had no fever--and no medicine that reduces fever--for 1 full day (24 hours), And      Your other symptoms have resolved (gotten better).     If you don t have symptoms, but a test showed that you have COVID-19 (you tested positive):    Stay home and away from others (self-isolate). Follow the tips under \"How do I self-isolate?\" below for 10 days (20 days if you have a weak immune system).    You don't need to be retested for COVID-19 before going back to school or work. As long as you're fever-free and feeling better, you can go back to school, work and other activities after waiting the 10 or 20 days.     How do I self-isolate?    Stay in your own room, even for meals. Use your own bathroom if you can.     Stay away from others in your home. No hugging, kissing or shaking hands. No visitors.    Don t go to work, school or anywhere else.     Clean  high touch  surfaces often (doorknobs, counters, handles, etc.). Use a household cleaning spray or wipes. You ll find a full list on the EPA website:  www.epa.gov/pesticide-registration/list-n-disinfectants-use-against-sars-cov-2.    Cover your mouth and nose with a mask, tissue or washcloth to avoid spreading germs.    Wash your hands and face often. Use soap and " water.    Caregivers in these groups are at risk for severe illness due to COVID-19:  o People 65 years and older  o People who live in a nursing home or long-term care facility  o People with chronic disease (lung, heart, cancer, diabetes, kidney, liver, immunologic)  o People who have a weakened immune system, including those who:  - Are in cancer treatment  - Take medicine that weakens the immune system, such as corticosteroids  - Had a bone marrow or organ transplant  - Have an immune deficiency  - Have poorly controlled HIV or AIDS  - Are obese (body mass index of 40 or higher)  - Smoke regularly    Caregivers should wear gloves while washing dishes, handling laundry and cleaning bedrooms and bathrooms.    Use caution when washing and drying laundry: Don t shake dirty laundry, and use the warmest water setting that you can.    For more tips, go to www.cdc.gov/coronavirus/2019-ncov/downloads/10Things.pdf.    How can I take care of myself?  1. Get lots of rest. Drink extra fluids (unless a doctor has told you not to).     2. Take Tylenol (acetaminophen) for fever or pain. If you have liver or kidney problems, ask your family doctor if it s okay to take Tylenol.     Adults can take either:     650 mg (two 325 mg pills) every 4 to 6 hours, or     1,000 mg (two 500 mg pills) every 8 hours as needed.     Note: Don t take more than 3,000 mg in one day.   Acetaminophen is found in many medicines (both prescribed and over-the-counter medicines). Read all labels to be sure you don t take too much.     For children, check the Tylenol bottle for the right dose. The dose is based on the child s age or weight.    3. If you have other health problems (like cancer, heart failure, an organ transplant or severe kidney disease): Call your specialty clinic if you don t feel better in the next 2 days.    4. Know when to call 911: Emergency warning signs include:    Trouble breathing or shortness of breath    Pain or pressure in the  chest that doesn t go away    Feeling confused like you haven t felt before, or not being able to wake up    Bluish-colored lips or face    What are the symptoms of COVID-19?     The most common symptoms are cough, fever and trouble breathing.     Less common symptoms include body aches, chills, diarrhea (loose, watery poops), fatigue (feeling very tired), headache, runny nose, sore throat and loss of smell.    COVID-19 can cause severe coughing (bronchitis) and lung infection (pneumonia).    How does it spread?     The virus may spread when a person coughs or sneezes into the air. The virus can travel about 6 feet this way, and it can live on surfaces.      Common  (household disinfectants) will kill the virus.    Who is at risk?  Anyone can catch COVID-19 if they re around someone who has the virus.    How can others protect themselves?     Stay away from people who have COVID-19 (or symptoms of COVID-19).    Wash hands often with soap and water. Or, use hand  with at least 60% alcohol.    Avoid touching the eyes, nose or mouth.     Wear a face mask when you go out in public, when sick or when caring for a sick person.    Where can I get more information?    [facility]: About COVID-19: [email or web address]    CDC: What to Do If You re Sick: www.cdc.gov/coronavirus/2019-ncov/about/steps-when-sick.html    CDC: Ending Home Isolation: www.cdc.gov/coronavirus/2019-ncov/hcp/disposition-in-home-patients.html     CDC: Caring for Someone: www.cdc.gov/coronavirus/2019-ncov/if-you-are-sick/care-for-someone.html     [facility]: Interim Guidance for Hospital Discharge to Home: www.health.[facility].[place]/diseases/coronavirus/hcp/hospdischarge.pdf    [facility] clinical trials (COVID-19 research studies): clinicalaffairs.[facility]/umn-clinical-trials     Below are the COVID-19 hotlines at the [place] Department of Health ([facility]). Interpreters are available.   o For health questions: Call  331.118.6585 or 1-865.811.5810 (7 a.m. to 7 p.m.)  o For questions about schools and childcare: Call 022-124-8361 or 1-909.212.1320 (7 a.m. to 7 p.m.)          Thank you for taking steps to prevent the spread of this virus.  o Limit your contact with others.  o Wear a simple mask to cover your cough.  o Wash your hands well and often.    Resources    [facility]: About COVID-19: [email or web address]    CDC: What to Do If You're Sick: www.cdc.gov/coronavirus/2019-ncov/about/steps-when-sick.html    CDC: Ending Home Isolation: www.cdc.gov/coronavirus/2019-ncov/hcp/disposition-in-home-patients.html     CDC: Caring for Someone: www.cdc.gov/coronavirus/2019-ncov/if-you-are-sick/care-for-someone.html     [facility]: Interim Guidance for Hospital Discharge to Home: www.[facility].[place]./diseases/coronavirus/hcp/hospdischarge.pdf    [facility] clinical trials (COVID-19 research studies): clinicalaffairs.[facility]-clinical-trials     Below are the COVID-19 hotlines at the [place] Department of Health ([facility]). Interpreters are available.   o For health questions: Call 494-545-6369 or 1-900.454.5382 (7 a.m. to 7 p.m.)  o For questions about schools and childcare: Call 417-816-1747 or 1-949.402.6691 (7 a.m. to 7 p.m.)

## 2022-04-27 ENCOUNTER — NURSE TRIAGE (OUTPATIENT)
Dept: NURSING | Facility: CLINIC | Age: 67
End: 2022-04-27
Payer: COMMERCIAL

## 2022-04-27 NOTE — TELEPHONE ENCOUNTER
Tested on Monday and prescribed medication and having side effects. Prednisone prescribed, take 40 mg by mouth daily for five days. Started it yesterday and pharmacist suggested to take it in the morning. Has back pain and bad headache which started yesterday then it persisted all day and night and again today. Wants to know if dose can be adjusted? Saw Jose Olivas CNP on 4-25-22 at [facility].  Please call patient at:  [phone].  May leave a detailed message.  Thank you,  Candis Atwood RN  [facility]    Nurse Triage SBAR    Is this a 2nd Level Triage? YES, LICENSED PRACTITIONER REVIEW IS REQUIRED    Situation:   Tested on Monday and prescribed medication and having side effects. Prednisone prescribed, take 40 mg by mouth daily for five days. Started it yesterday and pharmacist suggested to take it in the morning. Has back pain and bad headache which started yesterday then it persisted all day and night and again today. Wants to know if dose can be adjusted?     Background:    Saw Jose Olivas CNP on 4-25-22 at [facility].  Please call patient at:  [phone].  May leave a detailed message. Prednisone prescribed, take 40 mg by mouth daily for five days. Started it yesterday.      Assessment:   side effects of prednisone, wants adjustment on dose.    Protocol Recommended Disposition:   Call Back By PCP Today    Recommendation:   call back by primary about whether or not to adjust prednisone dose.     Routed to provider    Does the patient meet one of the following criteria for ADS visit consideration? No     Candis Atwood RN  [facility]    Reason for Disposition    Caller requesting a NON-URGENT new prescription or refill and triager unable to refill per department policy    Additional Information    Negative: Drug overdose and triager unable to answer question    Negative: Caller requesting information unrelated to medicine    Negative: Caller  requesting a prescription for Strep throat and has a positive culture result    Negative: Rash while taking a medication or within 3 days of stopping it    Negative: Immunization reaction suspected    Negative: Asthma and having symptoms of asthma (cough, wheezing, etc.)    Negative: Breastfeeding questions about mother's medicines and diet    Negative: MORE THAN A DOUBLE DOSE of a prescription or over-the-counter (OTC) drug    Negative: DOUBLE DOSE (an extra dose or lesser amount) of over-the-counter (OTC) drug and any symptoms (e.g., dizziness, nausea, pain, sleepiness)    Negative: DOUBLE DOSE (an extra dose or lesser amount) of prescription drug and any symptoms (e.g., dizziness, nausea, pain, sleepiness)    Negative: Took another person's prescription drug    Negative: DOUBLE DOSE (an extra dose or lesser amount) of prescription drug and NO symptoms (Exception: a double dose of antibiotics)    Negative: Diabetes drug error or overdose (e.g., took wrong type of insulin or took extra dose)    Negative: Caller has medication question about med not prescribed by PCP and triager unable to answer question (e.g., compatibility with other med, storage)    Negative: Request for URGENT new prescription or refill of 'essential' medication (i.e., likelihood of harm to patient if not taken) and triager unable to fill per department policy    Negative: Prescription not at pharmacy and was prescribed today by PCP    Negative: Pharmacy calling with prescription questions and triager unable to answer question    Negative: Caller has urgent medication question about med that PCP prescribed and triager unable to answer question    Negative: Caller has NON-URGENT medication question about med that PCP prescribed and triager unable to answer question    Protocols used: MEDICATION QUESTION CALL-A-OH

## 2022-04-27 NOTE — TELEPHONE ENCOUNTER
Spoke with patient who states she did an at home covid test before going to urgent care, which was negative. Relayed message below from Dr Lambert. Patient states her breathing is getting better and she will try 20 mg prednisone.    Gallito Wharton RN  [facility]

## 2022-04-27 NOTE — TELEPHONE ENCOUNTER
Provider Response to 2nd Level Triage Request    Did Urgent care do a Covid screen? There is nothing in the computer...    Headache can be oe of the symptoms or covid or potetially side effect from prednisone.    If no Covid screen, should have one done ( I put order in previously,please help her set up).    If breathing is better, O'K to decrease Prednisone dose to 20 mg a day and take it for 7 days.     I can also do VV with her tomorrow.

## 2022-05-04 ENCOUNTER — MYC MEDICAL ADVICE (OUTPATIENT)
Dept: INTERNAL MEDICINE | Facility: CLINIC | Age: 67
End: 2022-05-04
Payer: COMMERCIAL

## 2022-05-04 DIAGNOSIS — M17.10 ARTHRITIS OF KNEE: Primary | ICD-10-CM

## 2022-05-05 RX ORDER — MELOXICAM 7.5 MG/1
7.5 TABLET ORAL DAILY
Qty: 30 TABLET | Refills: 0 | Status: SHIPPED | OUTPATIENT
Start: 2022-05-05

## 2022-08-24 DIAGNOSIS — K21.9 GASTROESOPHAGEAL REFLUX DISEASE WITHOUT ESOPHAGITIS: ICD-10-CM

## 2022-08-25 NOTE — TELEPHONE ENCOUNTER
"Last Written Prescription Date:  10/20/21  Last Fill Quantity: 90,  # refills: 2   Last office visit provider:  4/25/22     Requested Prescriptions   Pending Prescriptions Disp Refills     omeprazole (PRILOSEC) 20 MG DR capsule [Pharmacy Med Name: OMEPRAZOLE 20 MG CPDR 20 Capsule] 90 capsule 2     Sig: TAKE 1 CAPSULE (20 MG) BY MOUTH DAILY       PPI Protocol Passed - 8/24/2022 10:17 AM        Passed - Not on Clopidogrel (unless Pantoprazole ordered)        Passed - No diagnosis of osteoporosis on record        Passed - Recent (12 mo) or future (30 days) visit within the authorizing provider's specialty     Patient has had an office visit with the authorizing provider or a provider within the authorizing providers department within the previous 12 mos or has a future within next 30 days. See \"Patient Info\" tab in inbasket, or \"Choose Columns\" in Meds & Orders section of the refill encounter.              Passed - Medication is active on med list        Passed - Patient is age 18 or older        Passed - No active pregnacy on record        Passed - No positive pregnancy test in past 12 months             Evi Mosley RN 08/25/22 4:16 PM  "

## 2022-10-12 ASSESSMENT — ENCOUNTER SYMPTOMS
DYSURIA: 0
DIARRHEA: 0
CHILLS: 0
ABDOMINAL PAIN: 0
PALPITATIONS: 0
JOINT SWELLING: 1
CONSTIPATION: 0
HEMATURIA: 0
NERVOUS/ANXIOUS: 0
HEMATOCHEZIA: 0
SORE THROAT: 0
BREAST MASS: 0
SHORTNESS OF BREATH: 0
DIZZINESS: 0
HEARTBURN: 0
MYALGIAS: 0
NAUSEA: 0
COUGH: 1
FREQUENCY: 0
EYE PAIN: 0
HEADACHES: 0
ARTHRALGIAS: 1
PARESTHESIAS: 0
FEVER: 0
WEAKNESS: 0

## 2022-10-12 ASSESSMENT — ACTIVITIES OF DAILY LIVING (ADL)
CURRENT_FUNCTION: HOUSEWORK REQUIRES ASSISTANCE
CURRENT_FUNCTION: LAUNDRY REQUIRES ASSISTANCE

## 2022-10-19 ENCOUNTER — OFFICE VISIT (OUTPATIENT)
Dept: INTERNAL MEDICINE | Facility: CLINIC | Age: 67
End: 2022-10-19
Payer: COMMERCIAL

## 2022-10-19 VITALS
HEART RATE: 72 BPM | SYSTOLIC BLOOD PRESSURE: 116 MMHG | TEMPERATURE: 98.3 F | OXYGEN SATURATION: 95 % | WEIGHT: 232.2 LBS | RESPIRATION RATE: 15 BRPM | BODY MASS INDEX: 45.59 KG/M2 | HEIGHT: 60 IN | DIASTOLIC BLOOD PRESSURE: 61 MMHG

## 2022-10-19 DIAGNOSIS — I10 PRIMARY HYPERTENSION: ICD-10-CM

## 2022-10-19 DIAGNOSIS — N18.31 STAGE 3A CHRONIC KIDNEY DISEASE (H): ICD-10-CM

## 2022-10-19 DIAGNOSIS — J45.20 MILD INTERMITTENT ASTHMA WITHOUT COMPLICATION: ICD-10-CM

## 2022-10-19 DIAGNOSIS — Z23 HIGH PRIORITY FOR 2019-NCOV VACCINE: ICD-10-CM

## 2022-10-19 DIAGNOSIS — E78.5 HYPERLIPIDEMIA LDL GOAL <130: ICD-10-CM

## 2022-10-19 DIAGNOSIS — Z78.0 POST-MENOPAUSAL: ICD-10-CM

## 2022-10-19 DIAGNOSIS — Z00.00 ROUTINE HISTORY AND PHYSICAL EXAMINATION OF ADULT: Primary | ICD-10-CM

## 2022-10-19 DIAGNOSIS — R06.83 SNORING: ICD-10-CM

## 2022-10-19 DIAGNOSIS — Z12.31 ENCOUNTER FOR SCREENING MAMMOGRAM FOR BREAST CANCER: ICD-10-CM

## 2022-10-19 DIAGNOSIS — E55.9 VITAMIN D DEFICIENCY: ICD-10-CM

## 2022-10-19 DIAGNOSIS — Z23 NEED FOR VACCINATION: ICD-10-CM

## 2022-10-19 LAB
ALBUMIN SERPL BCG-MCNC: 4.3 G/DL (ref 3.5–5.2)
ALP SERPL-CCNC: 87 U/L (ref 35–104)
ALT SERPL W P-5'-P-CCNC: 8 U/L (ref 10–35)
ANION GAP SERPL CALCULATED.3IONS-SCNC: 13 MMOL/L (ref 7–15)
AST SERPL W P-5'-P-CCNC: 18 U/L (ref 10–35)
BASOPHILS # BLD AUTO: 0 10E3/UL (ref 0–0.2)
BASOPHILS NFR BLD AUTO: 0 %
BILIRUB SERPL-MCNC: 0.4 MG/DL
BUN SERPL-MCNC: 34.9 MG/DL (ref 8–23)
CALCIUM SERPL-MCNC: 10.2 MG/DL (ref 8.8–10.2)
CHLORIDE SERPL-SCNC: 108 MMOL/L (ref 98–107)
CHOLEST SERPL-MCNC: 226 MG/DL
CREAT SERPL-MCNC: 1.27 MG/DL (ref 0.51–0.95)
CREAT UR-MCNC: 220 MG/DL
DEPRECATED CALCIDIOL+CALCIFEROL SERPL-MC: 38 UG/L (ref 20–75)
DEPRECATED HCO3 PLAS-SCNC: 22 MMOL/L (ref 22–29)
EOSINOPHIL # BLD AUTO: 0.2 10E3/UL (ref 0–0.7)
EOSINOPHIL NFR BLD AUTO: 3 %
ERYTHROCYTE [DISTWIDTH] IN BLOOD BY AUTOMATED COUNT: 12.8 % (ref 10–15)
GFR SERPL CREATININE-BSD FRML MDRD: 47 ML/MIN/1.73M2
GLUCOSE SERPL-MCNC: 93 MG/DL (ref 70–99)
HCT VFR BLD AUTO: 36 % (ref 35–47)
HDLC SERPL-MCNC: 73 MG/DL
HGB BLD-MCNC: 11.8 G/DL (ref 11.7–15.7)
IMM GRANULOCYTES # BLD: 0 10E3/UL
IMM GRANULOCYTES NFR BLD: 0 %
LDLC SERPL CALC-MCNC: 133 MG/DL
LYMPHOCYTES # BLD AUTO: 2.2 10E3/UL (ref 0.8–5.3)
LYMPHOCYTES NFR BLD AUTO: 34 %
MCH RBC QN AUTO: 31.3 PG (ref 26.5–33)
MCHC RBC AUTO-ENTMCNC: 32.8 G/DL (ref 31.5–36.5)
MCV RBC AUTO: 96 FL (ref 78–100)
MICROALBUMIN UR-MCNC: 19.4 MG/L
MICROALBUMIN/CREAT UR: 8.82 MG/G CR (ref 0–25)
MONOCYTES # BLD AUTO: 0.4 10E3/UL (ref 0–1.3)
MONOCYTES NFR BLD AUTO: 6 %
NEUTROPHILS # BLD AUTO: 3.7 10E3/UL (ref 1.6–8.3)
NEUTROPHILS NFR BLD AUTO: 56 %
NONHDLC SERPL-MCNC: 153 MG/DL
PLATELET # BLD AUTO: 258 10E3/UL (ref 150–450)
POTASSIUM SERPL-SCNC: 3.7 MMOL/L (ref 3.4–5.3)
PROT SERPL-MCNC: 7.4 G/DL (ref 6.4–8.3)
RBC # BLD AUTO: 3.77 10E6/UL (ref 3.8–5.2)
SODIUM SERPL-SCNC: 143 MMOL/L (ref 136–145)
TRIGL SERPL-MCNC: 99 MG/DL
TSH SERPL DL<=0.005 MIU/L-ACNC: 0.92 UIU/ML (ref 0.3–4.2)
WBC # BLD AUTO: 6.5 10E3/UL (ref 4–11)

## 2022-10-19 PROCEDURE — G0008 ADMIN INFLUENZA VIRUS VAC: HCPCS | Performed by: INTERNAL MEDICINE

## 2022-10-19 PROCEDURE — 82306 VITAMIN D 25 HYDROXY: CPT | Performed by: INTERNAL MEDICINE

## 2022-10-19 PROCEDURE — 85025 COMPLETE CBC W/AUTO DIFF WBC: CPT | Performed by: INTERNAL MEDICINE

## 2022-10-19 PROCEDURE — 82043 UR ALBUMIN QUANTITATIVE: CPT | Performed by: INTERNAL MEDICINE

## 2022-10-19 PROCEDURE — 84443 ASSAY THYROID STIM HORMONE: CPT | Performed by: INTERNAL MEDICINE

## 2022-10-19 PROCEDURE — 80053 COMPREHEN METABOLIC PANEL: CPT | Performed by: INTERNAL MEDICINE

## 2022-10-19 PROCEDURE — 91313 COVID-19,PF,MODERNA BIVALENT: CPT | Performed by: INTERNAL MEDICINE

## 2022-10-19 PROCEDURE — 99214 OFFICE O/P EST MOD 30 MIN: CPT | Mod: 25 | Performed by: INTERNAL MEDICINE

## 2022-10-19 PROCEDURE — G0402 INITIAL PREVENTIVE EXAM: HCPCS | Performed by: INTERNAL MEDICINE

## 2022-10-19 PROCEDURE — 90662 IIV NO PRSV INCREASED AG IM: CPT | Performed by: INTERNAL MEDICINE

## 2022-10-19 PROCEDURE — 36415 COLL VENOUS BLD VENIPUNCTURE: CPT | Performed by: INTERNAL MEDICINE

## 2022-10-19 PROCEDURE — 80061 LIPID PANEL: CPT | Performed by: INTERNAL MEDICINE

## 2022-10-19 PROCEDURE — 0134A COVID-19,PF,MODERNA BIVALENT: CPT | Performed by: INTERNAL MEDICINE

## 2022-10-19 ASSESSMENT — ENCOUNTER SYMPTOMS
CHILLS: 0
DYSURIA: 0
HEARTBURN: 0
HEMATOCHEZIA: 0
NAUSEA: 0
SORE THROAT: 0
COUGH: 1
JOINT SWELLING: 1
FREQUENCY: 0
SHORTNESS OF BREATH: 0
PARESTHESIAS: 0
PALPITATIONS: 0
HEADACHES: 0
HEMATURIA: 0
EYE PAIN: 0
DIARRHEA: 0
BREAST MASS: 0
FEVER: 0
DIZZINESS: 0
WEAKNESS: 0
ABDOMINAL PAIN: 0
MYALGIAS: 0
CONSTIPATION: 0
ARTHRALGIAS: 1
NERVOUS/ANXIOUS: 0

## 2022-10-19 ASSESSMENT — ACTIVITIES OF DAILY LIVING (ADL)
CURRENT_FUNCTION: LAUNDRY REQUIRES ASSISTANCE
CURRENT_FUNCTION: HOUSEWORK REQUIRES ASSISTANCE

## 2022-10-19 NOTE — PATIENT INSTRUCTIONS
Labs today    2. Schedule bone density, mammogram     3. Get shingles vaccine at the pharmacy.    4. Will need pneumonia shot in the future (can do it in the pharmacy).    5. Covid and flu shot today.    6. See sleep clinic doctor for sleep apnea testing.    7. Start with weight watchers, let me know if would like to see nutritions for weight loss.   Cut out out juice.

## 2022-10-19 NOTE — PROGRESS NOTES
SUBJECTIVE:   Leeann is a 67 year old who presents for Preventive Visit.    Leeann is a 67 year old female  with history of increased BMI, high blood pressure, osteopenia, osteoarthritis in her knees, acid reflux, colon polyps, cervical polyp, decreased hearing/aids and vitamin D deficiency.  She is currently here for Medicare wellness exam.    No new concerns or complaints.  Blood pressure is well controlled on lisinopril hydrochlorothiazide.  Last year her kidney function was a little bit off but she was also taking a lot of ibuprofen and currently is off over it.  She does have knee arthritis and still painful for her to walk up and down the steps but she continues to do home exercises, stretches.  She also get electric scooter so she can take her grandkids to the park recently.  She also had mild anemia last year, most likely due to NSAID use.  She is up-to-date on colonoscopy.    Asthma is currently controlled.  She has had July COVID infection but currently has recovered from it.    Currently she is taking Crestor since last year due to significant increase in her cholesterol.  High cholesterol does run in her family.  She takes 1 pill every other day, previously when she took it every day it gave her some achiness.    She takes vitamin D and B12 supplements.    At nighttime she does have occasional snoring and restlessness.    Acid reflux is controlled with PPI.    She denies any vaginal spotting or bleeding.    She has family history of hearing loss and has been using hearing aids since age of 52.  Hearing has been stable.    She denies any recent falls but does have problems with his balance due to foot deformity and uses cane and occasionally a walker in the winter for balance.    She does see ophthalmology regularly, recent eye exam was in the summer and she got new glasses.    Since last year she did gain additional 6 pounds.  Her BMI currently is 45.  She did find out recurrent insurance she can get free  "weight watchers membership and plans to start it.    {Patient has been advised of split billing requirements and indicates understanding: Yes  Are you in the first 12 months of your Medicare coverage?  Yes,  Visual Acuity:  Right Eye: 10/16   Left Eye: 10/12.5  Both Eyes: 10/16    Healthy Habits:     In general, how would you rate your overall health?  Fair    Frequency of exercise:  4-5 days/week    Duration of exercise:  15-30 minutes    Do you usually eat at least 4 servings of fruit and vegetables a day, include whole grains    & fiber and avoid regularly eating high fat or \"junk\" foods?  Yes    Taking medications regularly:  Yes    Medication side effects:  None    Ability to successfully perform activities of daily living:  Housework requires assistance and laundry requires assistance    Home Safety:  Lack of grab bars in the bathroom    Hearing Impairment:  Difficulty following a conversation in a noisy restaurant or crowded room and need to ask people to speak up or repeat themselves    In the past 6 months, have you been bothered by leaking of urine?  No    In general, how would you rate your overall mental or emotional health?  Good      PHQ-2 Total Score: 0    Additional concerns today:  No    Do you feel safe in your environment? Yes    Have you ever done Advance Care Planning? (For example, a Health Directive, POLST, or a discussion with a medical provider or your loved ones about your wishes): No, advance care planning information given to patient to review.  Patient declined advance care planning discussion at this time.  Cognitive Screening   1) Repeat 3 items (Leader, Season, Table)    2) Clock draw: NORMAL  3) 3 item recall: Recalls 3 objects  Results: 3 items recalled: COGNITIVE IMPAIRMENT LESS LIKELY    Mini-CogTM Copyright MANISHA Fortune. Licensed by the author for use in [facility]; reprinted with permission (anm@.[facility]). All rights reserved.      Do you have sleep apnea, excessive " snoring or daytime drowsiness?: no    Reviewed and updated as needed this visit by clinical staff   Tobacco  Allergies  Meds              Reviewed and updated as needed this visit by Provider                 Social History     Tobacco Use     Smoking status: Never     Smokeless tobacco: Never   Substance Use Topics     Alcohol use: Yes     Comment: Alcoholic Drinks/day: socially       Alcohol Use 10/12/2022   Prescreen: >3 drinks/day or >7 drinks/week? No     Current providers sharing in care for this patient include:   Patient Care Team:  Melia Lambert MD as PCP - General (Internal Medicine)  Melia Lambert MD as Assigned PCP    The following health maintenance items are reviewed in Epic and correct as of today:  Health Maintenance   Topic Date Due     DEXA  Never done     MICROALBUMIN  Never done     ANNUAL REVIEW OF HM ORDERS  Never done     ADVANCE CARE PLANNING  Never done     HEPATITIS B IMMUNIZATION (1 of 3 - 3-dose series) Never done     ZOSTER IMMUNIZATION (1 of 2) Never done     COVID-19 Vaccine (4 - Booster for Moderna series) 01/06/2022     INFLUENZA VACCINE (1) 09/01/2022     MEDICARE ANNUAL WELLNESS VISIT  09/20/2022     Pneumococcal Vaccine: 65+ Years (1 - PCV) 09/20/2022     BMP  10/20/2022     LIPID  10/20/2022     HEMOGLOBIN  10/20/2022     FALL RISK ASSESSMENT  10/19/2023     MAMMO SCREENING  03/23/2024     COLORECTAL CANCER SCREENING  02/16/2026     DTAP/TDAP/TD IMMUNIZATION (4 - Td or Tdap) 11/11/2029     PHQ-2 (once per calendar year)  Completed     URINALYSIS  Completed     IPV IMMUNIZATION  Aged Out     MENINGITIS IMMUNIZATION  Aged Out     HEPATITIS C SCREENING  Discontinued     HIV SCREENING  Discontinued     PAP  Discontinued       Breast CA Risk Assessment (FHS-7) 3/23/2022 10/12/2022   Did any of your first-degree relatives have breast or ovarian cancer? No No   Did any of your relatives have bilateral breast cancer? No No   Did any man in your family have breast cancer? No  No   Did any woman in your family have breast and ovarian cancer? No No   Did any woman in your family have breast cancer before age 50 y? No -   Do you have 2 or more relatives with breast and/or ovarian cancer? No No   Do you have 2 or more relatives with breast and/or bowel cancer? No No     Pertinent mammograms are reviewed under the imaging tab.    Review of Systems   Constitutional: Negative for chills and fever.   HENT: Negative for congestion, ear pain, hearing loss and sore throat.    Eyes: Negative for pain and visual disturbance.   Respiratory: Positive for cough. Negative for shortness of breath.    Cardiovascular: Negative for chest pain, palpitations and peripheral edema.   Gastrointestinal: Negative for abdominal pain, constipation, diarrhea, heartburn, hematochezia and nausea.   Breasts:  Negative for tenderness, breast mass and discharge.   Genitourinary: Negative for dysuria, frequency, genital sores, hematuria, pelvic pain, urgency, vaginal bleeding and vaginal discharge.   Musculoskeletal: Positive for arthralgias and joint swelling. Negative for myalgias.   Skin: Negative for rash.   Neurological: Negative for dizziness, weakness, headaches and paresthesias.   Psychiatric/Behavioral: Negative for mood changes. The patient is not nervous/anxious.      As above, no vaginal spotting or bleeding.  No chest pains    OBJECTIVE:   /61 (BP Location: Left arm, Patient Position: Sitting, Cuff Size: Adult Regular)   Pulse 72   Temp 98.3  F (36.8  C) (Tympanic)   Resp 15   Ht 1.524 m (5')   Wt 105.3 kg (232 lb 3.2 oz)   SpO2 95%   BMI 45.35 kg/m   Estimated body mass index is 45.35 kg/m  as calculated from the following:    Height as of this encounter: 1.524 m (5').    Weight as of this encounter: 105.3 kg (232 lb 3.2 oz).  Physical Exam  General: well appearing female, alert and oriented x3  EYES: Eyelids, conjunctiva, and sclera were normal. Pupils were normal.   HEAD, EARS, NOSE, MOUTH, AND  THROAT: no cervical LAD, no thyromegaly or nodules appreciated. TMs are visualized and normal, oropharynx is clear.  RESPIRATORY: respirations non labored, CTA bl, no wheezes, rales, no forced expiratory wheezing.  CARDIOVASCULAR: Heart rate and rhythm were normal. No murmurs, rubs,gallops. There was no peripheral edema.   GASTROINTESTINAL: Positive bowel sounds, abdomen is soft, non tender, non distended.     MUSCULOSKELETAL: Muscle mass was normal for age. No joint synovitis or deformity.  LYMPHATIC: There were no enlarged nodes palpable.  SKIN/HAIR/NAILS: Skin color was normal.  No rashes.  NEUROLOGIC: The patient was alert and oriented.  Speech was normal.  There is no facial asymmetry.   PSYCHIATRIC:  Mood and affect were normal.   Breast exam: No axilla lymphadenopathy, breast masses or skin changes appreciated.    ASSESSMENT / PLAN:   Leeann was seen today for imm/inj.    Diagnoses and all orders for this visit:    Routine history and physical examination of adult  We will check blood work today, she will have bone density and mammogram done early next year.  Discussed to get shingles vaccine at the pharmacy and pneumonia shot in the future.  COVID and flu shot was administered today.    -     DEXA HIP/PELVIS/SPINE - Future; Future  -     INFLUENZA, QUAD, HD, PF, 65+ (FLUZONE HD)  -     COVID-19,PF,MODERNA BIVALENT 18+Yrs  -     MA Screen Bilateral w/[place]; Future    Hyperlipidemia LDL goal <130  LDL was significantly elevated, she was able to start Crestor and currently takes 1 tablet every other day.  -     Lipid panel reflex to direct LDL Non-fasting  -     Comprehensive metabolic panel  -     TSH with free T4 reflex    Stage 3a chronic kidney disease (H)  Kidney function has improved, previous rise in creatinine was most likely due to NSAID use.       Albumin Random Urine Quantitative with Creat Ratio; Future  -     Albumin Random Urine Quantitative with Creat Ratio  -     CBC with platelets and  differential  -     Comprehensive metabolic panel    Body mass index (BMI) 45.0-49.9, adult (H)  She gained weight since last year, plans to start weight watchers which is paid for by her insurance.  If that does not help, also gave her an option to seeing nutritionist for weight loss clinic.  -     Adult Sleep Eval & Management  Referral; Future    Snoring  Sleep apnea screening was recommended  -     Adult Sleep Eval & Management  Referral; Future    Primary hypertension  Well-controlled on lisinopril hydrochlorothiazide  -     Comprehensive metabolic panel    Mild intermittent asthma without complication  -     Adult Sleep Eval & Management  Referral; Future    Vitamin D deficiency  -     Vitamin D Deficiency        Patient has been advised of split billing requirements and indicates understanding: Yes    Estimated body mass index is 45.35 kg/m  as calculated from the following:    Height as of this encounter: 1.524 m (5').    Weight as of this encounter: 105.3 kg (232 lb 3.2 oz).    She reports that she has never smoked. She has never used smokeless tobacco.      Appropriate preventive services were discussed with this patient, including applicable screening as appropriate for cardiovascular disease, diabetes, osteopenia/osteoporosis, and glaucoma.  As appropriate for age/gender, discussed screening for colorectal cancer, prostate cancer, breast cancer, and cervical cancer. Checklist reviewing preventive services available has been given to the patient.    Reviewed patients plan of care and provided an AVS. The Basic Care Plan (routine screening as documented in Health Maintenance) for Leeann meets the Care Plan requirement. This Care Plan has been established and reviewed with the Patient.      Melia Lambert MD  [facility]    Identified Health Risks:

## 2022-11-17 DIAGNOSIS — E78.5 HYPERLIPIDEMIA LDL GOAL <130: ICD-10-CM

## 2022-11-18 RX ORDER — ROSUVASTATIN CALCIUM 10 MG/1
10 TABLET, COATED ORAL DAILY
Qty: 90 TABLET | Refills: 3 | Status: SHIPPED | OUTPATIENT
Start: 2022-11-18 | End: 2024-05-20

## 2022-11-18 NOTE — TELEPHONE ENCOUNTER
"Last Written Prescription Date:  10/25/21  Last Fill Quantity: 90,  # refills: 2   Last office visit provider:  10/19/22     Requested Prescriptions   Pending Prescriptions Disp Refills     rosuvastatin (CRESTOR) 10 MG tablet [Pharmacy Med Name: ROSUVASTATIN CALCIUM 10 MG 10 Tablet] 90 tablet 2     Sig: TAKE 1 TABLET (10 MG) BY MOUTH DAILY       Statins Protocol Passed - 11/17/2022 12:22 PM        Passed - LDL on file in past 12 months     Recent Labs   Lab Test 10/19/22  1053   *             Passed - No abnormal creatine kinase in past 12 months     Recent Labs   Lab Test 01/30/20  1616                   Passed - Recent (12 mo) or future (30 days) visit within the authorizing provider's specialty     Patient has had an office visit with the authorizing provider or a provider within the authorizing providers department within the previous 12 mos or has a future within next 30 days. See \"Patient Info\" tab in inbasket, or \"Choose Columns\" in Meds & Orders section of the refill encounter.              Passed - Medication is active on med list        Passed - Patient is age 18 or older        Passed - No active pregnancy on record        Passed - No positive pregnancy test in past 12 months             Yanely Pan RN 11/18/22 2:44 PM  "

## 2022-12-05 DIAGNOSIS — I10 ESSENTIAL HYPERTENSION: ICD-10-CM

## 2022-12-06 RX ORDER — LISINOPRIL AND HYDROCHLOROTHIAZIDE 20; 25 MG/1; MG/1
2 TABLET ORAL DAILY
Qty: 180 TABLET | Refills: 3 | Status: SHIPPED | OUTPATIENT
Start: 2022-12-06 | End: 2023-12-18

## 2022-12-06 NOTE — TELEPHONE ENCOUNTER
"Routing refill request to provider for review/approval because:  Labs out of range:  Creatinine 1.27    Last Written Prescription Date:  10/20/2021  Last Fill Quantity: 180,  # refills: 3   Last office visit provider:  10/19/2022     Requested Prescriptions   Pending Prescriptions Disp Refills     lisinopril-hydrochlorothiazide (ZESTORETIC) 20-25 MG tablet [Pharmacy Med Name: LISINOPRIL-HCTZ 20-25 MG TA 20-25 Tablet] 180 tablet 3     Sig: TAKE 2 TABLETS BY MOUTH DAILY       Diuretics (Including Combos) Protocol Failed - 12/6/2022  3:07 PM        Failed - Normal serum creatinine on file in past 12 months     Recent Labs   Lab Test 10/19/22  1053   CR 1.27*              Passed - Blood pressure under 140/90 in past 12 months     BP Readings from Last 3 Encounters:   10/19/22 116/61   04/25/22 135/77   10/20/21 100/64                 Passed - Recent (12 mo) or future (30 days) visit within the authorizing provider's specialty     Patient has had an office visit with the authorizing provider or a provider within the authorizing providers department within the previous 12 mos or has a future within next 30 days. See \"Patient Info\" tab in inbasket, or \"Choose Columns\" in Meds & Orders section of the refill encounter.              Passed - Medication is active on med list        Passed - Patient is age 18 or older        Passed - No active pregancy on record        Passed - Normal serum potassium on file in past 12 months     Recent Labs   Lab Test 10/19/22  1053   POTASSIUM 3.7                    Passed - Normal serum sodium on file in past 12 months     Recent Labs   Lab Test 10/19/22  1053                 Passed - No positive pregnancy test in past 12 months       ACE Inhibitors (Including Combos) Protocol Failed - 12/6/2022  3:07 PM        Failed - Normal serum creatinine on file in past 12 months     Recent Labs   Lab Test 10/19/22  1053   CR 1.27*       Ok to refill medication if creatinine is low          " "Passed - Blood pressure under 140/90 in past 12 months     BP Readings from Last 3 Encounters:   10/19/22 116/61   04/25/22 135/77   10/20/21 100/64                 Passed - Recent (12 mo) or future (30 days) visit within the authorizing provider's specialty     Patient has had an office visit with the authorizing provider or a provider within the authorizing providers department within the previous 12 mos or has a future within next 30 days. See \"Patient Info\" tab in inbasket, or \"Choose Columns\" in Meds & Orders section of the refill encounter.              Passed - Medication is active on med list        Passed - Patient is age 18 or older        Passed - No active pregnancy on record        Passed - Normal serum potassium on file in past 12 months     Recent Labs   Lab Test 10/19/22  1053   POTASSIUM 3.7             Passed - No positive pregnancy test within past 12 months             Sheridan Chandra RN 12/06/22 3:07 PM  "

## 2023-01-16 ASSESSMENT — SLEEP AND FATIGUE QUESTIONNAIRES
HOW LIKELY ARE YOU TO NOD OFF OR FALL ASLEEP WHILE SITTING INACTIVE IN A PUBLIC PLACE: WOULD NEVER DOZE
HOW LIKELY ARE YOU TO NOD OFF OR FALL ASLEEP WHILE SITTING QUIETLY AFTER LUNCH WITHOUT ALCOHOL: WOULD NEVER DOZE
HOW LIKELY ARE YOU TO NOD OFF OR FALL ASLEEP WHILE WATCHING TV: SLIGHT CHANCE OF DOZING
HOW LIKELY ARE YOU TO NOD OFF OR FALL ASLEEP WHILE SITTING AND READING: SLIGHT CHANCE OF DOZING
HOW LIKELY ARE YOU TO NOD OFF OR FALL ASLEEP WHILE SITTING AND TALKING TO SOMEONE: WOULD NEVER DOZE
HOW LIKELY ARE YOU TO NOD OFF OR FALL ASLEEP IN A CAR, WHILE STOPPED FOR A FEW MINUTES IN TRAFFIC: WOULD NEVER DOZE
HOW LIKELY ARE YOU TO NOD OFF OR FALL ASLEEP WHILE LYING DOWN TO REST IN THE AFTERNOON WHEN CIRCUMSTANCES PERMIT: MODERATE CHANCE OF DOZING
HOW LIKELY ARE YOU TO NOD OFF OR FALL ASLEEP WHEN YOU ARE A PASSENGER IN A CAR FOR AN HOUR WITHOUT A BREAK: WOULD NEVER DOZE

## 2023-01-18 ENCOUNTER — OFFICE VISIT (OUTPATIENT)
Dept: SLEEP MEDICINE | Facility: CLINIC | Age: 68
End: 2023-01-18
Attending: INTERNAL MEDICINE
Payer: COMMERCIAL

## 2023-01-18 VITALS
SYSTOLIC BLOOD PRESSURE: 110 MMHG | HEIGHT: 61 IN | OXYGEN SATURATION: 100 % | WEIGHT: 229.44 LBS | BODY MASS INDEX: 43.32 KG/M2 | DIASTOLIC BLOOD PRESSURE: 68 MMHG | HEART RATE: 79 BPM

## 2023-01-18 DIAGNOSIS — R06.83 SNORING: ICD-10-CM

## 2023-01-18 DIAGNOSIS — J45.20 MILD INTERMITTENT ASTHMA WITHOUT COMPLICATION: ICD-10-CM

## 2023-01-18 DIAGNOSIS — G47.30 SLEEP-DISORDERED BREATHING: Primary | ICD-10-CM

## 2023-01-18 DIAGNOSIS — N18.9 CHRONIC KIDNEY DISEASE, UNSPECIFIED CKD STAGE: ICD-10-CM

## 2023-01-18 DIAGNOSIS — G47.9 SLEEP DISTURBANCE: ICD-10-CM

## 2023-01-18 PROCEDURE — 99205 OFFICE O/P NEW HI 60 MIN: CPT | Performed by: NURSE PRACTITIONER

## 2023-01-18 NOTE — NURSING NOTE
Sleep study and return visit has been scheduled. Patient was instructed to reach out primary care clinic to set up lab only visit for ferritin. Patient also informed its a fasting blood draw. AVS given to patient. Sleep study information packet send via [employer].       Fidel Gaston [facility]

## 2023-01-18 NOTE — PROGRESS NOTES
Outpatient Sleep Medicine Consultation:      Name: Leeann Mckeon MRN# [id number]   Age: 67 year old YOB: 1955     Date of Consultation: January 18, 2023  Consultation is requested by: Melia Lambert MD  [address] [place] [address] Melia Lambert  Primary care provider: Melia Lambert       Reason for Sleep Consult:     Leeann Mckeon is sent by Melia Lambert for a sleep consultation regarding sleep disordered breathing    Patient s Reason for visit  Leeann Mckeon main reason for visit: Recommended by  as part of Medicare first visit  Patient states problem(s) started: As arthritis progresses, more leg pain at night which causes tossing & turning. This interrupts sleep  Leeann Mckeon's goals for this visit: Not sure           Assessment and Plan:     Summary Sleep Diagnoses:  Sleep disordered breathing  Multiple nocturnal awakenings  Insomnia, unspecified  Excessive daytime sleepiness  Nonrestorative sleep  Sleep disturbance due to repetitive leg movements    Comorbid Diagnoses:  Hypertension   Oobesity, class III  Esophageal reflux  Arthritis  Osteopenia    Summary Recommendations:    Orders Placed This Encounter   Procedures     Comprehensive Sleep Study     Ferritin   Patient recommended to have ferritin level drawn at any [facility] outpatient laboratory.  Patient should inbox provider through [facility] for results and plan of care    Patient recommended to follow through with comprehensive in-laboratory polysomnographic study to clarify cause of sleep disordered breathing, and repetitive nocturnal leg movements.  Patient has a STOP-BANG score of 6/8 which carries a high pretest probability of obstructive sleep apnea.    Recommended patient follow-up in clinic or via virtual visit per patient preference, to review sleep study results and further refine plan of care going forward.    All potential therapeutic options including positive airway pressure, mandibular advancing  oral appliances, and surgical options were discussed. Also counseled about impacts of weight loss and weight gain on obstructive sleep apnea.     Summary Counseling:    Pathophysiology of central sleep apnea, and obstructive sleep apnea reviewed with patient.  Provided patient with hard copies of instructional material    Process of sleep testing with formal in laboratory polysomnographic testing as well as home sleep testing reviewed with patient.    Complications of untreated sleep apnea reviewed with patient pertinent to her medical diagnoses.    Discussed repetitive leg movements and their impact on sleep.  Although she has had a considerable anemia work-up documented in her medical record, she has not had a ferritin level yet.    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  She presents to the sleep medicine clinic today for symptoms of sleep disordered breathing.  She has a PMH pertinent for a BMI of 43.66 today, mild intermittent asthma, chronic kidney disease, esophageal reflux, hypertension, and arthritis.  She is also concerned about symptoms of sleep disordered breathing that she has such as multiple nocturnal awakenings, sleep fragmentation, repetitive leg movements during sleep which awaken her, unspecified insomnia, nonrestorative sleep, and excessive daytime sleepiness.  Recent has a pertinent medical history notable for obesity, class III, gastroesophageal reflux, hypertension, osteopenia/arthritis, and chronic kidney disease.  Although patient spends approximately 12 hours  in bed, she estimates she gets between 5 and 6 hours of sleep per night as well as and intentional nap during the day.  A considerable amount of insomnia can be attributed to the pain that Leeann feels in her joints.  She is unable to use NSAIDs as in the recent past, she developed kidney disease as a result of NSAID overuse.  So she medicates herself with acetaminophen.  She has recently acquired a scooter which assists her in navigating when she is in public.  And this lessens her pain    Past Sleep Evaluations: None    SLEEP-WAKE SCHEDULE:     Work/School Days: Patient goes to school/work: No   Usually gets into bed at 9:30 - 10:00  Takes patient about 1/2 hour to fall asleep  Has trouble falling asleep 2 nights per week  Wakes up in the middle of the night 2-3 times.  Wakes up due to Pain;Use the bathroom  She has trouble falling back asleep 2 times a week.   It usually takes about 5 - 10 minutes to get back to sleep  Patient is usually up at 9 am  Uses alarm: No    Weekends/Non-work Days/All Other Days:  Usually gets into bed at 9:30 - 10:00   Takes patient about 1/2 hour to fall asleep  Patient is usually up at 9 am  Uses alarm: No    Sleep Need  Patient gets  5 - 6 hrs with a nap during the day sleep on average   Patient thinks she needs about 7-8 hrs sleep    Leeann Mckeon prefers to sleep in this position(s): Side;Head Elevated   Patient states they do the following activities in bed: Read;Use phone, computer, or tablet    Naps  Patient takes a purposeful nap 5 times a week and naps are usually 1 hr in duration  She feels better after a nap: Yes  She dozes off unintentionally 0 days per week  Patient has had a driving accident or near-miss due to sleepiness/drowsiness: No      SLEEP DISRUPTIONS:    Breathing/Snoring  Patient snores:Yes  Other people complain about her snoring: Yes  Patient has been told she stops breathing in her sleep:No  She has issues with the following: Getting up to urinate more than  once    Movement:  Patient gets pain, discomfort, with an urge to move:  Yes  It happens when she is resting:  Yes  It happens more at night:  Yes  Patient has been told she kicks her legs at night:  No     Behaviors in Sleep:  Leeann Mckeon has experienced the following behaviors while sleeping:    She has experienced sudden muscle weakness during the day: No      Is there anything else you would like your sleep provider to know:   -         CAFFEINE AND OTHER SUBSTANCES:    Patient consumes caffeinated beverages per day:  1  Last caffeine use is usually: 9:30 am  List of any prescribed or over the counter stimulants that patient takes:    List of any prescribed or over the counter sleep medication patient takes:    List of previous sleep medications that patient has tried:    Patient drinks alcohol to help them sleep: No  Patient drinks alcohol near bedtime: No    Family History:  Patient has a family member been diagnosed with a sleep disorder: No            SCALES:    EPWORTH SLEEPINESS SCALE      [place] Sleepiness Scale ( DANIELA Ann  1990-1997[facility]/English - Final version - 21 Nov 07 - [facility].) 1/16/2023   Sitting and reading Slight chance of dozing   Watching TV Slight chance of dozing   Sitting, inactive in a public place (e.g. a theatre or a meeting) Would never doze   As a passenger in a car for an hour without a break Would never doze   Lying down to rest in the afternoon when circumstances permit Moderate chance of dozing   Sitting and talking to someone Would never doze   Sitting quietly after a lunch without alcohol Would never doze   In a car, while stopped for a few minutes in traffic Would never doze   [place] Score (MC) 4   [place] Score (Sleep) 4         INSOMNIA SEVERITY INDEX (SUE)      Insomnia Severity Index (SUE) 1/16/2023   Difficulty falling asleep 2   Difficulty staying asleep 1   Problems waking up too early 0   How SATISFIED/DISSATISFIED are you with your CURRENT  "sleep pattern? 3   How NOTICEABLE to others do you think your sleep problem is in terms of impairing the quality of your life? 0   How WORRIED/DISTRESSED are you about your current sleep problem? 2   To what extent do you consider your sleep problem to INTERFERE with your daily functioning (e.g. daytime fatigue, mood, ability to function at work/daily chores, concentration, memory, mood, etc.) CURRENTLY? 1   SUE Total Score 9       Guidelines for Scoring/Interpretation:  Total score categories:  0-7 = No clinically significant insomnia   8-14 = Subthreshold insomnia   15-21 = Clinical insomnia (moderate severity)  22-28 = Clinical insomnia (severe)  Used via courtesy of www.Reval.com.va.gov with permission from Quinn Penn PhD., [facility]      STOP BANG     STOP BANG Questionnaire (  2008, the American Society of Anesthesiologists, Inc. [employer] 1/18/2023   1. Snoring - Do you snore loudly (louder than talking or loud enough to be heard through closed doors)? -   2. Tired - Do you often feel tired, fatigued, or sleepy during daytime? -   3. Observed - Has anyone observed you stop breathing during your sleep? -   4. Blood pressure - Do you have or are you being treated for high blood pressure? -   5. BMI - BMI more than 35 kg/m2? -   6. Age - Age over 50 yr old? -   7. Neck circumference - Neck circumference greater than 40 cm? -   8. Gender - Gender male? -   STOP BANG Score (MC): -   Neck Cir (cm) Clinic: 39   B/P Clinic: 110/68   BMI Clinic: 43.66         GAD7    No flowsheet data found.      CAGE-AID    No flowsheet data found.    CAGE-AID reprinted with permission from the [place] Medical Journal, BRANDON Ponce. and CLEMENTINE Cormier, \"Conjoint screening questionnaires for alcohol and drug abuse\" [place] Medical Journal 94: 135-140, 1995.      PATIENT HEALTH QUESTIONNAIRE-9 (PHQ - 9)    PHQ-9 ([employer]) 12/14/2020   1.  Little interest or pleasure in doing things 0   2.  Feeling " down, depressed, or hopeless 0       Developed by Fanny Bryant, Cesia Rojas, Sarbjit Foreman and colleagues, with an educational parish from [employer] No permission required to reproduce, translate, display or distribute.        Allergies:    Allergies   Allergen Reactions     Doxycycline Nausea and Vomiting     Sulfa (Sulfonamide Antibiotics) [Sulfa Drugs] Itching and Rash       Medications:    Current Outpatient Medications   Medication Sig Dispense Refill     acetaminophen (TYLENOL) 500 MG tablet Take 500 mg by mouth daily as needed       albuterol (PROAIR HFA/PROVENTIL HFA/VENTOLIN HFA) 108 (90 Base) MCG/ACT inhaler Inhale 1-2 puffs into the lungs every 4 hours as needed for shortness of breath / dyspnea or wheezing 6.7 g 0     cholecalciferol, vitamin D3, (VITAMIN D3) 1,000 unit capsule [CHOLECALCIFEROL, VITAMIN D3, (VITAMIN D3) 1,000 UNIT CAPSULE] Take 4,000 Units by mouth daily.        cyanocobalamin, vitamin B-12, 2,500 mcg Chew [CYANOCOBALAMIN, VITAMIN B-12, 2,500 MCG CHEW] Chew.       fluticasone (FLONASE) 50 MCG/ACT nasal spray Spray 1 spray into both nostrils daily 16 mL 3     guaiFENesin-codeine (ROBITUSSIN AC) 100-10 MG/5ML solution 5ml   at bed time as needed for cough 236 mL 0     lisinopril-hydrochlorothiazide (ZESTORETIC) 20-25 MG tablet TAKE 2 TABLETS BY MOUTH DAILY 180 tablet 3     meloxicam (MOBIC) 7.5 MG tablet Take 1 tablet (7.5 mg) by mouth daily 30 tablet 0     montelukast (SINGULAIR) 10 mg tablet [MONTELUKAST (SINGULAIR) 10 MG TABLET] TAKE ONE TABLET ( 10 MG TOTAL) BY MOUTH AT BEDTIME 90 tablet 2     Multiple Vitamin (MULTI-VITAMINS) TABS Take 1 tablet by mouth daily       MULTIVITAMIN ORAL [MULTIVITAMIN ORAL] Take 1 tablet by mouth daily.       omeprazole (PRILOSEC) 20 MG DR capsule TAKE 1 CAPSULE (20 MG) BY MOUTH DAILY 90 capsule 2     rosuvastatin (CRESTOR) 10 MG tablet TAKE 1 TABLET (10 MG) BY MOUTH DAILY 90 tablet 3     albuterol (PROAIR HFA;PROVENTIL HFA;VENTOLIN HFA)  90 mcg/actuation inhaler [ALBUTEROL (PROAIR HFA;PROVENTIL HFA;VENTOLIN HFA) 90 MCG/ACTUATION INHALER] Inhale 2 puffs every 6 (six) hours as needed for wheezing. 1 each 0       Problem List:  Patient Active Problem List    Diagnosis Date Noted     Chronic kidney disease, stage 3 10/20/2021     Priority: Medium     Body mass index (BMI) 45.0-49.9, adult (H) 12/14/2020     Priority: Medium     Hearing loss 11/11/2016     Priority: Medium     Primary hypertension      Priority: Medium     Created by Conversion  Replacement Utility updated for latest IMO load         Osteopenia      Priority: Medium     Created by Conversion  Replacement Utility updated for latest IMO load         Arthritis      Priority: Medium     Created by Conversion  Replacement Utility updated for latest IMO load         Adenomatous colon polyp 11/11/2015     Priority: Medium     Obesity      Priority: Medium     Created by Conversion         Esophageal Reflux      Priority: Medium     Created by Conversion            Past Medical/Surgical History:  Past Medical History:   Diagnosis Date     Acid reflux      Arthritis     knee     Cholelithiasis      Essential hypertension      Past Surgical History:   Procedure Laterality Date     HC REMOVE TONSILS/ADENOIDS,<13 Y/O      Description: Tonsillectomy With Adenoidectomy;  Recorded: 09/27/2013;     LAPAROSCOPIC CHOLECYSTECTOMY N/A 8/2/2017    Procedure: CHOLECYSTECTOMY, LAPAROSCOPIC;  Surgeon: Dawson Proctor MD;  Location: [facility];  Service:        Social History:  Social History     Socioeconomic History     Marital status:      Spouse name: Not on file     Number of children: Not on file     Years of education: Not on file     Highest education level: Not on file   Occupational History     Not on file   Tobacco Use     Smoking status: Never     Smokeless tobacco: Never   Substance and Sexual Activity     Alcohol use: Yes     Comment: Alcoholic Drinks/day: socially     Drug use: No      Sexual activity: Yes     Partners: Male   Other Topics Concern     Not on file   Social History Narrative    She is . She has 2 children, a boy and a girl. 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  She does not smoke cigarettes and has about 2 alcoholic beverages a week and tries to exercise.     Social Determinants of Health     Financial Resource Strain: Not on file   Food Insecurity: Not on file   Transportation Needs: Not on file   Physical Activity: Not on file   Stress: Not on file   Social Connections: Not on file   Intimate Partner Violence: Not on file   Housing Stability: Not on file       Family History:  Family History   Problem Relation Age of Onset     Osteosarcoma Brother          age 31     Diabetes Mother          age 81     Anemia Father          age 88     Hyperlipidemia Sister      Breast Cancer No family hx of        Review of Systems:  A complete review of systems reviewed by me is negative with the exeption of what has been mentioned in the history of present illness.  In the last TWO WEEKS have you experienced any of the following symptoms?  Fevers: No  Night Sweats: No  Weight Gain: No  Pain at Night: Yes  Double Vision: No  Changes in Vision: No  Difficulty Breathing through Nose: No  Sore Throat in Morning: No  Dry Mouth in the Morning: Yes  Shortness of Breath Lying Flat: No  Shortness of Breath With Activity: No  Awakening with Shortness of Breath: No  Increased Cough: No  Heart Racing at Night: No  Swelling in Feet or Legs: No  Diarrhea at Night: No  Heartburn at Night: No  Urinating More than Once at Night: Yes  Losing Control of Urine at Night: No  Joint Pains at Night: Yes  Headaches in Morning: No  Weakness in Arms or Legs: No  Depressed Mood: No  Anxiety: No     Skin: negative  Eyes: negative, glasses  Ears/Nose/Throat: negative  Respiratory: No shortness of breath, dyspnea on exertion, cough, or  "hemoptysis  Cardiovascular: positive for lower extremity edema  Musculoskeletal: positive for osteoarthritis, joint pain, joint stiffness and nocturnal cramping  Neurologic: negative  Psychiatric: positive for sleep disturbance and anxiety  Hematologic/Lymphatic/Immunologic: negative  Endocrine: negative    Physical Examination:  Vitals: /68   Pulse 79   Ht 1.544 m (5' 0.79\")   Wt 104.1 kg (229 lb 7 oz)   SpO2 100%   BMI 43.66 kg/m    BMI= Body mass index is 43.66 kg/m .    Neck Cir (cm): 39 cm     Physical Exam  Vitals and nursing note reviewed.   Constitutional:       Appearance: Normal appearance. She is obese.   HENT:      Head: Normocephalic and atraumatic.      Right Ear: External ear normal.      Left Ear: External ear normal.      Nose: Nose normal.      Mouth/Throat:      Mouth: Mucous membranes are moist.      Pharynx: Oropharynx is clear.   Eyes:      Conjunctiva/sclera: Conjunctivae normal.   Neck:      Thyroid: Thyroid normal.   Cardiovascular:      Rate and Rhythm: Normal rate and regular rhythm.      Heart sounds: Normal heart sounds.   Pulmonary:      Effort: Pulmonary effort is normal.      Breath sounds: Normal breath sounds.   Musculoskeletal:      Cervical back: Normal range of motion and neck supple.      Right lower leg: Edema present.      Left lower leg: Edema present.   Skin:     General: Skin is warm and dry.      Capillary Refill: Capillary refill takes 2 to 3 seconds.   Neurological:      General: No focal deficit present.      Mental Status: She is alert and oriented to person, place, and time.   Psychiatric:         Mood and Affect: Mood normal.         Behavior: Behavior normal.         Thought Content: Thought content normal.         Judgment: Judgment normal.     Physical Exam   Nursing note and vitals reviewed.  Constitutional: She appears healthy.   HENT:   Head: Normocephalic and atraumatic.   Right Ear: External ear normal.   Left Ear: External ear normal.   Nose: Nose " normal.   Mouth/Throat: Mucous membranes are moist. Oropharynx is clear.   Eyes: Conjunctivae are normal.   Neck: Thyroid normal.   Cardiovascular: Normal rate, regular rhythm and normal heart sounds.   Pulmonary/Chest: Effort normal and breath sounds normal.   Abdominal: Normal appearance.   Musculoskeletal:      Cervical back: Normal range of motion and neck supple.      Right lower leg: Edema present.      Left lower leg: Edema present.   Neurological: She is alert and oriented to person, place, and time.   Skin: Skin is warm and dry. Capillary refill takes 2 to 3 seconds.   Psychiatric: Her behavior is normal. Mood, judgment and thought content normal.         Mallampati Class: III.  Tonsillar Stage: 1  hidden by pillars.         Data: All pertinent previous laboratory data reviewed     Recent Labs   Lab Test 10/19/22  1053 10/20/21  1058    141   POTASSIUM 3.7 3.7   CHLORIDE 108* 107   CO2 22 21*   ANIONGAP 13 13   GLC 93 96   BUN 34.9* 28*   CR 1.27* 1.15*   MARQUIS 10.2 10.1       Recent Labs   Lab Test 10/19/22  1053   WBC 6.5   RBC 3.77*   HGB 11.8   HCT 36.0   MCV 96   MCH 31.3   MCHC 32.8   RDW 12.8          Recent Labs   Lab Test 10/19/22  1053   PROTTOTAL 7.4   ALBUMIN 4.3   BILITOTAL 0.4   ALKPHOS 87   AST 18   ALT 8*       TSH (uIU/mL)   Date Value   10/19/2022 0.92   10/20/2021 0.86   12/16/2020 1.15       No results found for: UAMP, UBARB, BENZODIAZEUR, UCANN, UCOC, OPIT, UPCP    Iron Sat Index   Date/Time Value Ref Range Status   10/20/2021 10:58 AM 29 15 - 46 % Final       No results found for: PH, PHARTERIAL, PO2, [id number], SAT, PCO2, HCO3, BASEEXCESS, KISHAN, BEB    @LABRCNTIPR(phv:4,pco2v:4,po2v:4,hco3v:4,pauline:4,o2per:4)@    Echocardiology: No results found for this or any previous visit (from the past 4320 hour(s)).    Chest x-ray: XR Chest 2 Views 04/25/2022    Narrative  EXAM DATE:         04/25/2022    EXAM: X-RAY CHEST, 2 VIEWS, FRONTAL AND LATERAL  LOCATION: [facility]  [facility]  DATE/TIME: 4/25/2022 2:15 PM    INDICATION: Persistent cough with progressive shortness of breath and wheezing over the past 5 to 7 days.  COMPARISON: None.    IMPRESSION: Negative chest. Lungs are clear. No consolidation. No pleural effusion or pneumothorax. Normal heart size.      Chest CT: No results found for this or any previous visit from the past 365 days.      PFT: Most Recent [facility] Pulmonary Function Testing    No results found for: 20001  No results found for: 20002  No results found for: 20003  No results found for: 20015  No results found for: 20016  No results found for: 20027  No results found for: 20028  No results found for: 20029  No results found for: 20079  No results found for: 20080  No results found for: 20081  No results found for: 20335  No results found for: 20105  No results found for: 20053  No results found for: 20054  No results found for: 20055      Yanely Oates, ANTONIO CNP 1/18/2023     Sleep Medicine    This note was written with the assistance of the Dragon voice-dictation technology software. The final document, although reviewed, may contain errors. For corrections, please contact the office.

## 2023-01-18 NOTE — NURSING NOTE
"Chief Complaint   Patient presents with     Consult     Referred PCP for medicaid, snoring, wake up multiple times a night       Initial /68   Pulse 79   Ht 1.544 m (5' 0.79\")   Wt 104.1 kg (229 lb 7 oz)   SpO2 100%   BMI 43.66 kg/m   Estimated body mass index is 43.66 kg/m  as calculated from the following:    Height as of this encounter: 1.544 m (5' 0.79\").    Weight as of this encounter: 104.1 kg (229 lb 7 oz).    Medication Reconciliation: complete    Neck circumference: 38.5 centimeters.    Fidel Gaston BESSY  [facility]      "

## 2023-01-18 NOTE — PATIENT INSTRUCTIONS
"      MY TREATMENT INFORMATION FOR SLEEP APNEA-  Leeann Mckeon    DOCTOR : ANTONIO Vera CNP    Am I having a sleep study at a sleep center?  --->Due to normal delays, you will be contacted within 2-4 weeks to schedule    Am I having a home sleep study?  --->Watch the video for the device you are using:    -/drop off device-   https://www.OpenStudyube.com/watch?v=yGGFBdELGhk    -Disposable device sent out require phone/computer application-   https://www.Moverati.com/watch?v=BCce_vbiwxE      Frequently asked questions:  1. What is Obstructive Sleep Apnea (SG)? SG is the most common type of sleep apnea. Apnea means, \"without breath.\"  Apnea is most often caused by narrowing or collapse of the upper airway as muscles relax during sleep.   Almost everyone has occasional apneas. Most people with sleep apnea have had brief interruptions at night frequently for many years.  The severity of sleep apnea is related to how frequent and severe the events are.   2. What are the consequences of SG? Symptoms include: feeling sleepy during the day, snoring loudly, gasping or stopping of breathing, trouble sleeping, and occasionally morning headaches or heartburn at night.  Sleepiness can be serious and even increase the risk of falling asleep while driving. Other health consequences may include development of high blood pressure and other cardiovascular disease in persons who are susceptible. Untreated SG  can contribute to heart disease, stroke and diabetes.   3. What are the treatment options? In most situations, sleep apnea is a lifelong disease that must be managed with daily therapy. Medications are not effective for sleep apnea and surgery is generally not considered until other therapies have been tried. Your treatment is your choice . Continuous Positive Airway (CPAP) works right away and is the therapy that is effective in nearly everyone. An oral device to hold your jaw forward is usually the next most " reliable option. Other options include postioning devices (to keep you off your back), weight loss, and surgery including a tongue pacing device. There is more detail about some of these options below.  4. Are my sleep studies covered by insurance? Although we will request verification of coverage, we advise you also check in advance of the study to ensure there is coverage.    Important tips for those choosing CPAP and similar devices   Know your equipment:  CPAP is continuous positive airway pressure that prevents obstructive sleep apnea by keeping the throat from collapsing while you are sleeping. In most cases, the device is  smart  and can slowly self-adjusts if your throat collapses and keeps a record every day of how well you are treated-this information is available to you and your care team.  BPAP is bilevel positive airway pressure that keeps your throat open and also assists each breath with a pressure boost to maintain adequate breathing.  Special kinds of BPAP are used in patients who have inadequate breathing from lung or heart disease. In most cases, the device is  smart  and can slowly self-adjusts to assist breathing. Like CPAP, the device keeps a record of how well you are treated.  Your mask is your connection to the device. You get to choose what feels most comfortable and the staff will help to make sure if fits. Here: are some examples of the different masks that are available:       Key points to remember on your journey with sleep apnea:  Sleep study.  PAP devices often need to be adjusted during a sleep study to show that they are effective and adjusted right.  Good tips to remember: Try wearing just the mask during a quiet time during the day so your body adapts to wearing it. A humidifier is recommended for comfort in most cases to prevent drying of your nose and throat. Allergy medication from your provider may help you if you are having nasal congestion.  Getting settled-in. It takes  more than one night for most of us to get used to wearing a mask. Try wearing just the mask during a quiet time during the day so your body adapts to wearing it. A humidifier is recommended for comfort in most cases. Our team will work with you carefully on the first day and will be in contact within 4 days and again at 2 and 4 weeks for advice and remote device adjustments. Your therapy is evaluated by the device each day.   Use it every night. The more you are able to sleep naturally for 7-8 hours, the more likely you will have good sleep and to prevent health risks or symptoms from sleep apnea. Even if you use it 4 hours it helps. Occasionally all of us are unable to use a medical therapy, in sleep apnea, it is not dangerous to miss one night.   Communicate. Call our skilled team on the number provided on the first day if your visit for problems that make it difficult to wear the device. Over 2 out of 3 patients can learn to wear the device long-term with help from our team. Remember to call our team or your sleep providers if you are unable to wear the device as we may have other solutions for those who cannot adapt to mask CPAP therapy. It is recommended that you sleep your sleep provider within the first 3 months and yearly after that if you are not having problems.   Use it for your health. We encourage use of CPAP masks during daytime quiet periods to allow your face and brain to adapt to the sensation of CPAP so that it will be a more natural sensation to awaken to at night or during naps. This can be very useful during the first few weeks or months of adapting to CPAP though it does not help medically to wear CPAP during wakefulness and  should not be used as a strategy just to meet guidelines.  Take care of your equipment. Make sure you clean your mask and tubing using directions every day and that your filter and mask are replaced as recommended or if they are not working.     BESIDES CPAP, WHAT OTHER  THERAPIES ARE THERE?    Positioning Device  Positioning devices are generally used when sleep apnea is mild and only occurs on your back.This example shows a pillow that straps around the waist. It may be appropriate for those whose sleep study shows milder sleep apnea that occurs primarily when lying flat on one's back. Preliminary studies have shown benefit but effectiveness at home may need to be verified by a home sleep test. These devices are generally not covered by medical insurance.  Examples of devices that maintain sleeping on the back to prevent snoring and mild sleep apnea.    Belt type body positioner  http://[employer]/    Electronic reminder  http://nightshifttherapy.com/            Oral Appliance  What is oral appliance therapy?  An oral appliance device fits on your teeth at night like a retainer used after having braces. The device is made by a specialized dentist and requires several visits over 1-2 months before a manufactured device is made to fit your teeth and is adjusted to prevent your sleep apnea. Once an oral device is working properly, snoring should be improved. A home sleep test may be recommended at that time if to determine whether the sleep apnea is adequately treated.       Some things to remember:  -Oral devices are often, but not always, covered by your medical insurance. Be sure to check with your insurance provider.   -If you are referred for oral therapy, you will be given a list of specialized dentists to consider or you may choose to visit the Web site of the American Academy of Dental Sleep Medicine  -Oral devices are less likely to work if you have severe sleep apnea or are extremely overweight.     More detailed information  An oral appliance is a small acrylic device that fits over the upper and lower teeth  (similar to a retainer or a mouth guard). This device slightly moves jaw forward, which moves the base of the tongue forward, opens the airway, improves breathing  for effective treat snoring and obstructive sleep apnea in perhaps 7 out of 10 people .  The best working devices are custom-made by a dental device  after a mold is made of the teeth 1, 2, 3.  When is an oral appliance indicated?  Oral appliance therapy is recommended as a first-line treatment for patients with primary snoring, mild sleep apnea, and for patients with moderate sleep apnea who prefer appliance therapy to use of CPAP4, 5. Severity of sleep apnea is determined by sleep testing and is based on the number of respiratory events per hour of sleep.   How successful is oral appliance therapy?  The success rate of oral appliance therapy in patients with mild sleep apnea is 75-80% while in patients with moderate sleep apnea it is 50-70%. The chance of success in patients with severe sleep apnea is 40-50%. The research also shows that oral appliances have a beneficial effect on the cardiovascular health of SG patients at the same magnitude as CPAP therapy7.  Oral appliances should be a second-line treatment in cases of severe sleep apnea, but if not completely successful then a combination therapy utilizing CPAP plus oral appliance therapy may be effective. Oral appliances tend to be effective in a broad range of patients although studies show that the patients who have the highest success are females, younger patients, those with milder disease, and less severe obesity. 3, 6.   Finding a dentist that practices dental sleep medicine  Specific training is available through the American Academy of Dental Sleep Medicine for dentists interested in working in the field of sleep. To find a dentist who is educated in the field of sleep and the use of oral appliances, near you, visit the Web site of the American Academy of Dental Sleep Medicine.    References  1. Shakira et al. Objectively measured vs self-reported compliance during oral appliance therapy for sleep-disordered breathing. Chest 2013;  144(5): 7358-7939.  2. Vandana et al. Objective measurement of compliance during oral appliance therapy for sleep-disordered breathing. Thorax 2013; 68(1): 91-96.  3. Colby et al. Mandibular advancement devices in 620 men and women with SG and snoring: tolerability and predictors of treatment success. Chest 2004; 125: 4327-2148.  4. Ilan, et al. Oral appliances for snoring and SG: a review. Sleep 2006; 29: 244-262.  5. Amada et al. Oral appliance treatment for SG: an update. J Clin Sleep Med 2014; 10(2): 215-227.  6. Ayaan et al. Predictors of OSAH treatment outcome. J Dent Res 2007; 86: 2529-8272.      Weight Loss:    Weight loss is a long-term strategy that may improve sleep apnea in some patients.    Weight management is a personal decision and the decision should be based on your interest and the potential benefits.  If you are interested in exploring weight loss strategies, the following discussion covers the impact on weight loss on sleep apnea and the approaches that may be successful.    Being overweight does not necessarily mean you will have health consequences.  Those who have BMI over 35 or over 27 with existing medical conditions carries greater risk.   Weight loss decreases severity of sleep apnea in most people with obesity. For those with mild obesity who have developed snoring with weight gain, even 15-30 pound weight loss can improve and occasionally eliminate sleep apnea.  Structured and life-long dietary and health habits are necessary to lose weight and keep healthier weight levels.     Though there may be significant health benefits from weight loss, long-term weight loss is very difficult to achieve- studies show success with dietary management in less than 10% of people. In addition, substantial weight loss may require years of dietary control and may be difficult if patients have severe obesity. In these cases, surgical management may be considered.  Finally, older  individuals who have tolerated obesity without health complications may be less likely to benefit from weight loss strategies.      [unfilled]    Surgery:    Surgery for obstructive sleep apnea is considered generally only when other therapies fail to work. Surgery may be discussed with you if you are having a difficult time tolerating CPAP and or when there is an abnormal structure that requires surgical correction.  Nose and throat surgeries often enlarge the airway to prevent collapse.  Most of these surgeries create pain for 1-2 weeks and up to half of the most common surgeries are not effective throughout life.  You should carefully discuss the benefits and drawbacks to surgery with your sleep provider and surgeon to determine if it is the best solution for you.   More information  Surgery for SG is directed at areas that are responsible for narrowing or complete obstruction of the airway during sleep.  There are a wide range of procedures available to enlarge and/or stabilize the airway to prevent blockage of breathing in the three major areas where it can occur: the palate, tongue, and nasal regions.  Successful surgical treatment depends on the accurate identification of the factors responsible for obstructive sleep apnea in each person.  A personalized approach is required because there is no single treatment that works well for everyone.  Because of anatomic variation, consultation with an examination by a sleep surgeon is a critical first step in determining what surgical options are best for each patient.  In some cases, examination during sedation may be recommended in order to guide the selection of procedures.  Patients will be counseled about risks and benefits as well as the typical recovery course after surgery. Surgery is typically not a cure for a person s SG.  However, surgery will often significantly improve one s SG severity (termed  success rate ).  Even in the absence of a cure, surgery  will decrease the cardiovascular risk associated with OSA7; improve overall quality of life8 (sleepiness, functionality, sleep quality, etc).      Palate Procedures:  Patients with SG often have narrowing of their airway in the region of their tonsils and uvula.  The goals of palate procedures are to widen the airway in this region as well as to help the tissues resist collapse.  Modern palate procedure techniques focus on tissue conservation and soft tissue rearrangement, rather than tissue removal.  Often the uvula is preserved in this procedure. Residual sleep apnea is common in patient after pharyngoplasty with an average reduction in sleep apnea events of 33%2.      Tongue Procedures:  ExamWhile patients are awake, the muscles that surround the throat are active and keep this region open for breathing. These muscles relax during sleep, allowing the tongue and other structures to collapse and block breathing.  There are several different tongue procedures available.  Selection of a tongue base procedure depends on characteristics seen on physical exam.  Generally, procedures are aimed at removing bulky tissues in this area or preventing the back of the tongue from falling back during sleep.  Success rates for tongue surgery range from 50-62%3.    Hypoglossal Nerve Stimulation:  Hypoglossal nerve stimulation has recently received approval from the [place] Food and Drug Administration for the treatment of obstructive sleep apnea.  This is based on research showing that the system was safe and effective in treating sleep apnea6.  Results showed that the median AHI score decreased 68%, from 29.3 to 9.0. This therapy uses an implant system that senses breathing patterns and delivers mild stimulation to airway muscles, which keeps the airway open during sleep.  The system consists of three fully implanted components: a small generator (similar in size to a pacemaker), a breathing sensor, and a stimulation lead.   Using a small handheld remote, a patient turns the therapy on before bed and off upon awakening.    Candidates for this device must be greater than 18 years of age, have moderate to severe SG (AHI between 15-65), BMI less than 35, have tried CPAP/oral appliance for at least 8 weeks without success, and have appropriate upper airway anatomy (determined by a sleep endoscopy performed by Dr. Miguel Ángel Ray).    Hypoglossal Nerve Stimulation Pathway:    The sleep surgeon s office will work with the patient through the insurance prior-authorization process (including communications and appeals).    Nasal Procedures:  Nasal obstruction can interfere with nasal breathing during the day and night.  Studies have shown that relief of nasal obstruction can improve the ability of some patients to tolerate positive airway pressure therapy for obstructive sleep apnea1.  Treatment options include medications such as nasal saline, topical corticosteroid and antihistamine sprays, and oral medications such as antihistamines or decongestants. Non-surgical treatments can include external nasal dilators for selected patients. If these are not successful by themselves, surgery can improve the nasal airway either alone or in combination with these other options.      Combination Procedures:  Combination of surgical procedures and other treatments may be recommended, particularly if patients have more than one area of narrowing or persistent positional disease.  The success rate of combination surgery ranges from 66-80%2,3.    References  Kedar MATTHEWS. The Role of the Nose in Snoring and Obstructive Sleep Apnoea: An Update.  Eur Arch Otorhinolaryngol. 2011; 268: 1365-73.   Megan SM; Thien JA; Aman JR; Pallanch JF; Milana MB; Laura SG; Becky MONSALVE. Surgical modifications of the upper airway for obstructive sleep apnea in adults: a systematic review and meta-analysis. SLEEP 2010;33(10):3038-1339. Byron WARREN. Hypopharyngeal surgery in  obstructive sleep apnea: an evidence-based medicine review.  Arch Otolaryngol Head Neck Surg. 2006 Feb;132(2):206-13.  Carlos Enrique YH1, Shadia Y, Parrish LANDY. The efficacy of anatomically based multilevel surgery for obstructive sleep apnea. Otolaryngol Head Neck Surg. 2003 Oct;129(4):327-35.  Byron WARREN, Goldberg A. Hypopharyngeal Surgery in Obstructive Sleep Apnea: An Evidence-Based Medicine Review. Arch Otolaryngol Head Neck Surg. 2006 Feb;132(2):206-13.  Alton COSME et al. Upper-Airway Stimulation for Obstructive Sleep Apnea.  N Engl J Med. 2014 Jan 9;370(2):139-49.  Ravinder Y et al. Increased Incidence of Cardiovascular Disease in Middle-aged Men with Obstructive Sleep Apnea. Am J Respir Crit Care Med; 2002 166: 159-165  Fernandeserick SANCHEZ et al. Studying Life Effects and Effectiveness of Palatopharyngoplasty (SLEEP) study: Subjective Outcomes of Isolated Uvulopalatopharyngoplasty. Otolaryngol Head Neck Surg. 2011; 144: 623-631.        WHAT IF I ONLY HAVE SNORING?    Mandibular advancement devices, lateral sleep positioning, long-term weight loss and treatment of nasal allergies have been shown to improve snoring.  Exercising tongue muscles with a game (MedyMatchttps://[employer]/us/loida/soundly-reduce-snoring/hd8115011002) or stimulating the tongue during the day with a device (https://doi.org/10.3390/okt12397785) have improved snoring in some individuals.    Remember to Drive Safe... Drive Alive     Sleep health profoundly affects your health, mood, and your safety.  Thirty three percent of the population (one in three of us) is not getting enough sleep and many have a sleep disorder. Not getting enough sleep or having an untreated / undertreated sleep condition may make us sleepy without even knowing it. In fact, our driving could be dramatically impaired due to our sleep health. As your provider, here are some things I would like you to know about driving:     Here are some warning signs for impairment and dangerous drowsy driving:               -Having been awake more than 16 hours               -Looking tired               -Eyelid drooping              -Head nodding (it could be too late at this point)              -Driving for more than 30 minutes     Some things you could do to make the driving safer if you are experiencing some drowsiness:              -Stop driving and rest              -Call for transportation              -Make sure your sleep disorder is adequately treated     Some things that have been shown NOT to work when experiencing drowsiness while driving:              -Turning on the radio              -Opening windows              -Eating any  distracting  /  entertaining  foods (e.g., sunflower seeds, candy, or any other)              -Talking on the phone      Your decision may not only impact your life, but also the life of others. Please, remember to drive safe for yourself and all of us.

## 2023-01-20 PROBLEM — N18.30 CHRONIC KIDNEY DISEASE, STAGE 3 (H): Status: ACTIVE | Noted: 2021-10-20

## 2023-02-01 ENCOUNTER — LAB (OUTPATIENT)
Dept: LAB | Facility: CLINIC | Age: 68
End: 2023-02-01
Payer: COMMERCIAL

## 2023-02-01 DIAGNOSIS — G47.9 SLEEP DISTURBANCE: ICD-10-CM

## 2023-02-01 DIAGNOSIS — N18.9 CHRONIC KIDNEY DISEASE, UNSPECIFIED CKD STAGE: ICD-10-CM

## 2023-02-01 LAB — FERRITIN SERPL-MCNC: 161 NG/ML (ref 11–328)

## 2023-02-01 PROCEDURE — 36415 COLL VENOUS BLD VENIPUNCTURE: CPT

## 2023-02-01 PROCEDURE — 82728 ASSAY OF FERRITIN: CPT

## 2023-02-22 ENCOUNTER — VIRTUAL VISIT (OUTPATIENT)
Dept: INTERNAL MEDICINE | Facility: CLINIC | Age: 68
End: 2023-02-22
Payer: COMMERCIAL

## 2023-02-22 ENCOUNTER — TELEPHONE (OUTPATIENT)
Dept: INTERNAL MEDICINE | Facility: CLINIC | Age: 68
End: 2023-02-22

## 2023-02-22 ENCOUNTER — E-VISIT (OUTPATIENT)
Dept: INTERNAL MEDICINE | Facility: CLINIC | Age: 68
End: 2023-02-22
Payer: COMMERCIAL

## 2023-02-22 DIAGNOSIS — J40 BRONCHITIS: Primary | ICD-10-CM

## 2023-02-22 DIAGNOSIS — R06.02 SHORTNESS OF BREATH: Primary | ICD-10-CM

## 2023-02-22 PROCEDURE — 99207 PR NON-BILLABLE SERV PER CHARTING: CPT | Performed by: INTERNAL MEDICINE

## 2023-02-22 PROCEDURE — 99441 PR PHYSICIAN TELEPHONE EVALUATION 5-10 MIN: CPT | Mod: 95 | Performed by: INTERNAL MEDICINE

## 2023-02-22 RX ORDER — AZITHROMYCIN 250 MG/1
TABLET, FILM COATED ORAL
Qty: 6 TABLET | Refills: 0 | Status: SHIPPED | OUTPATIENT
Start: 2023-02-22 | End: 2023-02-27

## 2023-02-22 RX ORDER — ALBUTEROL SULFATE 90 UG/1
2 AEROSOL, METERED RESPIRATORY (INHALATION) EVERY 6 HOURS PRN
Qty: 18 G | Refills: 3 | Status: SHIPPED | OUTPATIENT
Start: 2023-02-22

## 2023-02-22 RX ORDER — PREDNISONE 10 MG/1
TABLET ORAL
Qty: 12 TABLET | Refills: 0 | Status: SHIPPED | OUTPATIENT
Start: 2023-02-22 | End: 2023-10-26

## 2023-02-22 ASSESSMENT — ASTHMA QUESTIONNAIRES
QUESTION_1 LAST FOUR WEEKS HOW MUCH OF THE TIME DID YOUR ASTHMA KEEP YOU FROM GETTING AS MUCH DONE AT WORK, SCHOOL OR AT HOME: NONE OF THE TIME
QUESTION_5 LAST FOUR WEEKS HOW WOULD YOU RATE YOUR ASTHMA CONTROL: WELL CONTROLLED
QUESTION_3 LAST FOUR WEEKS HOW OFTEN DID YOUR ASTHMA SYMPTOMS (WHEEZING, COUGHING, SHORTNESS OF BREATH, CHEST TIGHTNESS OR PAIN) WAKE YOU UP AT NIGHT OR EARLIER THAN USUAL IN THE MORNING: ONCE OR TWICE
ACT_TOTALSCORE: 22
QUESTION_2 LAST FOUR WEEKS HOW OFTEN HAVE YOU HAD SHORTNESS OF BREATH: ONCE OR TWICE A WEEK
ACT_TOTALSCORE: 22
QUESTION_4 LAST FOUR WEEKS HOW OFTEN HAVE YOU USED YOUR RESCUE INHALER OR NEBULIZER MEDICATION (SUCH AS ALBUTEROL): NOT AT ALL

## 2023-02-22 NOTE — PATIENT INSTRUCTIONS
Dear Leeann Mckeon,    We are sorry you are not feeling well. Based on the responses you provided, it is recommended that you be seen in-person in urgent care so we can better evaluate your symptoms. Please click here to find the nearest urgent care location to you.   You will not be charged for this Visit. Thank you for trusting us with your care.    Melia Lambert MD

## 2023-02-22 NOTE — PROGRESS NOTES
"Leeann is a 67 year old who is being evaluated via a billable telephone visit.      What phone number would you like to be contacted at? [phone]  How would you like to obtain your AVS? [place]    Distant Location (provider location):  On-site    Assessment & Plan     Bronchitis  At the onset of symptoms she was like in Lasix her throat and runny nose associated with this viral infections.  I am concerned about bacterial bronchitis versus mild walking pneumonia.  We will start her on prednisone and a Z-Esvin and albuterol.  If she still have difficulty with cough at nighttime, she will let me know and we will prescribe cough syrup for bedtime.  - azithromycin (ZITHROMAX) 250 MG tablet; Take 2 tablets (500 mg) by mouth daily for 1 day, THEN 1 tablet (250 mg) daily for 4 days.  - predniSONE (DELTASONE) 10 MG tablet; 2 tabs a day for 5 days, then 1 tab daily for 2 days, then stop  - albuterol (PROAIR HFA/PROVENTIL HFA/VENTOLIN HFA) 108 (90 Base) MCG/ACT inhaler; Inhale 2 puffs into the lungs every 6 hours as needed for shortness of breath, wheezing or cough     BMI:   Estimated body mass index is 43.66 kg/m  as calculated from the following:    Height as of 1/18/23: 1.544 m (5' 0.79\").    Weight as of 1/18/23: 104.1 kg (229 lb 7 oz).     Melia Lambert MD  [facility]    Subjective   Leeann is a 67 year old accompanied by her self, presenting for the following health issues:  Recheck Medication and URI (Shortness of breath, coughing, wheezing, sore in ribs and back. )    Leeann is a 67 year old female  with history of increased BMI, high blood pressure, osteopenia, osteoarthritis in her knees, acid reflux, colon polyps, cervical polyp, decreased hearing/aids and vitamin D deficiency.   She is currently here for telephone visit due to persistent upper respiratory symptoms.    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  Grandkids have been coughing on and off and she " started coughing about 10 days ago.  She denies any preceding sore throat runny nose or any other symptoms.  Initially symptoms were improving but 3 days ago started to get worse and she developed some wheezing at nighttime and mild shortness of breath.  She has had COVID testing several times throughout this.  Most recently yesterday was negative.  She denies any fevers or chills.  She feels tired because she cannot sleep at night due to persistent cough.  Her cough and shortness of breath are worse with lying down.  Her cough is loose and she is able to bring it up.  She denies any pleurisy but does have mild low back pain between her shoulder blades due to cough.  Her appetite is good.  She denies any sinus congestion.    No history of asthma but she does tend to develop similar symptoms annually in the winter.  Last year she had similar symptoms in April.  Required prednisone at that time.    URI         Acute Illness  Acute illness concerns: uri  Onset/Duration: past couple weeks with coughing  Symptoms:  Fever: No  Chills/Sweats: No  Headache (location?): YES  Sinus Pressure: YES  Conjunctivitis:  No  Ear Pain: no  Rhinorrhea: No  Congestion: YES  Sore Throat: No  Cough: YES - coughing up mucus  Wheeze: YES  Decreased Appetite: YES  Nausea: No  Vomiting: No  Diarrhea: No  Dysuria/Freq.: No  Dysuria or Hematuria: No  Fatigue/Achiness: YES- achiness  Sick/Strep Exposure: YES- grandkids  Therapies tried and outcome: mucinex- did not help      Review of Systems   As above      Objective           Vitals:  No vitals were obtained today due to virtual visit.    Physical Exam       Pleasant female, awake alert and oriented, able to talk and full sentences without respiratory distress shortness of breath or cough.  When she coughs, her cough is slightly mucousy slightly tight but no audible wheezing.      Telephone visit start time: 1:12 PM  Telephone visit end time: 1:22 PM  Phone call duration: 10 minutes

## 2023-02-22 NOTE — TELEPHONE ENCOUNTER
"Spoke with patient who states she \"gets this every year.\" States she has a cough and wheezing that is worse at night and with exertion. She denies any chest pain. States she scheduled the E-visit because \"they always tell me to go to urgent care and I thought this would be a better way.\" Appointment scheduled.  "

## 2023-02-22 NOTE — TELEPHONE ENCOUNTER
Pt has initiated e-visit for SOB. E visit is not appropriate for this complaint.  I have an opening at 1:00 today, please offer it to her.  This if she is unable to get here, we can also do a virtual visit at 1:00.

## 2023-03-15 ENCOUNTER — ANCILLARY PROCEDURE (OUTPATIENT)
Dept: MAMMOGRAPHY | Facility: CLINIC | Age: 68
End: 2023-03-15
Attending: INTERNAL MEDICINE
Payer: COMMERCIAL

## 2023-03-15 DIAGNOSIS — Z12.31 ENCOUNTER FOR SCREENING MAMMOGRAM FOR BREAST CANCER: ICD-10-CM

## 2023-03-15 PROCEDURE — 77067 SCR MAMMO BI INCL CAD: CPT

## 2023-04-12 ASSESSMENT — SLEEP AND FATIGUE QUESTIONNAIRES
HOW LIKELY ARE YOU TO NOD OFF OR FALL ASLEEP WHILE WATCHING TV: SLIGHT CHANCE OF DOZING
HOW LIKELY ARE YOU TO NOD OFF OR FALL ASLEEP WHEN YOU ARE A PASSENGER IN A CAR FOR AN HOUR WITHOUT A BREAK: WOULD NEVER DOZE
HOW LIKELY ARE YOU TO NOD OFF OR FALL ASLEEP WHILE SITTING INACTIVE IN A PUBLIC PLACE: WOULD NEVER DOZE
HOW LIKELY ARE YOU TO NOD OFF OR FALL ASLEEP IN A CAR, WHILE STOPPED FOR A FEW MINUTES IN TRAFFIC: WOULD NEVER DOZE
HOW LIKELY ARE YOU TO NOD OFF OR FALL ASLEEP WHILE SITTING AND READING: SLIGHT CHANCE OF DOZING
HOW LIKELY ARE YOU TO NOD OFF OR FALL ASLEEP WHILE SITTING AND TALKING TO SOMEONE: WOULD NEVER DOZE
HOW LIKELY ARE YOU TO NOD OFF OR FALL ASLEEP WHILE LYING DOWN TO REST IN THE AFTERNOON WHEN CIRCUMSTANCES PERMIT: SLIGHT CHANCE OF DOZING
HOW LIKELY ARE YOU TO NOD OFF OR FALL ASLEEP WHILE SITTING QUIETLY AFTER LUNCH WITHOUT ALCOHOL: WOULD NEVER DOZE

## 2023-04-16 ENCOUNTER — THERAPY VISIT (OUTPATIENT)
Dept: SLEEP MEDICINE | Facility: CLINIC | Age: 68
End: 2023-04-16
Payer: COMMERCIAL

## 2023-04-16 DIAGNOSIS — G47.30 SLEEP-DISORDERED BREATHING: ICD-10-CM

## 2023-04-17 NOTE — PATIENT INSTRUCTIONS
[place] [facility]    1. Your sleep study will be reviewed by a sleep physician within the next few days.     2. Please follow up in the sleep clinic as scheduled, or, make an appointment with your sleep provider to be seen within two weeks to discuss the results of the sleep study.    3. If you have any questions or problems with your treatment plan, please contact your sleep clinic provider at [phone] to further manage your condition.    4. Please review your attached medication list, and, at your follow-up appointment advise your sleep clinic provider about any changes.    5. Go to [email or web address] for more information about your sleep problems.    Rayne Tapia, RPSGT  April 17, 2023

## 2023-04-21 LAB — SLPCOMP: NORMAL

## 2023-04-27 ASSESSMENT — SLEEP AND FATIGUE QUESTIONNAIRES
HOW LIKELY ARE YOU TO NOD OFF OR FALL ASLEEP WHILE WATCHING TV: SLIGHT CHANCE OF DOZING
HOW LIKELY ARE YOU TO NOD OFF OR FALL ASLEEP WHILE SITTING INACTIVE IN A PUBLIC PLACE: WOULD NEVER DOZE
HOW LIKELY ARE YOU TO NOD OFF OR FALL ASLEEP WHILE SITTING AND READING: SLIGHT CHANCE OF DOZING
HOW LIKELY ARE YOU TO NOD OFF OR FALL ASLEEP IN A CAR, WHILE STOPPED FOR A FEW MINUTES IN TRAFFIC: WOULD NEVER DOZE
HOW LIKELY ARE YOU TO NOD OFF OR FALL ASLEEP WHILE SITTING QUIETLY AFTER LUNCH WITHOUT ALCOHOL: WOULD NEVER DOZE
HOW LIKELY ARE YOU TO NOD OFF OR FALL ASLEEP WHEN YOU ARE A PASSENGER IN A CAR FOR AN HOUR WITHOUT A BREAK: WOULD NEVER DOZE
HOW LIKELY ARE YOU TO NOD OFF OR FALL ASLEEP WHILE SITTING AND TALKING TO SOMEONE: WOULD NEVER DOZE
HOW LIKELY ARE YOU TO NOD OFF OR FALL ASLEEP WHILE LYING DOWN TO REST IN THE AFTERNOON WHEN CIRCUMSTANCES PERMIT: SLIGHT CHANCE OF DOZING

## 2023-05-01 ENCOUNTER — OFFICE VISIT (OUTPATIENT)
Dept: SLEEP MEDICINE | Facility: CLINIC | Age: 68
End: 2023-05-01
Payer: COMMERCIAL

## 2023-05-01 VITALS
HEART RATE: 64 BPM | DIASTOLIC BLOOD PRESSURE: 66 MMHG | OXYGEN SATURATION: 100 % | BODY MASS INDEX: 40.73 KG/M2 | SYSTOLIC BLOOD PRESSURE: 100 MMHG | WEIGHT: 214.06 LBS

## 2023-05-01 DIAGNOSIS — G47.33 OSA (OBSTRUCTIVE SLEEP APNEA): Primary | ICD-10-CM

## 2023-05-01 PROBLEM — I10 HYPERTENSION: Status: ACTIVE | Noted: 2021-03-08

## 2023-05-01 PROBLEM — R53.83 FATIGUE: Status: ACTIVE | Noted: 2021-03-08

## 2023-05-01 PROBLEM — D64.9 ANEMIA: Status: ACTIVE | Noted: 2021-03-08

## 2023-05-01 PROBLEM — E78.00 HYPERCHOLESTEROLEMIA: Status: ACTIVE | Noted: 2021-03-08

## 2023-05-01 PROBLEM — M12.9 ARTHROPATHY: Status: ACTIVE | Noted: 2021-03-08

## 2023-05-01 PROCEDURE — 99213 OFFICE O/P EST LOW 20 MIN: CPT | Performed by: NURSE PRACTITIONER

## 2023-05-01 NOTE — PROGRESS NOTES
Chief Complaint   Patient presents with     Study Results       Leeann Mckeon is a 67 year old female who returns to [facility] for review of her diagnostic polysomnogram she underwent on the evening of 4/16/2023.    All stages of sleep were noted with fragmentation of sleep due to respiratory events.  Total sleep time: 331.5 minutes  Total recording time: 426.0 minutes  Sleep latency: 12.5 minutes without the use of a sleep aid  REM latency: 87.0 minutes  Sleep efficiency: 77.8%  Wake after sleep onset: 82.0 minutes  Stage N1 sleep: 10.7% of total sleep time  Stage N2 sleep: 47.5%  Stage N3 sleep: 23.7%  REM sleep: 19.9%  Time in rem supine: 42.5 minutes    AHI, combined: 10.7 events/hour  AHI, REM: 37.3 events/hour  AHI, supine: 14.6 events/hour  AHI, nonsupine: 3.5 events/hour  RDI: 13.4 events/hour    SaO2, baseline: 93.3%  SaO2, kathie: 76.0%  Time spent less than/equal to 88%: 6.0 minutes    Leeann Mckeon reports that she slept Good .     Results were reviewed in detail today with Leeann and a copy given to her for her records.    Reviewed by team:   Allergies  Meds  Problems           Reviewed by provider:   Allergies  Meds  Problems               Problem List:  Patient Active Problem List    Diagnosis Date Noted     Chronic kidney disease, stage 3 10/20/2021     Priority: High     Hypertension 03/08/2021     Priority: Medium     Created by Conversion  Replacement Utility updated for latest IMO load      Formatting of this note might be different from the original.  Created by Conversion    Replacement Utility updated for latest IMO load  Formatting of this note might be different from the original.  Formatting of this note might be different from the original.  Created by Conversion    Replacement Utility updated for latest IMO load       Arthritis 03/08/2021     Priority: Medium     Created by Conversion  Replacement Utility updated for latest IMO load      Formatting of this  note might be different from the original.  Created by Conversion    Replacement Utility updated for latest IMO load  Formatting of this note might be different from the original.  Formatting of this note might be different from the original.  Created by Conversion    Replacement Utility updated for latest IMO load       Anemia 03/08/2021     Priority: Medium     Formatting of this note might be different from the original.  secondary to menorrhagia and fibroids  Last period as of 11/2012 was 5/2012  Formatting of this note might be different from the original.  secondary to menorrhagia and fibroids  Last period as of 11/2012 was 5/2012       Fatigue 03/08/2021     Priority: Medium     Hypercholesterolemia 03/08/2021     Priority: Medium     Formatting of this note might be different from the original.  LDL goal < 130.    11/2012 --  -- asked her to make an appointment in next 2 weeks.  Formatting of this note might be different from the original.  LDL goal < 130.    11/2012 --  -- asked her to make an appointment in next 2 weeks.       Hearing loss 11/11/2016     Priority: Medium     Osteopenia      Priority: Medium     Created by Conversion  Replacement Utility updated for latest IMO load         Adenomatous colon polyp 11/11/2015     Priority: Medium     Obesity      Priority: Medium     Created by Conversion         Esophageal Reflux      Priority: Medium     Created by Conversion         Menorrhagia 11/09/2010     Priority: Medium     Formatting of this note might be different from the original.  Secondary to fibroids -- was on oral contraceptives which helped.  Stopped 1/2010  Formatting of this note might be different from the original.  Secondary to fibroids -- was on oral contraceptives which helped.  Stopped 1/2010       Schwartz's esophagus 03/27/2001     Priority: Medium     Formatting of this note might be different from the original.  Referred for EGD 2010  EGD 1/2011 normal -- [place]  [facility]  Formatting of this note might be different from the original.  Referred for EGD 2010  EGD 1/2011 normal -- [facility]       Obesity 03/30/1999     Priority: Medium     Formatting of this note might be different from the original.  Created by Conversion          /66   Pulse 64   Wt 97.1 kg (214 lb 1 oz)   SpO2 100%   BMI 40.73 kg/m      Impression/Plan:  1.  Mild obstructive sleep apnea   Will begin a trial of auto titrate CPAP at 5-15 cm H2O.  Patient instructed to use minimum of 4 hours each day, 70% of the time.  Instructed that optimally patient should use 100% of sleep time.   Sleep Therapy Management Team   Comprehensive DME order placed   Patient to evaluate her sleep hygiene practices for areas which she may improve upon to minimize sleep deprivation   Weight management to BMI of 30.0 or less   Patient to employ safe driving practices including not driving an automobile should she become drowsy.   Patient to follow-up in clinic approximately 6 weeks after she receives her CPAP machine for an evaluation of efficacy and compliance.     Mild Obstructive Sleep Apnea.   Sleep associated hypoxemia was not present.         Fifteen minutes spent with patient, all of which were spent face-to-face counseling, consulting, coordinating plan of care.      ANTONIO Vera CNP  Sleep Medicine      This note was written with the assistance of the Dragon voice-dictation technology software. The final document, although reviewed, may contain errors. For corrections, please contact the office.      CC:  Melia Lambert,

## 2023-05-01 NOTE — NURSING NOTE
"Chief Complaint   Patient presents with     Study Results       Initial /66   Pulse 64   Wt 97.1 kg (214 lb 1 oz)   SpO2 100%   BMI 40.73 kg/m   Estimated body mass index is 40.73 kg/m  as calculated from the following:    Height as of 1/18/23: 1.544 m (5' 0.79\").    Weight as of this encounter: 97.1 kg (214 lb 1 oz).    Medication Reconciliation: complete    POLY Mcgill  [facility] [place]    "

## 2023-05-08 NOTE — PATIENT INSTRUCTIONS
Drive Safe... Drive Alive     Sleep health profoundly affects your health, mood, and your safety. 33% of the population (one in three of us) is not getting enough sleep and many have a sleep disorder. Not getting enough sleep or having an untreated / undertreated sleep condition may make us sleepy without even knowing it. In fact, our driving could be dramatically impaired due to our sleep health. As your provider, here are some things I would like you to know about driving:     Here are some warning signs for impairment and dangerous drowsy driving:              -Having been awake more than 16 hours               -Looking tired               -Eyelid drooping              -Head nodding (it could be too late at this point)              -Driving for more than 30 minutes     Some things you could do to make the driving safer if you are experiencing some drowsiness:              -Stop driving and rest              -Call for transportation              -Make sure your sleep disorder is adequately treated     Some things that have been shown NOT to work when experiencing drowsiness while driving:              -Turning on the radio              -Opening windows              -Eating any  distracting  /  entertaining  foods (e.g., sunflower seeds, candy, or any other)              -Talking on the phone      Your decision may not only impact your life, but also the life of others. Please, remember to drive safe for yourself and all of us.        Your BMI is Body mass index is 40.73 kg/m .    What is BMI?  Body mass index (BMI) is one way to tell whether you are at a healthy weight, overweight, or obese. It measures your weight in relation to your height.  A BMI of 18.5 to 24.9 is in the healthy range. A person with a BMI of 25 to 29.9 is considered overweight, and someone with a BMI of 30 or greater is considered obese.  Another way to find out if you are at risk for health problems caused by overweight and obesity is to  measure your waist. If you are a woman and your waist is more than 35 inches, or if you are a man and your waist is more than 40 inches, your risk of disease may be higher.  More than two-thirds of American adults are considered overweight or obese. Being overweight or obese increases the risk for further weight gain.  Excess weight may lead to heart disease and diabetes. Creating and following plans for healthy eating and physical activity may help you improve your health.    Methods for maintaining or losing weight.  Weight control is part of healthy lifestyle and includes exercise, emotional health, and healthy eating habits.  Careful eating habits lifelong is the mainstay of weight control.  Though there are significant health benefits from weight loss, long-term weight loss with diet alone may be very difficult to achieve- studies show long-term success with dietary management in less than 10% of people. Attaining a healthy weight may be especially difficult to achieve in those with severe obesity. In some cases, medications, devices and surgical management might be considered.    What can you do?  If you are overweight or obese and are interested in methods for weight loss, you should discuss this with your provider. In addition, we recommend that you review healthy life styles and methods for weight loss available through the National Institutes of Health patient information sites:   http://win.niddk.nih.gov/publications/index.htm

## 2023-05-17 DIAGNOSIS — K21.9 GASTROESOPHAGEAL REFLUX DISEASE WITHOUT ESOPHAGITIS: ICD-10-CM

## 2023-05-18 NOTE — TELEPHONE ENCOUNTER
"Last Written Prescription Date:  8/25/22  Last Fill Quantity: 90,  # refills: 2   Last office visit provider:  2/22/23     Requested Prescriptions   Pending Prescriptions Disp Refills     omeprazole (PRILOSEC) 20 MG DR capsule [Pharmacy Med Name: OMEPRAZOLE 20 MG CPDR 20 Capsule] 90 capsule 2     Sig: TAKE 1 CAPSULE (20 MG) BY MOUTH DAILY       PPI Protocol Passed - 5/17/2023  8:04 AM        Passed - Not on Clopidogrel (unless Pantoprazole ordered)        Passed - No diagnosis of osteoporosis on record        Passed - Recent (12 mo) or future (30 days) visit within the authorizing provider's specialty     Patient has had an office visit with the authorizing provider or a provider within the authorizing providers department within the previous 12 mos or has a future within next 30 days. See \"Patient Info\" tab in inbasket, or \"Choose Columns\" in Meds & Orders section of the refill encounter.              Passed - Medication is active on med list        Passed - Patient is age 18 or older        Passed - No active pregnacy on record        Passed - No positive pregnancy test in past 12 months             Yanely Pan, RN 05/17/23 9:36 PM  "

## 2023-05-24 ENCOUNTER — DOCUMENTATION ONLY (OUTPATIENT)
Dept: SLEEP MEDICINE | Facility: CLINIC | Age: 68
End: 2023-05-24
Payer: COMMERCIAL

## 2023-05-24 DIAGNOSIS — G47.33 OBSTRUCTIVE SLEEP APNEA (ADULT) (PEDIATRIC): Primary | ICD-10-CM

## 2023-05-24 NOTE — PROGRESS NOTES
Patient was offered choice of vendor and chose [facility].  Patient Leeann Mckeon was set up at [place] on May 22, 2023. Patient received a Resmed Airsense 10 Pressures were set at 5-15 CMH2O.   Patient received a Resmed Mask name: AIRTOUCH N20  Nasal mask size Large, heated tubing and heated humidifier.  Patient has the following compliance requirements: using and visit requirements  Patient has a follow up on TBD with Yanely Oates CNP.    Gloria Martins, RT

## 2023-05-30 ENCOUNTER — DOCUMENTATION ONLY (OUTPATIENT)
Dept: SLEEP MEDICINE | Facility: CLINIC | Age: 68
End: 2023-05-30
Payer: COMMERCIAL

## 2023-05-31 NOTE — PROGRESS NOTES
3 day Sleep therapy management telephone visit    Diagnostic AHI: 10.7  PSG    Confirmed with patient at time of call- Yes Patient is still interested in STM service       Subjective measures:  Patient states she is going to [facility] to do a mask exchange.  Patient feels like she is sleeping less now than without CPAP.  Patient looking to get a nasal pillow mask.         Objective data     Order Settings for PAP  CPAP min     CPAP max              Device settings from machine      CPAP max 5    CPAP fixed 15               Assessment: Nighty usage most nights over four hours      Action plan: Patient to have 14 day STM visit. Patient has a follow up visit scheduled:   no    Replacement device: No  STM ordered by provider: Yes     Total time spent on accessing and  interpreting remote patient PAP therapy data  10 minutes    Total time spent counseling, coaching  and reviewing PAP therapy data with patient  9 minutes    [id number] no

## 2023-06-08 ENCOUNTER — DOCUMENTATION ONLY (OUTPATIENT)
Dept: SLEEP MEDICINE | Facility: CLINIC | Age: 68
End: 2023-06-08
Payer: COMMERCIAL

## 2023-06-08 NOTE — PROGRESS NOTES
14  DAY STM VISIT    Diagnostic AHI: 10.7  PSG    Data only recheck     Assessment: Pt meeting objective benchmarks.       Action plan: pt to have 30 day STM visit.      Device type: Auto-CPAP    PAP settings: CPAP min 5.0 cm  H20       CPAP max 15.0 cm  H20      95th% pressure 8.8 cm  H20        RESMED EPR level Setting: TWO    RESMED Soft response setting:  OFF    Mask type:  Nasal Mask    Objective measures: 14 day rolling measures      Compliance  78 %      Leak  22.88  lpm  last  upload      AHI 3.59   last  upload      Average number of minutes 308      Objective measure goal  Compliance   Goal >70%  Leak   Goal < 24 lpm  AHI  Goal < 5  Usage  Goal >240        Total time spent on accessing and interpreting remote patient PAP therapy data  10 minutes    Total time spent counseling, coaching  and reviewing PAP therapy data with patient  0 minutes    [id number]  [id number]  no (3 day STM)

## 2023-10-23 ASSESSMENT — ENCOUNTER SYMPTOMS
NERVOUS/ANXIOUS: 0
ARTHRALGIAS: 0
HEMATOCHEZIA: 0
BREAST MASS: 0
COUGH: 0
JOINT SWELLING: 0
CONSTIPATION: 0
DIARRHEA: 0
HEARTBURN: 0
EYE PAIN: 0
FREQUENCY: 0
SORE THROAT: 0
HEADACHES: 0
PARESTHESIAS: 0
SHORTNESS OF BREATH: 0
DYSURIA: 0
PALPITATIONS: 0
NAUSEA: 0
HEMATURIA: 0
MYALGIAS: 0
FEVER: 0
CHILLS: 0
DIZZINESS: 0
ABDOMINAL PAIN: 0

## 2023-10-23 ASSESSMENT — ACTIVITIES OF DAILY LIVING (ADL): CURRENT_FUNCTION: NO ASSISTANCE NEEDED

## 2023-10-23 ASSESSMENT — ASTHMA QUESTIONNAIRES: ACT_TOTALSCORE: 25

## 2023-10-26 ENCOUNTER — OFFICE VISIT (OUTPATIENT)
Dept: INTERNAL MEDICINE | Facility: CLINIC | Age: 68
End: 2023-10-26
Payer: COMMERCIAL

## 2023-10-26 VITALS
HEART RATE: 87 BPM | BODY MASS INDEX: 43.45 KG/M2 | OXYGEN SATURATION: 96 % | WEIGHT: 221.3 LBS | HEIGHT: 60 IN | DIASTOLIC BLOOD PRESSURE: 64 MMHG | TEMPERATURE: 98.7 F | SYSTOLIC BLOOD PRESSURE: 103 MMHG | RESPIRATION RATE: 16 BRPM

## 2023-10-26 DIAGNOSIS — E53.8 VITAMIN B12 DEFICIENCY (NON ANEMIC): ICD-10-CM

## 2023-10-26 DIAGNOSIS — K21.00 GASTROESOPHAGEAL REFLUX DISEASE WITH ESOPHAGITIS WITHOUT HEMORRHAGE: ICD-10-CM

## 2023-10-26 DIAGNOSIS — E55.9 VITAMIN D DEFICIENCY: ICD-10-CM

## 2023-10-26 DIAGNOSIS — E78.00 HYPERCHOLESTEROLEMIA: ICD-10-CM

## 2023-10-26 DIAGNOSIS — I10 PRIMARY HYPERTENSION: ICD-10-CM

## 2023-10-26 DIAGNOSIS — Z78.0 POST-MENOPAUSAL: ICD-10-CM

## 2023-10-26 DIAGNOSIS — N18.31 STAGE 3A CHRONIC KIDNEY DISEASE (H): ICD-10-CM

## 2023-10-26 DIAGNOSIS — Z00.00 ENCOUNTER FOR MEDICARE ANNUAL WELLNESS EXAM: Primary | ICD-10-CM

## 2023-10-26 DIAGNOSIS — G47.33 OSA (OBSTRUCTIVE SLEEP APNEA): ICD-10-CM

## 2023-10-26 LAB
ALBUMIN SERPL BCG-MCNC: 4.2 G/DL (ref 3.5–5.2)
ALP SERPL-CCNC: 81 U/L (ref 35–104)
ALT SERPL W P-5'-P-CCNC: 13 U/L (ref 0–50)
ANION GAP SERPL CALCULATED.3IONS-SCNC: 13 MMOL/L (ref 7–15)
AST SERPL W P-5'-P-CCNC: 17 U/L (ref 0–45)
BASOPHILS # BLD AUTO: 0 10E3/UL (ref 0–0.2)
BASOPHILS NFR BLD AUTO: 0 %
BILIRUB SERPL-MCNC: 0.5 MG/DL
BUN SERPL-MCNC: 23.8 MG/DL (ref 8–23)
CALCIUM SERPL-MCNC: 10.2 MG/DL (ref 8.8–10.2)
CHLORIDE SERPL-SCNC: 104 MMOL/L (ref 98–107)
CHOLEST SERPL-MCNC: 220 MG/DL
CREAT SERPL-MCNC: 1.13 MG/DL (ref 0.51–0.95)
CREAT UR-MCNC: 221 MG/DL
DEPRECATED HCO3 PLAS-SCNC: 23 MMOL/L (ref 22–29)
EGFRCR SERPLBLD CKD-EPI 2021: 53 ML/MIN/1.73M2
EOSINOPHIL # BLD AUTO: 0.1 10E3/UL (ref 0–0.7)
EOSINOPHIL NFR BLD AUTO: 2 %
ERYTHROCYTE [DISTWIDTH] IN BLOOD BY AUTOMATED COUNT: 12.5 % (ref 10–15)
GLUCOSE SERPL-MCNC: 92 MG/DL (ref 70–99)
HCT VFR BLD AUTO: 37 % (ref 35–47)
HDLC SERPL-MCNC: 70 MG/DL
HGB BLD-MCNC: 12.1 G/DL (ref 11.7–15.7)
IMM GRANULOCYTES # BLD: 0 10E3/UL
IMM GRANULOCYTES NFR BLD: 0 %
LDLC SERPL CALC-MCNC: 115 MG/DL
LYMPHOCYTES # BLD AUTO: 2.1 10E3/UL (ref 0.8–5.3)
LYMPHOCYTES NFR BLD AUTO: 32 %
MCH RBC QN AUTO: 31.2 PG (ref 26.5–33)
MCHC RBC AUTO-ENTMCNC: 32.7 G/DL (ref 31.5–36.5)
MCV RBC AUTO: 95 FL (ref 78–100)
MICROALBUMIN UR-MCNC: 18.1 MG/L
MICROALBUMIN/CREAT UR: 8.19 MG/G CR (ref 0–25)
MONOCYTES # BLD AUTO: 0.5 10E3/UL (ref 0–1.3)
MONOCYTES NFR BLD AUTO: 7 %
NEUTROPHILS # BLD AUTO: 3.9 10E3/UL (ref 1.6–8.3)
NEUTROPHILS NFR BLD AUTO: 59 %
NONHDLC SERPL-MCNC: 150 MG/DL
PLATELET # BLD AUTO: 250 10E3/UL (ref 150–450)
POTASSIUM SERPL-SCNC: 3.9 MMOL/L (ref 3.4–5.3)
PROT SERPL-MCNC: 7.3 G/DL (ref 6.4–8.3)
RBC # BLD AUTO: 3.88 10E6/UL (ref 3.8–5.2)
SODIUM SERPL-SCNC: 140 MMOL/L (ref 135–145)
TRIGL SERPL-MCNC: 174 MG/DL
TSH SERPL DL<=0.005 MIU/L-ACNC: 1.01 UIU/ML (ref 0.3–4.2)
VIT B12 SERPL-MCNC: 981 PG/ML (ref 232–1245)
VIT D+METAB SERPL-MCNC: 44 NG/ML (ref 20–50)
WBC # BLD AUTO: 6.6 10E3/UL (ref 4–11)

## 2023-10-26 PROCEDURE — 85025 COMPLETE CBC W/AUTO DIFF WBC: CPT | Performed by: INTERNAL MEDICINE

## 2023-10-26 PROCEDURE — 90662 IIV NO PRSV INCREASED AG IM: CPT | Performed by: INTERNAL MEDICINE

## 2023-10-26 PROCEDURE — 84443 ASSAY THYROID STIM HORMONE: CPT | Performed by: INTERNAL MEDICINE

## 2023-10-26 PROCEDURE — 82306 VITAMIN D 25 HYDROXY: CPT | Performed by: INTERNAL MEDICINE

## 2023-10-26 PROCEDURE — 99214 OFFICE O/P EST MOD 30 MIN: CPT | Mod: 25 | Performed by: INTERNAL MEDICINE

## 2023-10-26 PROCEDURE — 91320 SARSCV2 VAC 30MCG TRS-SUC IM: CPT | Performed by: INTERNAL MEDICINE

## 2023-10-26 PROCEDURE — 90480 ADMN SARSCOV2 VAC 1/ONLY CMP: CPT | Performed by: INTERNAL MEDICINE

## 2023-10-26 PROCEDURE — G0008 ADMIN INFLUENZA VIRUS VAC: HCPCS | Performed by: INTERNAL MEDICINE

## 2023-10-26 PROCEDURE — G0438 PPPS, INITIAL VISIT: HCPCS | Performed by: INTERNAL MEDICINE

## 2023-10-26 PROCEDURE — 82607 VITAMIN B-12: CPT | Performed by: INTERNAL MEDICINE

## 2023-10-26 PROCEDURE — 82043 UR ALBUMIN QUANTITATIVE: CPT | Performed by: INTERNAL MEDICINE

## 2023-10-26 PROCEDURE — 36415 COLL VENOUS BLD VENIPUNCTURE: CPT | Performed by: INTERNAL MEDICINE

## 2023-10-26 PROCEDURE — 80061 LIPID PANEL: CPT | Performed by: INTERNAL MEDICINE

## 2023-10-26 PROCEDURE — 80053 COMPREHEN METABOLIC PANEL: CPT | Performed by: INTERNAL MEDICINE

## 2023-10-26 PROCEDURE — 82570 ASSAY OF URINE CREATININE: CPT | Performed by: INTERNAL MEDICINE

## 2023-10-26 RX ORDER — RESPIRATORY SYNCYTIAL VIRUS VACCINE 120MCG/0.5
0.5 KIT INTRAMUSCULAR ONCE
Qty: 1 EACH | Refills: 0 | Status: CANCELLED | OUTPATIENT
Start: 2023-10-26 | End: 2023-10-26

## 2023-10-26 ASSESSMENT — ENCOUNTER SYMPTOMS
PARESTHESIAS: 0
BREAST MASS: 0
CONSTIPATION: 0
NERVOUS/ANXIOUS: 0
DIARRHEA: 0
DIZZINESS: 0
FEVER: 0
EYE PAIN: 0
SORE THROAT: 0
HEARTBURN: 0
HEMATOCHEZIA: 0
ARTHRALGIAS: 0
PALPITATIONS: 0
NAUSEA: 0
CHILLS: 0
COUGH: 0
FREQUENCY: 0
HEADACHES: 0
MYALGIAS: 0
DYSURIA: 0
SHORTNESS OF BREATH: 0
ABDOMINAL PAIN: 0
HEMATURIA: 0
JOINT SWELLING: 0

## 2023-10-26 ASSESSMENT — ACTIVITIES OF DAILY LIVING (ADL): CURRENT_FUNCTION: NO ASSISTANCE NEEDED

## 2023-10-26 NOTE — PROGRESS NOTES
SUBJECTIVE:   Leeann is a 68 year old who presents for Preventive Visit.      10/26/2023    10:46 AM   Additional Questions   Roomed by rich   Accompanied by alone         10/26/2023    10:46 AM   Patient Reported Additional Medications   Patient reports taking the following new medications none       Are you in the first 12 months of your Medicare coverage?  No    Leeann is a 68 year old female  with history of increased BMI, high blood pressure, osteopenia, osteoarthritis in her knees, acid reflux, colon polyps, cervical polyp, decreased hearing/aids and vitamin D deficiency.    She is currently here for wellness exam.    She continues to deal with his knee and hand arthritis.  She takes Tylenol 500-2000 mg a day as needed.  She has had steroid injections in the past with Ortho but they did not help.  She does move a lot out a lot and has 4 grandchildren which she helps to take care off.  She has not needed meloxicam recently and knows to take it sparingly due to renal insufficiency.  She has been doing cheer Frandy and stretches and range of motion in her knees has improved.  Currently she is not ready for knee replacement surgery yet.    Sometimes when she stands for too long she develops numbness and tingling in both of her legs but denies those symptoms when she walks or when she is in bed.    She takes B12, vitamin D and multivitamin daily.    She has improved her diet and lost 10 pounds since last year.  She does not drink pop anymore and eats a lot of fruits and vegetables.    She has mild seasonal allergies but no persistent cough or wheezing.    She goes to [facility] for yearly eye exam and is due for checkup there.    For hearing deficiency she using hearing aids and they work well.    She does have occasional problems with balance and uses a cane but denies any recent falls.    She has history of sleep apnea but has not been using CPAP because it has not been comfortable, she has follow-up scheduled  "with sleep clinic in a few months.    Healthy Habits:     In general, how would you rate your overall health?  Good    Frequency of exercise:  2-3 days/week    Duration of exercise:  30-45 minutes    Do you usually eat at least 4 servings of fruit and vegetables a day, include whole grains    & fiber and avoid regularly eating high fat or \"junk\" foods?  No    Taking medications regularly:  Yes    Medication side effects:  Not applicable    Ability to successfully perform activities of daily living:  No assistance needed    Home Safety:  No safety concerns identified    Hearing Impairment:  Difficulty following a conversation in a noisy restaurant or crowded room and need to ask people to speak up or repeat themselves    In the past 6 months, have you been bothered by leaking of urine?  No    In general, how would you rate your overall mental or emotional health?  Good    Additional concerns today:  No      Today's PHQ-2 Score:       10/26/2023    10:34 AM   PHQ-2 ( 1999 [employer])   Q1: Little interest or pleasure in doing things 0   Q2: Feeling down, depressed or hopeless 0   PHQ-2 Score 0   Q1: Little interest or pleasure in doing things Not at all   Q2: Feeling down, depressed or hopeless Not at all   PHQ-2 Score 0           Have you ever done Advance Care Planning? (For example, a Health Directive, POLST, or a discussion with a medical provider or your loved ones about your wishes): Yes, advance care planning is on file.    Fall risk  Fallen 2 or more times in the past year?: No  Any fall with injury in the past year?: No    Cognitive Screening   1) Repeat 3 items (Leader, Season, Table)    2) Clock draw: NORMAL  3) 3 item recall: Recalls 3 objects  Results: 3 items recalled: COGNITIVE IMPAIRMENT LESS LIKELY    Mini-CogTM Copyright S Tong. Licensed by the author for use in [facility]; reprinted with permission (nam@.[facility] [place]). All rights reserved.      Do you have sleep apnea, excessive snoring or " daytime drowsiness? : yes    Reviewed and updated as needed this visit by clinical staff   Tobacco  Allergies  Meds              Reviewed and updated as needed this visit by Provider                 Social History     Tobacco Use    Smoking status: Never    Smokeless tobacco: Never   Substance Use Topics    Alcohol use: Yes     Comment: Alcoholic Drinks/day: socially           10/23/2023     9:17 PM   Alcohol Use   Prescreen: >3 drinks/day or >7 drinks/week? No     Do you have a current opioid prescription? No  Do you use any other controlled substances or medications that are not prescribed by a provider? None    Current providers sharing in care for this patient include: Patient Care Team:  Melia Lambert MD as PCP - General (Internal Medicine)  Melia Lambert MD as Assigned PCP    The following health maintenance items are reviewed in Epic and correct as of today:  Health Maintenance   Topic Date Due    DEXA  Never done    ANNUAL REVIEW OF  ORDERS  Never done    ASTHMA ACTION PLAN  Never done    Pneumococcal Vaccine: 65+ Years (1 - PCV) Never done    ZOSTER IMMUNIZATION (1 of 2) Never done    RSV VACCINE 60+ (1 - 1-dose 60+ series) Never done    INFLUENZA VACCINE (1) 09/01/2023    COVID-19 Vaccine (5 - 2023-24 season) 09/01/2023    BMP  10/19/2023    LIPID  10/19/2023    MICROALBUMIN  10/19/2023    MEDICARE ANNUAL WELLNESS VISIT  10/19/2023    HEMOGLOBIN  10/19/2023    ASTHMA CONTROL TEST  04/26/2024    FALL RISK ASSESSMENT  10/26/2024    MAMMO SCREENING  03/15/2025    COLORECTAL CANCER SCREENING  02/16/2026    ADVANCE CARE PLANNING  10/19/2027    DTAP/TDAP/TD IMMUNIZATION (4 - Td or Tdap) 11/11/2029    PHQ-2 (once per calendar year)  Completed    URINALYSIS  Completed    IPV IMMUNIZATION  Aged Out    HPV IMMUNIZATION  Aged Out    MENINGITIS IMMUNIZATION  Aged Out    HEPATITIS C SCREENING  Discontinued    PAP  Discontinued     FHS-7:       3/23/2022    10:30 AM 10/12/2022    10:12 AM 3/15/2023     11:19 AM 10/23/2023     9:25 PM   Breast CA Risk Assessment (FHS-7)   Did any of your first-degree relatives have breast or ovarian cancer? No No No No   Did any of your relatives have bilateral breast cancer? No No No No   Did any man in your family have breast cancer? No No No No   Did any woman in your family have breast and ovarian cancer? No No No No   Did any woman in your family have breast cancer before age 50 y? No  No No   Do you have 2 or more relatives with breast and/or ovarian cancer? No No No No   Do you have 2 or more relatives with breast and/or bowel cancer? No No No Yes     Pertinent mammograms are reviewed under the imaging tab.    Review of Systems   Constitutional:  Negative for chills and fever.   HENT:  Negative for congestion, ear pain, hearing loss and sore throat.    Eyes:  Negative for pain and visual disturbance.   Respiratory:  Negative for cough and shortness of breath.    Cardiovascular:  Negative for chest pain, palpitations and peripheral edema.   Gastrointestinal:  Negative for abdominal pain, constipation, diarrhea, heartburn, hematochezia and nausea.   Breasts:  Negative for tenderness, breast mass and discharge.   Genitourinary:  Negative for dysuria, frequency, genital sores, hematuria, pelvic pain, urgency, vaginal bleeding and vaginal discharge.   Musculoskeletal:  Negative for arthralgias, joint swelling and myalgias.   Skin:  Negative for rash.   Neurological:  Negative for dizziness, headaches and paresthesias.   Psychiatric/Behavioral:  Negative for mood changes. The patient is not nervous/anxious.    Any abdominal pain constipation diarrhea blood in the stool, she denies any dysphagia, acid reflux is controlled on PPI.    OBJECTIVE:   /64 (BP Location: Left arm, Patient Position: Sitting, Cuff Size: Adult Large)   Pulse 87   Temp 98.7  F (37.1  C) (Tympanic)   Resp 16   Ht 1.524 m (5')   Wt 100.4 kg (221 lb 4.8 oz)   LMP  (LMP Unknown)   SpO2 96%    Breastfeeding No   BMI 43.22 kg/m   Estimated body mass index is 43.22 kg/m  as calculated from the following:    Height as of this encounter: 1.524 m (5').    Weight as of this encounter: 100.4 kg (221 lb 4.8 oz).  Physical Exam  General: well appearing female, alert and oriented x3  EYES: Eyelids, conjunctiva, and sclera were normal. Pupils were normal.   HEAD, EARS, NOSE, MOUTH, AND THROAT: no cervical LAD, no thyromegaly or nodules appreciated. TMs are visualized and normal, oropharynx is clear, slightly crowded.  RESPIRATORY: respirations non labored, CTA bl, no wheezes, rales, no forced expiratory wheezing.  CARDIOVASCULAR: Heart rate and rhythm were normal. No murmurs, rubs,gallops. There was no peripheral edema. No carotid bruits.  GASTROINTESTINAL: Positive bowel sounds, abdomen is soft, non tender, non distended.     MUSCULOSKELETAL: Muscle mass was normal for age. No joint synovitis or deformity.  LYMPHATIC: There were no enlarged nodes palpable.  SKIN/HAIR/NAILS: Skin color was normal.  No rashes.  NEUROLOGIC: The patient was alert and oriented.  Speech was normal.  There is no facial asymmetry.   PSYCHIATRIC:  Mood and affect were normal.   Breast exam: No axilla lymphadenopathy, breast masses or skin changes appreciated.    ASSESSMENT / PLAN:   Leeann was seen today for wellness visit and recheck medication.    Diagnoses and all orders for this visit:    Encounter for Medicare annual wellness exam  We will do fasting blood work today, she is not due for repeat mammogram until April, discussed bone density testing.  She will continue with weight loss.  Flu and COVID vaccines were administered today, discussed to get shingles, pneumococcal 20 and RSV vaccine at the pharmacy.    Primary hypertension  Well-controlled on lisinopril-hydrochlorothiazide.  -     Comprehensive metabolic panel  -     TSH with free T4 reflex    Stage 3a chronic kidney disease (H)  She takes meloxicam infrequently  -     Albumin  Random Urine Quantitative with Creat Ratio  -     Comprehensive metabolic panel    Hypercholesterolemia  She is on Crestor 10 mg a day  -     Lipid panel reflex to direct LDL Non-fasting  -     Comprehensive metabolic panel  -     TSH with free T4 reflex    Body mass index (BMI) 40.0-45 adult (H)  She cut out pop and sugar and lost 10 pounds, will continue with weight loss.    Vitamin B12 deficiency (non anemic)  She is on a supplement  -     CBC with platelets and differential  -     Vitamin B12    Vitamin D deficiency  -     Vitamin D Deficiency    Post-menopausal  -     DEXA HIP/PELVIS/SPINE - Future; Future    SG (obstructive sleep apnea)  CPAP has not been comfortable, she schedule follow-up with sleep clinic later this year.    Gastroesophageal reflux disease with esophagitis without hemorrhage  Well-controlled on daily PPI.  Of note she had last endoscopy done in 2011, at that time due to esophageal visual mucosal abnormality biopsies were done but were negative for Schwartz's.  She denies any dysphagia.    Other orders  -     INFLUENZA VACCINE 65+ (FLUZONE HD)  -     COVID-19 12+ (2023-24) (PFIZER)  -     PRIMARY CARE FOLLOW-UP SCHEDULING; Future      BMI:   Estimated body mass index is 43.22 kg/m  as calculated from the following:    Height as of this encounter: 1.524 m (5').    Weight as of this encounter: 100.4 kg (221 lb 4.8 oz).         She reports that she has never smoked. She has never used smokeless tobacco.      Appropriate preventive services were discussed with this patient, including applicable screening as appropriate for fall prevention, nutrition, physical activity, Tobacco-use cessation, weight loss and cognition.  Checklist reviewing preventive services available has been given to the patient.    Reviewed patients plan of care and provided an AVS. The Basic Care Plan (routine screening as documented in Health Maintenance) for Leeann meets the Care Plan requirement. This Care Plan has been  established and reviewed with the Patient.    Melia Lambert MD  [facility]    Identified Health Risks:

## 2023-10-26 NOTE — PROGRESS NOTES
The patient was counseled and encouraged to consider modifying their diet and eating habits. She was provided with information on recommended healthy diet options.  The patient was provided with written information regarding signs of hearing loss.

## 2023-10-26 NOTE — PATIENT INSTRUCTIONS
1 Covid vaccine and flu shot today    2. Get RSV and Pneumonia 20 vaccine at the pharmacy. Also shingles vaccine ( 2 shots)    3. Schedule bone density    4. Mammogram is due in April 5. Follow up with sleep specialist for SG    Patient Education   Personalized Prevention Plan  You are due for the preventive services outlined below.  Your care team is available to assist you in scheduling these services.  If you have already completed any of these items, please share that information with your care team to update in your medical record.  Health Maintenance Due   Topic Date Due    Osteoporosis Screening  Never done    ANNUAL REVIEW OF HM ORDERS  Never done    Asthma Action Plan - yearly  Never done    Pneumococcal Vaccine (1 - PCV) Never done    Zoster (Shingles) Vaccine (1 of 2) Never done    RSV VACCINE 60+ (1 - 1-dose 60+ series) Never done    Flu Vaccine (1) 09/01/2023    COVID-19 Vaccine (5 - 2023-24 season) 09/01/2023    Basic Metabolic Panel  10/19/2023    Cholesterol Lab  10/19/2023    Kidney Microalbumin Urine Test  10/19/2023    Hemoglobin  10/19/2023     Learning About Dietary Guidelines  What are the Dietary Guidelines for Americans?     Dietary Guidelines for Americans provide tips for eating well and staying healthy. This helps reduce the risk for long-term (chronic) diseases.  These guidelines recommend that you:  Eat and drink the right amount for you. The U.S. government's food guide is called MyPlate. It can help you make your own well-balanced eating plan.  Try to balance your eating with your activity. This helps you stay at a healthy weight.  Drink alcohol in moderation, if at all.  Limit foods high in salt, saturated fat, trans fat, and added sugar.  These guidelines are from the U.S. Department of Agriculture and the U.S. Department of Health and Human Services. They are updated every 5 years.  What is MyPlate?  MyPlate is the U.S. government's food guide. It can help you make your own  "well-balanced eating plan. A balanced eating plan means that you eat enough, but not too much, and that your food gives you the nutrients you need to stay healthy.  MyPlate focuses on eating plenty of whole grains, fruits, and vegetables, and on limiting fat and sugar. It is available online at www.ChooseMyPlate.gov.  How can you get started?  If you're trying to eat healthier, you can slowly change your eating habits over time. You don't have to make big changes all at once. Start by adding one or two healthy foods to your meals each day.  Grains  Choose whole-grain breads and cereals and whole-wheat pasta and whole-grain crackers.  Vegetables  Eat a variety of vegetables every day. They have lots of nutrients and are part of a heart-healthy diet.  Fruits  Eat a variety of fruits every day. Fruits contain lots of nutrients. Choose fresh fruit instead of fruit juice.  Protein foods  Choose fish and lean poultry more often. Eat red meat and fried meats less often. Dried beans, tofu, and nuts are also good sources of protein.  Dairy  Choose low-fat or fat-free products from this food group. If you have problems digesting milk, try eating cheese or yogurt instead.  Fats and oils  Limit fats and oils if you're trying to cut calories. Choose healthy fats when you cook. These include canola oil and olive oil.  Where can you learn more?  Go to https://www.[employer].tv.net/patiented  Enter D676 in the search box to learn more about \"Learning About Dietary Guidelines.\"  Current as of: March 1, 2023               Content Version: 13.7    6799-7527 [employer].   Care instructions adapted under license by your healthcare professional. If you have questions about a medical condition or this instruction, always ask your healthcare professional. [employer] disclaims any warranty or liability for your use of this information.      Hearing Loss: Care Instructions  Overview     Hearing loss is a sudden or " slow decrease in how well you hear. It can range from slight to profound. Permanent hearing loss can occur with aging. It also can happen when you are exposed long-term to loud noise. Examples include listening to loud music, riding motorcycles, or being around other loud machines.  Hearing loss can affect your work and home life. It can make you feel lonely or depressed. You may feel that you have lost your independence. But hearing aids and other devices can help you hear better and feel connected to others.  Follow-up care is a key part of your treatment and safety. Be sure to make and go to all appointments, and call your doctor if you are having problems. It's also a good idea to know your test results and keep a list of the medicines you take.  How can you care for yourself at home?  Avoid loud noises whenever possible. This helps keep your hearing from getting worse.  Always wear hearing protection around loud noises.  Wear a hearing aid as directed.  A professional can help you pick a hearing aid that will work best for you.  You can also get hearing aids over the counter for mild to moderate hearing loss.  Have hearing tests as your doctor suggests. They can show whether your hearing has changed. Your hearing aid may need to be adjusted.  Use other devices as needed. These may include:  Telephone amplifiers and hearing aids that can connect to a television, stereo, radio, or microphone.  Devices that use lights or vibrations. These alert you to the doorbell, a ringing telephone, or a baby monitor.  Television closed-captioning. This shows the words at the bottom of the screen. Most new TVs can do this.  TTY (text telephone). This lets you type messages back and forth on the telephone instead of talking or listening. These devices are also called TDD. When messages are typed on the keyboard, they are sent over the phone line to a receiving TTY. The message is shown on a monitor.  Use text messaging, social  "media, and email if it is hard for you to communicate by telephone.  Try to learn a listening technique called speechreading. It is not lipreading. You pay attention to people's gestures, expressions, posture, and tone of voice. These clues can help you understand what a person is saying. Face the person you are talking to, and have them face you. Make sure the lighting is good. You need to see the other person's face clearly.  Think about counseling if you need help to adjust to your hearing loss.  When should you call for help?  Watch closely for changes in your health, and be sure to contact your doctor if:    You think your hearing is getting worse.     You have new symptoms, such as dizziness or nausea.   Where can you learn more?  Go to https://www.[employer].net/patiented  Enter R798 in the search box to learn more about \"Hearing Loss: Care Instructions.\"  Current as of: March 1, 2023               Content Version: 13.7    9130-9860 [employer].   Care instructions adapted under license by your healthcare professional. If you have questions about a medical condition or this instruction, always ask your healthcare professional. [employer] disclaims any warranty or liability for your use of this information.         "

## 2023-11-06 ENCOUNTER — PATIENT OUTREACH (OUTPATIENT)
Dept: GASTROENTEROLOGY | Facility: CLINIC | Age: 68
End: 2023-11-06
Payer: COMMERCIAL

## 2023-12-03 ASSESSMENT — SLEEP AND FATIGUE QUESTIONNAIRES
HOW LIKELY ARE YOU TO NOD OFF OR FALL ASLEEP WHILE SITTING QUIETLY AFTER LUNCH WITHOUT ALCOHOL: WOULD NEVER DOZE
HOW LIKELY ARE YOU TO NOD OFF OR FALL ASLEEP WHILE WATCHING TV: SLIGHT CHANCE OF DOZING
HOW LIKELY ARE YOU TO NOD OFF OR FALL ASLEEP WHEN YOU ARE A PASSENGER IN A CAR FOR AN HOUR WITHOUT A BREAK: WOULD NEVER DOZE
HOW LIKELY ARE YOU TO NOD OFF OR FALL ASLEEP WHILE SITTING AND TALKING TO SOMEONE: WOULD NEVER DOZE
HOW LIKELY ARE YOU TO NOD OFF OR FALL ASLEEP WHILE LYING DOWN TO REST IN THE AFTERNOON WHEN CIRCUMSTANCES PERMIT: SLIGHT CHANCE OF DOZING
HOW LIKELY ARE YOU TO NOD OFF OR FALL ASLEEP WHILE SITTING INACTIVE IN A PUBLIC PLACE: WOULD NEVER DOZE
HOW LIKELY ARE YOU TO NOD OFF OR FALL ASLEEP IN A CAR, WHILE STOPPED FOR A FEW MINUTES IN TRAFFIC: WOULD NEVER DOZE
HOW LIKELY ARE YOU TO NOD OFF OR FALL ASLEEP WHILE SITTING AND READING: SLIGHT CHANCE OF DOZING

## 2023-12-06 ENCOUNTER — OFFICE VISIT (OUTPATIENT)
Dept: SLEEP MEDICINE | Facility: CLINIC | Age: 68
End: 2023-12-06
Payer: COMMERCIAL

## 2023-12-06 DIAGNOSIS — G47.33 OSA (OBSTRUCTIVE SLEEP APNEA): Primary | ICD-10-CM

## 2023-12-06 PROCEDURE — 99207 PR NON-BILLABLE SERV PER CHARTING: CPT | Performed by: NURSE PRACTITIONER

## 2023-12-06 NOTE — NURSING NOTE
Chief Complaint   Patient presents with    CPAP Follow Up       Initial BP (P) 109/74   Pulse (P) 98   Resp (P) 20   Wt (P) 100.9 kg (222 lb 6.4 oz)   LMP  (LMP Unknown)   SpO2 (P) 98%   BMI (P) 43.43 kg/m   Estimated body mass index is 43.43 kg/m  (pended) as calculated from the following:    Height as of 10/26/23: 1.524 m (5').    Weight as of this encounter: (P) 100.9 kg (222 lb 6.4 oz).    Medication Reconciliation: complete    Neck circumference:  inches /  centimeters.    DME:     Kingston Roberto MA  [facility]

## 2023-12-15 NOTE — PATIENT INSTRUCTIONS
Your Body mass index is 43.43 kg/m  (pended).  Weight management is a personal decision.  If you are interested in exploring weight loss strategies, the following discussion covers the approaches that may be successful. Body mass index (BMI) is one way to tell whether you are at a healthy weight, overweight, or obese. It measures your weight in relation to your height.  A BMI of 18.5 to 24.9 is in the healthy range. A person with a BMI of 25 to 29.9 is considered overweight, and someone with a BMI of 30 or greater is considered obese. More than two-thirds of American adults are considered overweight or obese.  Being overweight or obese increases the risk for further weight gain. Excess weight may lead to heart disease and diabetes.  Creating and following plans for healthy eating and physical activity may help you improve your health.  Weight control is part of healthy lifestyle and includes exercise, emotional health, and healthy eating habits. Careful eating habits lifelong are the mainstay of weight control. Though there are significant health benefits from weight loss, long-term weight loss with diet alone may be very difficult to achieve- studies show long-term success with dietary management in less than 10% of people. Attaining a healthy weight may be especially difficult to achieve in those with severe obesity. In some cases, medications, devices and surgical management might be considered.  What can you do?  If you are overweight or obese and are interested in methods for weight loss, you should discuss this with your provider.   Consider reducing daily calorie intake by 500 calories.   Keep a food journal.   Avoiding skipping meals, consider cutting portions instead.    Diet combined with exercise helps maintain muscle while optimizing fat loss. Strength training is particularly important for building and maintaining muscle mass. Exercise helps reduce stress, increase energy, and improves fitness.  Increasing exercise without diet control, however, may not burn enough calories to loose weight.     Start walking three days a week 10-20 minutes at a time  Work towards walking thirty minutes five days a week   Eventually, increase the speed of your walking for 1-2 minutes at time    In addition, we recommend that you review healthy lifestyles and methods for weight loss available through the National Institutes of Health patient information sites:  http://win.niddk.nih.gov/publications/index.htm    And look into health and wellness programs that may be available through your health insurance provider, employer, local community center, or denis club.            Your Body mass index is 43.43 kg/m  (pended).  Weight management is a personal decision.  If you are interested in exploring weight loss strategies, the following discussion covers the approaches that may be successful. Body mass index (BMI) is one way to tell whether you are at a healthy weight, overweight, or obese. It measures your weight in relation to your height.  A BMI of 18.5 to 24.9 is in the healthy range. A person with a BMI of 25 to 29.9 is considered overweight, and someone with a BMI of 30 or greater is considered obese. More than two-thirds of American adults are considered overweight or obese.  Being overweight or obese increases the risk for further weight gain. Excess weight may lead to heart disease and diabetes.  Creating and following plans for healthy eating and physical activity may help you improve your health.  Weight control is part of healthy lifestyle and includes exercise, emotional health, and healthy eating habits. Careful eating habits lifelong are the mainstay of weight control. Though there are significant health benefits from weight loss, long-term weight loss with diet alone may be very difficult to achieve- studies show long-term success with dietary management in less than 10% of people. Attaining a healthy weight may  be especially difficult to achieve in those with severe obesity. In some cases, medications, devices and surgical management might be considered.  What can you do?  If you are overweight or obese and are interested in methods for weight loss, you should discuss this with your provider.   Consider reducing daily calorie intake by 500 calories.   Keep a food journal.   Avoiding skipping meals, consider cutting portions instead.    Diet combined with exercise helps maintain muscle while optimizing fat loss. Strength training is particularly important for building and maintaining muscle mass. Exercise helps reduce stress, increase energy, and improves fitness. Increasing exercise without diet control, however, may not burn enough calories to loose weight.     Start walking three days a week 10-20 minutes at a time  Work towards walking thirty minutes five days a week   Eventually, increase the speed of your walking for 1-2 minutes at time    In addition, we recommend that you review healthy lifestyles and methods for weight loss available through the National Institutes of Health patient information sites:  http://win.niddk.nih.gov/publications/index.htm    And look into health and wellness programs that may be available through your health insurance provider, employer, local community center, or denis club.

## 2023-12-17 DIAGNOSIS — I10 ESSENTIAL HYPERTENSION: ICD-10-CM

## 2023-12-18 RX ORDER — LISINOPRIL AND HYDROCHLOROTHIAZIDE 20; 25 MG/1; MG/1
2 TABLET ORAL DAILY
Qty: 180 TABLET | Refills: 3 | Status: SHIPPED | OUTPATIENT
Start: 2023-12-18

## 2023-12-28 DIAGNOSIS — G47.33 OSA (OBSTRUCTIVE SLEEP APNEA): Primary | ICD-10-CM

## 2024-03-11 DIAGNOSIS — K21.9 GASTROESOPHAGEAL REFLUX DISEASE WITHOUT ESOPHAGITIS: ICD-10-CM

## 2024-04-02 ENCOUNTER — DOCUMENTATION ONLY (OUTPATIENT)
Dept: SLEEP MEDICINE | Facility: CLINIC | Age: 69
End: 2024-04-02
Payer: MEDICARE

## 2024-04-02 NOTE — PROGRESS NOTES
Patient did return CPAP machine to [facility] in [place] on 4/2/24. She was unable to complete Medicare Requirements (Visit with Sleep Provider & Obtain a new RX for CPAP). There are also notes in [facility] in which the patient did advise us she does not want CPAP machine anymore as she is not using it.     Patricia Johnson  Lead Respiratory DME Coordinator

## 2024-04-08 ENCOUNTER — ANCILLARY PROCEDURE (OUTPATIENT)
Dept: BONE DENSITY | Facility: CLINIC | Age: 69
End: 2024-04-08
Attending: INTERNAL MEDICINE
Payer: MEDICARE

## 2024-04-08 ENCOUNTER — ANCILLARY PROCEDURE (OUTPATIENT)
Dept: MAMMOGRAPHY | Facility: CLINIC | Age: 69
End: 2024-04-08
Attending: INTERNAL MEDICINE
Payer: MEDICARE

## 2024-04-08 DIAGNOSIS — Z78.0 POST-MENOPAUSAL: ICD-10-CM

## 2024-04-08 DIAGNOSIS — Z12.31 VISIT FOR SCREENING MAMMOGRAM: ICD-10-CM

## 2024-04-08 PROCEDURE — 77063 BREAST TOMOSYNTHESIS BI: CPT | Mod: TC | Performed by: RADIOLOGY

## 2024-04-08 PROCEDURE — 77067 SCR MAMMO BI INCL CAD: CPT | Mod: TC | Performed by: RADIOLOGY

## 2024-04-08 PROCEDURE — 77080 DXA BONE DENSITY AXIAL: CPT | Mod: TC | Performed by: RADIOLOGY

## 2024-04-08 PROCEDURE — 77081 DXA BONE DENSITY APPENDICULR: CPT | Mod: TC | Performed by: RADIOLOGY

## 2024-05-15 DIAGNOSIS — E78.5 HYPERLIPIDEMIA LDL GOAL <130: ICD-10-CM

## 2024-05-18 ENCOUNTER — MYC MEDICAL ADVICE (OUTPATIENT)
Dept: INTERNAL MEDICINE | Facility: CLINIC | Age: 69
End: 2024-05-18
Payer: MEDICARE

## 2024-05-18 DIAGNOSIS — E78.5 HYPERLIPIDEMIA LDL GOAL <130: ICD-10-CM

## 2024-05-20 RX ORDER — ROSUVASTATIN CALCIUM 10 MG/1
10 TABLET, COATED ORAL DAILY
Qty: 90 TABLET | Refills: 3 | Status: SHIPPED | OUTPATIENT
Start: 2024-05-20

## 2024-05-20 NOTE — TELEPHONE ENCOUNTER
Spoke with patient who stated that due to severe muscle aches, patient was reduced to 1/2 pill every other day by PCP. Patient then had a follow up with PCP and is now taking full tablet (10 mg) every other day.

## 2024-06-27 ENCOUNTER — NURSE TRIAGE (OUTPATIENT)
Dept: INTERNAL MEDICINE | Facility: CLINIC | Age: 69
End: 2024-06-27
Payer: MEDICARE

## 2024-06-27 DIAGNOSIS — B02.9 HERPES ZOSTER WITHOUT COMPLICATION: Primary | ICD-10-CM

## 2024-06-27 RX ORDER — VALACYCLOVIR HYDROCHLORIDE 1 G/1
1000 TABLET, FILM COATED ORAL 3 TIMES DAILY
Qty: 21 TABLET | Refills: 0 | Status: SHIPPED | OUTPATIENT
Start: 2024-06-27 | End: 2024-07-04

## 2024-06-27 NOTE — TELEPHONE ENCOUNTER
Nurse Triage SBAR    Is this a 2nd Level Triage? YES, LICENSED PRACTITIONER REVIEW IS REQUIRED    Situation: Rash on neck and chest    Background: Patient believes she may have shingles again. States she is hoping to get ahead of this before it gets worse.    Assessment: States last night she noticed a bumpy rash on her neck that travels down to her left breast. States the rash is itchy and there are several bumps that look like mosquito bites. Denies pain or any other symptoms.     Protocol Recommended Disposition:   See Today In Office    Recommendation: Writer offered to schedule a visit to examine the rash and determine if shingles treatment is appropriate. Patient advised that the next available visit with Dr Lambert is not until 7/3. Patient states she will instead submit an E-visit with pictures of her rash as she prefers to only schedule with Dr Lambert. Writer instructed patient to call back and schedule an in-person appointment if symptoms worsen or she changes her mind.        Does the patient meet one of the following criteria for ADS visit consideration? 16+ years old, with an [facility] PCP     TIP  Providers, please consider if this condition is appropriate for management at one of our Acute and Diagnostic Services sites.     If patient is a good candidate, please use dotphrase <dot>triageresponse and select Refer to ADS to document.      Reason for Disposition   Patient wants to be seen    Additional Information   Negative: Shingles rash (matches SYMPTOMS) and weak immune system (e.g., HIV positive,  cancer chemotherapy, chronic steroid treatment, splenectomy) and NOT taking antiviral medication   Negative: Shingles rash of face and facial weakness   Negative: Shingles rash of face or ear and earache or ringing in the ear   Negative: Fever > 100.4 F (38.0 C)   Negative: SEVERE pain (e.g., excruciating)   Negative: Shingles rash (matches SYMPTOMS) and onset within past 72 hours   Negative: Looks infected  (spreading redness, pus) and no fever   Negative: Shingles rash of face and eye pain or blurred vision   Negative: Shingles rash on the eyelid or tip of the nose   Negative: Shingles rash and spots start appearing other places on body   Negative: Patient sounds very sick or weak to the triager   Negative: Localized rash and doesn't match the SYMPTOMS of shingles   Negative: Back pain and doesn't match the SYMPTOMS of shingles   Negative: Shingles Vaccine (Recombinant Zoster Vaccine; RZV; Shingrix), questions about   Negative: Difficult to awaken or acting confused (e.g., disoriented, slurred speech)   Negative: Sounds like a life-threatening emergency to the triager    Protocols used: Shingles-A-OH

## 2024-09-09 DIAGNOSIS — K21.9 GASTROESOPHAGEAL REFLUX DISEASE WITHOUT ESOPHAGITIS: ICD-10-CM

## 2024-11-22 SDOH — HEALTH STABILITY: PHYSICAL HEALTH: ON AVERAGE, HOW MANY MINUTES DO YOU ENGAGE IN EXERCISE AT THIS LEVEL?: 30 MIN

## 2024-11-22 SDOH — HEALTH STABILITY: PHYSICAL HEALTH: ON AVERAGE, HOW MANY DAYS PER WEEK DO YOU ENGAGE IN MODERATE TO STRENUOUS EXERCISE (LIKE A BRISK WALK)?: 3 DAYS

## 2024-11-22 ASSESSMENT — SOCIAL DETERMINANTS OF HEALTH (SDOH): HOW OFTEN DO YOU GET TOGETHER WITH FRIENDS OR RELATIVES?: THREE TIMES A WEEK

## 2024-11-22 ASSESSMENT — ASTHMA QUESTIONNAIRES
QUESTION_3 LAST FOUR WEEKS HOW OFTEN DID YOUR ASTHMA SYMPTOMS (WHEEZING, COUGHING, SHORTNESS OF BREATH, CHEST TIGHTNESS OR PAIN) WAKE YOU UP AT NIGHT OR EARLIER THAN USUAL IN THE MORNING: NOT AT ALL
ACT_TOTALSCORE: 25
QUESTION_4 LAST FOUR WEEKS HOW OFTEN HAVE YOU USED YOUR RESCUE INHALER OR NEBULIZER MEDICATION (SUCH AS ALBUTEROL): NOT AT ALL
QUESTION_2 LAST FOUR WEEKS HOW OFTEN HAVE YOU HAD SHORTNESS OF BREATH: NOT AT ALL
QUESTION_5 LAST FOUR WEEKS HOW WOULD YOU RATE YOUR ASTHMA CONTROL: COMPLETELY CONTROLLED
ACT_TOTALSCORE: 25
QUESTION_1 LAST FOUR WEEKS HOW MUCH OF THE TIME DID YOUR ASTHMA KEEP YOU FROM GETTING AS MUCH DONE AT WORK, SCHOOL OR AT HOME: NONE OF THE TIME

## 2024-11-25 ENCOUNTER — ANCILLARY PROCEDURE (OUTPATIENT)
Dept: GENERAL RADIOLOGY | Facility: CLINIC | Age: 69
End: 2024-11-25
Attending: INTERNAL MEDICINE
Payer: COMMERCIAL

## 2024-11-25 ENCOUNTER — TELEPHONE (OUTPATIENT)
Dept: INTERNAL MEDICINE | Facility: CLINIC | Age: 69
End: 2024-11-25

## 2024-11-25 ENCOUNTER — OFFICE VISIT (OUTPATIENT)
Dept: INTERNAL MEDICINE | Facility: CLINIC | Age: 69
End: 2024-11-25
Attending: INTERNAL MEDICINE
Payer: COMMERCIAL

## 2024-11-25 VITALS
HEIGHT: 60 IN | DIASTOLIC BLOOD PRESSURE: 78 MMHG | SYSTOLIC BLOOD PRESSURE: 119 MMHG | OXYGEN SATURATION: 100 % | WEIGHT: 226.9 LBS | RESPIRATION RATE: 16 BRPM | TEMPERATURE: 98.7 F | HEART RATE: 95 BPM | BODY MASS INDEX: 44.55 KG/M2

## 2024-11-25 DIAGNOSIS — R26.89 BALANCE PROBLEMS: ICD-10-CM

## 2024-11-25 DIAGNOSIS — K21.9 GASTROESOPHAGEAL REFLUX DISEASE WITHOUT ESOPHAGITIS: ICD-10-CM

## 2024-11-25 DIAGNOSIS — Z13.1 SCREENING FOR DIABETES MELLITUS: ICD-10-CM

## 2024-11-25 DIAGNOSIS — R19.7 DIARRHEA, UNSPECIFIED TYPE: ICD-10-CM

## 2024-11-25 DIAGNOSIS — M16.10 HIP ARTHRITIS: ICD-10-CM

## 2024-11-25 DIAGNOSIS — E78.00 HYPERCHOLESTEROLEMIA: ICD-10-CM

## 2024-11-25 DIAGNOSIS — N18.31 STAGE 3A CHRONIC KIDNEY DISEASE (H): ICD-10-CM

## 2024-11-25 DIAGNOSIS — M54.41 CHRONIC BILATERAL LOW BACK PAIN WITH RIGHT-SIDED SCIATICA: ICD-10-CM

## 2024-11-25 DIAGNOSIS — G89.29 CHRONIC BILATERAL LOW BACK PAIN WITH RIGHT-SIDED SCIATICA: ICD-10-CM

## 2024-11-25 DIAGNOSIS — I10 ESSENTIAL HYPERTENSION: ICD-10-CM

## 2024-11-25 DIAGNOSIS — N18.32 STAGE 3B CHRONIC KIDNEY DISEASE (H): ICD-10-CM

## 2024-11-25 DIAGNOSIS — M25.551 HIP PAIN, RIGHT: ICD-10-CM

## 2024-11-25 DIAGNOSIS — G47.33 OSA (OBSTRUCTIVE SLEEP APNEA): ICD-10-CM

## 2024-11-25 DIAGNOSIS — D50.9 IRON DEFICIENCY ANEMIA, UNSPECIFIED IRON DEFICIENCY ANEMIA TYPE: ICD-10-CM

## 2024-11-25 DIAGNOSIS — Z00.00 ENCOUNTER FOR ANNUAL WELLNESS EXAM IN MEDICARE PATIENT: Primary | ICD-10-CM

## 2024-11-25 DIAGNOSIS — Z12.31 ENCOUNTER FOR SCREENING MAMMOGRAM FOR BREAST CANCER: ICD-10-CM

## 2024-11-25 DIAGNOSIS — E55.9 VITAMIN D DEFICIENCY: ICD-10-CM

## 2024-11-25 LAB
ALBUMIN SERPL BCG-MCNC: 4.3 G/DL (ref 3.5–5.2)
ALP SERPL-CCNC: 82 U/L (ref 40–150)
ALT SERPL W P-5'-P-CCNC: 10 U/L (ref 0–50)
ANION GAP SERPL CALCULATED.3IONS-SCNC: 16 MMOL/L (ref 7–15)
AST SERPL W P-5'-P-CCNC: 17 U/L (ref 0–45)
BASOPHILS # BLD AUTO: 0 10E3/UL (ref 0–0.2)
BASOPHILS NFR BLD AUTO: 0 %
BILIRUB SERPL-MCNC: 0.5 MG/DL
BUN SERPL-MCNC: 35.5 MG/DL (ref 8–23)
CALCIUM SERPL-MCNC: 10.1 MG/DL (ref 8.8–10.4)
CHLORIDE SERPL-SCNC: 105 MMOL/L (ref 98–107)
CHOLEST SERPL-MCNC: 207 MG/DL
CREAT SERPL-MCNC: 1.49 MG/DL (ref 0.51–0.95)
CREAT UR-MCNC: 353 MG/DL
EGFRCR SERPLBLD CKD-EPI 2021: 38 ML/MIN/1.73M2
EOSINOPHIL # BLD AUTO: 0.2 10E3/UL (ref 0–0.7)
EOSINOPHIL NFR BLD AUTO: 2 %
ERYTHROCYTE [DISTWIDTH] IN BLOOD BY AUTOMATED COUNT: 12.6 % (ref 10–15)
EST. AVERAGE GLUCOSE BLD GHB EST-MCNC: 111 MG/DL
FASTING STATUS PATIENT QL REPORTED: ABNORMAL
FASTING STATUS PATIENT QL REPORTED: ABNORMAL
GLUCOSE SERPL-MCNC: 97 MG/DL (ref 70–99)
HBA1C MFR BLD: 5.5 % (ref 0–5.6)
HCO3 SERPL-SCNC: 19 MMOL/L (ref 22–29)
HCT VFR BLD AUTO: 35.4 % (ref 35–47)
HDLC SERPL-MCNC: 69 MG/DL
HGB BLD-MCNC: 11.6 G/DL (ref 11.7–15.7)
IMM GRANULOCYTES # BLD: 0 10E3/UL
IMM GRANULOCYTES NFR BLD: 0 %
LDLC SERPL CALC-MCNC: 107 MG/DL
LYMPHOCYTES # BLD AUTO: 2 10E3/UL (ref 0.8–5.3)
LYMPHOCYTES NFR BLD AUTO: 28 %
MCH RBC QN AUTO: 31.9 PG (ref 26.5–33)
MCHC RBC AUTO-ENTMCNC: 32.8 G/DL (ref 31.5–36.5)
MCV RBC AUTO: 97 FL (ref 78–100)
MICROALBUMIN UR-MCNC: 22.4 MG/L
MICROALBUMIN/CREAT UR: 6.35 MG/G CR (ref 0–25)
MONOCYTES # BLD AUTO: 0.4 10E3/UL (ref 0–1.3)
MONOCYTES NFR BLD AUTO: 6 %
NEUTROPHILS # BLD AUTO: 4.6 10E3/UL (ref 1.6–8.3)
NEUTROPHILS NFR BLD AUTO: 64 %
NONHDLC SERPL-MCNC: 138 MG/DL
PLATELET # BLD AUTO: 271 10E3/UL (ref 150–450)
POTASSIUM SERPL-SCNC: 3.7 MMOL/L (ref 3.4–5.3)
PROT SERPL-MCNC: 7.4 G/DL (ref 6.4–8.3)
RBC # BLD AUTO: 3.64 10E6/UL (ref 3.8–5.2)
SODIUM SERPL-SCNC: 140 MMOL/L (ref 135–145)
TRIGL SERPL-MCNC: 157 MG/DL
TSH SERPL DL<=0.005 MIU/L-ACNC: 0.9 UIU/ML (ref 0.3–4.2)
VIT D+METAB SERPL-MCNC: 33 NG/ML (ref 20–50)
WBC # BLD AUTO: 7.2 10E3/UL (ref 4–11)

## 2024-11-25 PROCEDURE — 72100 X-RAY EXAM L-S SPINE 2/3 VWS: CPT | Mod: TC | Performed by: RADIOLOGY

## 2024-11-25 PROCEDURE — 73502 X-RAY EXAM HIP UNI 2-3 VIEWS: CPT | Mod: TC | Performed by: STUDENT IN AN ORGANIZED HEALTH CARE EDUCATION/TRAINING PROGRAM

## 2024-11-25 RX ORDER — LISINOPRIL AND HYDROCHLOROTHIAZIDE 20; 25 MG/1; MG/1
2 TABLET ORAL DAILY
Qty: 180 TABLET | Refills: 3 | Status: SHIPPED | OUTPATIENT
Start: 2024-11-25

## 2024-11-25 ASSESSMENT — ENCOUNTER SYMPTOMS: DIARRHEA: 1

## 2024-11-25 NOTE — TELEPHONE ENCOUNTER
Patient has had endoscopy in 2011 which she is on her chart, she reports that she has had endoscopy since then, please obtain records from [facility] for my review

## 2024-11-25 NOTE — PROGRESS NOTES
DME (Durable Medical Equipment) Orders and Documentation  Orders Placed This Encounter   Procedures    Walker Order        The patient was assessed and it was determined the patient is in need of the following listed DME Supplies/Equipment. Please complete supporting documentation below to demonstrate medical necessity.         DME (Durable Medical Equipment) Orders and Documentation  Orders Placed This Encounter   Procedures    Walker Order      The patient was assessed and it was determined the patient is in need of the following listed DME Supplies/Equipment. Please complete supporting documentation below to demonstrate medical necessity.      DME All Other Item(s) Documentation    List reason for need and supporting documentation for medical necessity below for each DME item.     1.  Difficulty with weightbearing, weakness and balance problem due to right hip arthritis, low back pain and radiculopathy and balance problems.

## 2024-11-25 NOTE — TELEPHONE ENCOUNTER
Called and talked to [facility] and they do not have any more recent endoscopy.      Pt did have colonoscopy in 2021.

## 2024-11-25 NOTE — PROGRESS NOTES
Preventive Care Visit  [facility]  Melia Lambert MD, Internal Medicine  Nov 25, 2024      Assessment & Plan     Encounter for annual wellness exam in Medicare patient  Will do fasting labs today, she is due for mammogram.  Colonoscopy will be due in 2026.  Recent bone density showed very mild osteopenia.  Flu and COVID vaccines were administered today.  Discussed to get RSV shingles and pneumonia shot at the pharmacy at a later date.  - Hemoglobin A1c  - MA Screen Bilateral w/[place]; Future    Essential hypertension  Well-controlled on current regimen, she denies orthostasis.  - lisinopril-hydrochlorothiazide (ZESTORETIC) 20-25 MG tablet; Take 2 tablets by mouth daily.  - Comprehensive metabolic panel  - TSH with free T4 reflex    Stage 3a chronic kidney disease (H)  This is very mild, she uses meloxicam infrequently  - Albumin Random Urine Quantitative with Creat Ratio  - Comprehensive metabolic panel  - CBC with platelets and differential    Hip pain, right  I am concerned about severe arthritis in her right hip due to weakness in her thigh and pain with long weightbearing, will obtain an x-ray and refer to orthopedist if needed.  - XR Hip Right 2-3 Views; Future  - Walker Order    Hypercholesterolemia  Will check fasting labs, he is currently on Crestor  - Lipid panel reflex to direct LDL Non-fasting  - Comprehensive metabolic panel  - TSH with free T4 reflex    Balance problems, chronic low back pain, right hip arthritis, pain with ambulation  Walker was ordered  - Walker Order    Chronic bilateral low back pain with right-sided sciatica  Straight leg raise test today is negative, will check an x-ray of her spine and inferior right hip to further evaluate right groin pain  - XR Lumbar Spine 2/3 Views; Future  - Walker Order    BMI 40.0-44.9, adult (H)  Weight fluctuates, she gained some weight in the last couple of months, she has heard about injectable medications for weight loss but  really does not want to inject anything.  She is not very physically active.  She reports only eating 2 meals a day, discussed to decrease caloric amount of foods and aim for nutritious foods.    Stage 3b chronic kidney disease (H)  Creatinine is stable.    Diarrhea, unspecified type  This is intermittent, she is up-to-date on colonoscopy, family history of lactose intolerance and gluten intolerance.  Will check labs today, discussed to try to eliminate dairy for a week to see if it makes a big change in her diarrhea, information on FODMAP elimination diet was provided.  Currently diarrhea is not persistent to consider lymphocytic colitis  - Comprehensive metabolic panel  - CBC with platelets and differential  - TSH with free T4 reflex  - IgA [LAB73]  - Deamidated Giladin Peptide Liana IgA IgG [JSK2923]  - Tissue transglutaminase liana IgA and IgG [RUC9773]  - Endomysial Antibody IgA by IFA [[id number]]    Vitamin D deficiency  She is taking supplement  - Vitamin D Deficiency    SG (obstructive sleep apnea)  Unable to tolerate CPAP, discussed to inquire about oral appliance from sleep clinic    Gastroesophageal reflux disease without esophagitis  She is on chronic PPI and unable to taper the dose down due to recurrent symptoms.  When she had endoscopy done in 2011 visually there was a question of Schwartz's although the pathology was negative.  She reports that she has had endoscopy done since and it was negative, will try to obtain records.      BMI  Estimated body mass index is 44.31 kg/m  as calculated from the following:    Height as of this encounter: 1.524 m (5').    Weight as of this encounter: 102.9 kg (226 lb 14.4 oz).     Counseling  Appropriate preventive services were addressed with this patient via screening, questionnaire, or discussion as appropriate for fall prevention, nutrition, physical activity, Tobacco-use cessation, social engagement, weight loss and cognition.  Checklist reviewing preventive services  available has been given to the patient.  Reviewed patient's diet, addressing concerns and/or questions.   She is at risk for lack of exercise and has been provided with information to increase physical activity for the benefit of her well-being.   The patient was provided with written information regarding signs of hearing loss.       Chantal Bradshaw is a 69 year old, presenting for the following:  Wellness Visit (Fasting - would like flu & Covid vaccine ), Diarrhea (Pt reports loose stools aguilar x 4-5 months, not sure if it's diary or particular foods ), and Orders (Would like an Rx for walker with wheels )        11/25/2024     9:16 AM   Additional Questions   Roomed by Ashley   Accompanied by alone         11/25/2024     9:16 AM   Patient Reported Additional Medications   Patient reports taking the following new medications none         Diarrhea      Leeann is a 69 year old female  with history of increased BMI, high blood pressure, osteopenia, osteoarthritis in her knees, acid reflux, colon polyps, cervical polyp, decreased hearing/aids and vitamin D deficiency, obstructive sleep apnea, questionable Schwartz's esophagus.    She is currently here for wellness exam.     Please look complaints about balance, since I last saw her, she continues to have significant pain in her right groin and weakness in her right thigh, it sometimes hard to go up and down the stairs due to pain and prolonged standing.  She has gotten walker from her sister but wanted a prescription one that would feed her hide better.  Currently she denies persistent pain in her right leg but does mention that in the past has had intermittent radiculopathy.    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   helps her in this endeavor.    She has been having intermittent diarrhea for the last 5 months, usually she will have gas and sometimes watery diarrhea 3 days a week.  Usually it happens after she eats, milk can trigger it.  She has  had cholecystectomy but denies diarrhea right after surgery.  She does have family history of lactose and gluten intolerance and her son.  Colonoscopy in 2021 showed small precancerous polyps.    She takes meloxicam as needed only and not on regular basis.    For sleep apnea she is unable to tolerate facial mask, she denies fatigue during the day.    She does have acid reflux and unable to taper down omeprazole due to recurrence of symptoms.  She reports that she did have endoscopy in the last couple of years.    She wears glasses and sees ophthalmologist once a year, no vision changes.    No hearing problems, no recent falls.    Weight has fluctuated, in the past she was able to lose weight and was 214 pounds, over the last couple of months it did creep up to 226        Health Care Directive  Patient does not have a Health Care Directive: Discussed advance care planning with patient; information given to patient to review.      11/22/2024   General Health   How would you rate your overall physical health? (!) FAIR   Feel stress (tense, anxious, or unable to sleep) Not at all            11/22/2024   Nutrition   Diet: Regular (no restrictions)            11/22/2024   Exercise   Days per week of moderate/strenous exercise 3 days   Average minutes spent exercising at this level 30 min            11/22/2024   Social Factors   Frequency of gathering with friends or relatives Three times a week   Worry food won't last until get money to buy more No   Food not last or not have enough money for food? No   Do you have housing? (Housing is defined as stable permanent housing and does not include staying ouside in a car, in a tent, in an abandoned building, in an overnight shelter, or couch-surfing.) Yes   Are you worried about losing your housing? No   Lack of transportation? No   Unable to get utilities (heat,electricity)? No            11/25/2024   Fall Risk   Gait Speed Test Interpretation Greater than 5.01 seconds -  ABNORMAL              11/22/2024   Activities of Daily Living- Home Safety   Needs help with the following daily activites None of the above   Safety concerns in the home None of the above            11/22/2024   Dental   Dentist two times every year? Yes            11/22/2024   Hearing Screening   Hearing concerns? (!) IT'S HARD TO FOLLOW A CONVERSATION IN A NOISY RESTAURANT OR CROWDED ROOM.    (!) TROUBLE UNDERSTANDING SOFT OR WHISPERED SPEECH.       Multiple values from one day are sorted in reverse-chronological order         11/22/2024   Driving Risk Screening   Patient/family members have concerns about driving No            11/22/2024   General Alertness/Fatigue Screening   Have you been more tired than usual lately? No            11/22/2024   Urinary Incontinence Screening   Bothered by leaking urine in past 6 months No            11/22/2024   TB Screening   Were you born outside of the [place]? No            Today's PHQ-2 Score:       11/25/2024     9:15 AM   PHQ-2 ( 1999 Pfizer)   PHQ-2 Score Incomplete           11/22/2024   Substance Use   Alcohol more than 3/day or more than 7/wk No   Do you have a current opioid prescription? No   How severe/bad is pain from 1 to 10? 6/10   Do you use any other substances recreationally? No        Social History     Tobacco Use    Smoking status: Never    Smokeless tobacco: Never   Substance Use Topics    Alcohol use: Yes     Comment: Alcoholic Drinks/day: socially    Drug use: No           4/8/2024   LAST FHS-7 RESULTS   1st degree relative breast or ovarian cancer No   Any relative bilateral breast cancer No   Any male have breast cancer No   Any ONE woman have BOTH breast AND ovarian cancer No   Any woman with breast cancer before 50yrs No   2 or more relatives with breast AND/OR ovarian cancer No   2 or more relatives with breast AND/OR bowel cancer Yes            History of abnormal Pap smear:         Latest Ref Rng & Units 12/14/2020     2:27 PM 11/11/2015     8:01 AM    PAP / HPV   PAP Negative for squamous intraepithelial lesion or malignancy. Negative for squamous intraepithelial lesion or malignancy  Electronically signed by Megha Sarmiento CT (ASCP) on 12/21/2020 at  3:12 PM    Negative for squamous intraepithelial lesion or malignancy  Electronically signed by Kathie Mcgee CT (ASCP) on 11/23/2015 at 12:46 PM      HPV 16 DNA NEG Negative     HPV 18 DNA NEG Negative     Other HR HPV NEG Negative       ASCVD Risk   The 10-year ASCVD risk score (Brittaney PARKS, et al., 2019) is: 7.7%    Values used to calculate the score:      Age: 69 years      Sex: Female      Is Non- : No      Diabetic: No      Tobacco smoker: No      Systolic Blood Pressure: 119 mmHg      Is BP treated: Yes      HDL Cholesterol: 70 mg/dL      Total Cholesterol: 220 mg/dL      Reviewed and updated as needed this visit by Provider                    Current providers sharing in care for this patient include:  Patient Care Team:  Melia Lambert MD as PCP - General (Internal Medicine)  Melia Lambert MD as Assigned PCP  Yanely Oates APRN CNP as Assigned Pulmonology Provider    The following health maintenance items are reviewed in Epic and correct as of today:  Health Maintenance   Topic Date Due    ANNUAL REVIEW OF  ORDERS  Never done    ASTHMA ACTION PLAN  Never done    Pneumococcal Vaccine: 65+ Years (1 of 2 - PCV) Never done    ZOSTER IMMUNIZATION (1 of 2) Never done    RSV VACCINE (1 - Risk 60-74 years 1-dose series) Never done    PHQ-2 (once per calendar year)  01/01/2024    INFLUENZA VACCINE (1) 09/01/2024    COVID-19 Vaccine (6 - 2024-25 season) 09/01/2024    BMP  10/26/2024    LIPID  10/26/2024    MICROALBUMIN  10/26/2024    MEDICARE ANNUAL WELLNESS VISIT  10/26/2024    HEMOGLOBIN  10/26/2024    ASTHMA CONTROL TEST  05/25/2025    FALL RISK ASSESSMENT  11/25/2025    COLORECTAL CANCER SCREENING  02/16/2026    MAMMO SCREENING  04/08/2026     GLUCOSE  10/26/2026    ADVANCE CARE PLANNING  10/26/2028    DTAP/TDAP/TD IMMUNIZATION (4 - Td or Tdap) 11/11/2029    DEXA  04/08/2039    URINALYSIS  Completed    HPV IMMUNIZATION  Aged Out    MENINGITIS IMMUNIZATION  Aged Out    RSV MONOCLONAL ANTIBODY  Aged Out    HEPATITIS C SCREENING  Discontinued    PAP  Discontinued     Review of systems: As above, rest is negative     Objective    Exam  /78   Pulse 95   Temp 98.7  F (37.1  C) (Tympanic)   Resp 16   Ht 1.524 m (5')   Wt 102.9 kg (226 lb 14.4 oz)   LMP  (LMP Unknown)   SpO2 100%   BMI 44.31 kg/m     Estimated body mass index is 44.31 kg/m  as calculated from the following:    Height as of this encounter: 1.524 m (5').    Weight as of this encounter: 102.9 kg (226 lb 14.4 oz).    Physical Exam  General: well appearing female, alert and oriented x3, it is hard for her to get onto examining chairs so we perform a exam in the regular chair.  EYES: Eyelids, conjunctiva, and sclera were normal. Pupils were normal.   HEAD, EARS, NOSE, MOUTH, AND THROAT: no cervical LAD, no thyromegaly or nodules appreciated. TMs are visualized and normal, oropharynx is clear.  RESPIRATORY: respirations non labored, CTA bl, no wheezes, rales, no forced expiratory wheezing.  CARDIOVASCULAR: Heart rate and rhythm were normal. No murmurs, rubs,gallops. There was no peripheral edema. No carotid bruits.  GASTROINTESTINAL: Positive bowel sounds, abdomen is soft, non tender, non distended.     MUSCULOSKELETAL: Muscle mass was normal for age. No joint synovitis or deformity.  She does have weakness in her right thigh, straight leg raise test is negative.  LYMPHATIC: There were no enlarged nodes palpable.  SKIN/HAIR/NAILS: Skin color was normal.  No rashes.  NEUROLOGIC: The patient was alert and oriented.  Speech was normal.  There is no facial asymmetry.   PSYCHIATRIC:  Mood and affect were normal.   Breast exam: No axilla lymphadenopathy, breast masses or skin changes  appreciated.        11/25/2024   Mini Cog   Clock Draw Score 2 Normal   3 Item Recall 3 objects recalled   Mini Cog Total Score 5          Signed Electronically by: Melia Lambert MD

## 2024-11-25 NOTE — PATIENT INSTRUCTIONS
Diarrhea: try eliminating milk, avoid bill boluses of fat, will check labs, try elimination diet: FODMAP,     2. Flu and covid vaccine today    3. Labs today    4. Ask sleep clinic about oral appliance    5. Get RSV , Shingles, Pneumonia shot ( Pneumococcal 20)    6. Weight : try to cut back on caloric density     7. XR of right hip and back

## 2024-11-25 NOTE — TELEPHONE ENCOUNTER
Spoke with pt.    XR: Of the hip with severe arthritis, CBC with mild anemia.    Put referrals for [facility] and endoscopy with [facility].  Please fax over.

## 2024-11-26 ENCOUNTER — PATIENT OUTREACH (OUTPATIENT)
Dept: CARE COORDINATION | Facility: CLINIC | Age: 69
End: 2024-11-26
Payer: MEDICARE

## 2024-11-26 LAB
GLIADIN IGA SER-ACNC: 1.1 U/ML
GLIADIN IGG SER-ACNC: <0.6 U/ML
IGA SERPL-MCNC: 214 MG/DL (ref 84–499)
TTG IGA SER-ACNC: 0.9 U/ML
TTG IGG SER-ACNC: <0.6 U/ML

## 2024-11-28 LAB — ENDOMYSIUM IGA TITR SER IF: NORMAL {TITER}

## 2024-11-29 ENCOUNTER — MYC MEDICAL ADVICE (OUTPATIENT)
Dept: INTERNAL MEDICINE | Facility: CLINIC | Age: 69
End: 2024-11-29
Payer: MEDICARE

## 2024-12-16 DIAGNOSIS — K21.9 GASTROESOPHAGEAL REFLUX DISEASE WITHOUT ESOPHAGITIS: ICD-10-CM

## 2024-12-26 ENCOUNTER — MYC MEDICAL ADVICE (OUTPATIENT)
Dept: INTERNAL MEDICINE | Facility: CLINIC | Age: 69
End: 2024-12-26
Payer: MEDICARE

## 2025-01-14 ENCOUNTER — TRANSFERRED RECORDS (OUTPATIENT)
Dept: HEALTH INFORMATION MANAGEMENT | Facility: CLINIC | Age: 70
End: 2025-01-14
Payer: MEDICARE

## 2025-01-23 ENCOUNTER — TRANSFERRED RECORDS (OUTPATIENT)
Dept: HEALTH INFORMATION MANAGEMENT | Facility: CLINIC | Age: 70
End: 2025-01-23
Payer: COMMERCIAL

## 2025-03-05 ENCOUNTER — TRANSFERRED RECORDS (OUTPATIENT)
Dept: HEALTH INFORMATION MANAGEMENT | Facility: CLINIC | Age: 70
End: 2025-03-05
Payer: COMMERCIAL

## 2025-04-22 ENCOUNTER — TELEPHONE (OUTPATIENT)
Dept: INTERNAL MEDICINE | Facility: CLINIC | Age: 70
End: 2025-04-22
Payer: COMMERCIAL

## 2025-04-22 DIAGNOSIS — E66.01 MORBID OBESITY (H): ICD-10-CM

## 2025-04-22 DIAGNOSIS — G47.33 OSA (OBSTRUCTIVE SLEEP APNEA): Primary | ICD-10-CM

## 2025-04-22 NOTE — TELEPHONE ENCOUNTER
PRIOR AUTHORIZATION DENIED    Medication: WEGOVY 0.25 MG/0.5ML SC SOAJ  Insurance Company: [employer] - Phone [phone] Fax [phone]  Denial Date: 4/22/2025  Denial Reason(s): Did not meet criteria for coverage, usually requires a comorbidity involving previous stroke or cardiovascular event.      Appeal Information:       Patient Notified: No

## 2025-04-23 ENCOUNTER — MYC MEDICAL ADVICE (OUTPATIENT)
Dept: INTERNAL MEDICINE | Facility: CLINIC | Age: 70
End: 2025-04-23
Payer: COMMERCIAL

## 2025-04-23 NOTE — TELEPHONE ENCOUNTER
I Added SG Dx in addition to morbid obesity and reordered Zepbound , could you run preauth again please?  FDA did approve Zepbound for SG Tx in December 2024..

## 2025-04-25 ENCOUNTER — TELEPHONE (OUTPATIENT)
Dept: INTERNAL MEDICINE | Facility: CLINIC | Age: 70
End: 2025-04-25
Payer: COMMERCIAL

## 2025-04-25 NOTE — LETTER
To whom it may concern,    Leeann Mckeon in under my care. She suffers from Sleep apnea and morbid obesity. She is unable to tolerate CPAP for sleep apnea treatment. Since Zepbound was approved by FDA for SG treatment in 2024, I believe it is an appropriate medication and should be covered by Medicare part D for this diagnosis.    Sincerely,  Dr Lambert    Electronically signed

## 2025-04-30 ENCOUNTER — ANCILLARY PROCEDURE (OUTPATIENT)
Dept: MAMMOGRAPHY | Facility: CLINIC | Age: 70
End: 2025-04-30
Attending: INTERNAL MEDICINE
Payer: MEDICARE

## 2025-04-30 DIAGNOSIS — Z12.31 ENCOUNTER FOR SCREENING MAMMOGRAM FOR BREAST CANCER: ICD-10-CM

## 2025-04-30 PROCEDURE — 77063 BREAST TOMOSYNTHESIS BI: CPT

## 2025-05-02 ENCOUNTER — TRANSFERRED RECORDS (OUTPATIENT)
Dept: HEALTH INFORMATION MANAGEMENT | Facility: CLINIC | Age: 70
End: 2025-05-02
Payer: COMMERCIAL

## 2025-05-02 NOTE — TELEPHONE ENCOUNTER
Charu  I wrote an appeal letter. Please appeal :)    DR PONCE  
PRIOR AUTHORIZATION DENIED    Medication: ZEPBOUND 2.5 MG/0.5ML SC SOAJ  Insurance Company: [employer] - Phone [phone] Fax [phone]  Denial Date: 4/25/2025  Denial Reason(s): Diagnosis not covered (usually requires co-morbidity of previous stroke or cardiac event)        Appeal Information:       Patient Notified: No    
ATTENDING CERTIFICATION

## 2025-06-24 ASSESSMENT — ASTHMA QUESTIONNAIRES
ACT_TOTALSCORE: 25
QUESTION_5 LAST FOUR WEEKS HOW WOULD YOU RATE YOUR ASTHMA CONTROL: COMPLETELY CONTROLLED
QUESTION_3 LAST FOUR WEEKS HOW OFTEN DID YOUR ASTHMA SYMPTOMS (WHEEZING, COUGHING, SHORTNESS OF BREATH, CHEST TIGHTNESS OR PAIN) WAKE YOU UP AT NIGHT OR EARLIER THAN USUAL IN THE MORNING: NOT AT ALL
QUESTION_4 LAST FOUR WEEKS HOW OFTEN HAVE YOU USED YOUR RESCUE INHALER OR NEBULIZER MEDICATION (SUCH AS ALBUTEROL): NOT AT ALL
QUESTION_2 LAST FOUR WEEKS HOW OFTEN HAVE YOU HAD SHORTNESS OF BREATH: NOT AT ALL
QUESTION_1 LAST FOUR WEEKS HOW MUCH OF THE TIME DID YOUR ASTHMA KEEP YOU FROM GETTING AS MUCH DONE AT WORK, SCHOOL OR AT HOME: NONE OF THE TIME

## 2025-06-30 ENCOUNTER — OFFICE VISIT (OUTPATIENT)
Dept: INTERNAL MEDICINE | Facility: CLINIC | Age: 70
End: 2025-06-30
Payer: MEDICARE

## 2025-06-30 DIAGNOSIS — Z01.818 PRE-OP EXAM: Primary | ICD-10-CM

## 2025-06-30 PROCEDURE — 99207 PR NO CHARGE LOS: CPT | Performed by: INTERNAL MEDICINE

## 2025-07-09 ENCOUNTER — TRANSFERRED RECORDS (OUTPATIENT)
Dept: HEALTH INFORMATION MANAGEMENT | Facility: CLINIC | Age: 70
End: 2025-07-09
Payer: MEDICARE

## 2025-07-23 ENCOUNTER — OFFICE VISIT (OUTPATIENT)
Dept: INTERNAL MEDICINE | Facility: CLINIC | Age: 70
End: 2025-07-23
Payer: MEDICARE

## 2025-07-23 ENCOUNTER — RESULTS FOLLOW-UP (OUTPATIENT)
Dept: INTERNAL MEDICINE | Facility: CLINIC | Age: 70
End: 2025-07-23

## 2025-07-23 VITALS
DIASTOLIC BLOOD PRESSURE: 85 MMHG | SYSTOLIC BLOOD PRESSURE: 130 MMHG | HEART RATE: 82 BPM | TEMPERATURE: 98 F | RESPIRATION RATE: 16 BRPM | BODY MASS INDEX: 41.51 KG/M2 | WEIGHT: 219.7 LBS

## 2025-07-23 DIAGNOSIS — I10 PRIMARY HYPERTENSION: ICD-10-CM

## 2025-07-23 DIAGNOSIS — K44.9 HIATAL HERNIA: ICD-10-CM

## 2025-07-23 DIAGNOSIS — G47.33 OSA (OBSTRUCTIVE SLEEP APNEA): ICD-10-CM

## 2025-07-23 DIAGNOSIS — M17.10 ARTHRITIS OF KNEE: ICD-10-CM

## 2025-07-23 DIAGNOSIS — K21.9 GASTROESOPHAGEAL REFLUX DISEASE WITHOUT ESOPHAGITIS: ICD-10-CM

## 2025-07-23 DIAGNOSIS — E66.01 MORBID OBESITY (H): ICD-10-CM

## 2025-07-23 DIAGNOSIS — Z01.818 PRE-OP EXAM: Primary | ICD-10-CM

## 2025-07-23 DIAGNOSIS — Z13.1 SCREENING FOR DIABETES MELLITUS: ICD-10-CM

## 2025-07-23 DIAGNOSIS — N18.32 STAGE 3B CHRONIC KIDNEY DISEASE (H): ICD-10-CM

## 2025-07-23 DIAGNOSIS — Z91.89 AT RISK FOR DEEP VENOUS THROMBOSIS: ICD-10-CM

## 2025-07-23 LAB
ANION GAP SERPL CALCULATED.3IONS-SCNC: 10 MMOL/L (ref 7–15)
ATRIAL RATE - MUSE: 69 BPM
BASOPHILS # BLD AUTO: 0 10E3/UL (ref 0–0.2)
BASOPHILS NFR BLD AUTO: 0 %
BUN SERPL-MCNC: 33.5 MG/DL (ref 8–23)
CALCIUM SERPL-MCNC: 9.9 MG/DL (ref 8.8–10.4)
CHLORIDE SERPL-SCNC: 104 MMOL/L (ref 98–107)
CREAT SERPL-MCNC: 1.19 MG/DL (ref 0.51–0.95)
CREAT UR-MCNC: 239 MG/DL
DIASTOLIC BLOOD PRESSURE - MUSE: NORMAL MMHG
EGFRCR SERPLBLD CKD-EPI 2021: 50 ML/MIN/1.73M2
EOSINOPHIL # BLD AUTO: 0.1 10E3/UL (ref 0–0.7)
EOSINOPHIL NFR BLD AUTO: 2 %
ERYTHROCYTE [DISTWIDTH] IN BLOOD BY AUTOMATED COUNT: 12.9 % (ref 10–15)
EST. AVERAGE GLUCOSE BLD GHB EST-MCNC: 103 MG/DL
GLUCOSE SERPL-MCNC: 94 MG/DL (ref 70–99)
HBA1C MFR BLD: 5.2 % (ref 0–5.6)
HCO3 SERPL-SCNC: 24 MMOL/L (ref 22–29)
HCT VFR BLD AUTO: 35.5 % (ref 35–47)
HGB BLD-MCNC: 11.7 G/DL (ref 11.7–15.7)
IMM GRANULOCYTES # BLD: 0 10E3/UL
IMM GRANULOCYTES NFR BLD: 0 %
INTERPRETATION ECG - MUSE: NORMAL
LYMPHOCYTES # BLD AUTO: 1.9 10E3/UL (ref 0.8–5.3)
LYMPHOCYTES NFR BLD AUTO: 28 %
MCH RBC QN AUTO: 32.4 PG (ref 26.5–33)
MCHC RBC AUTO-ENTMCNC: 33 G/DL (ref 31.5–36.5)
MCV RBC AUTO: 98 FL (ref 78–100)
MICROALBUMIN UR-MCNC: 15.4 MG/L
MICROALBUMIN/CREAT UR: 6.44 MG/G CR (ref 0–25)
MONOCYTES # BLD AUTO: 0.5 10E3/UL (ref 0–1.3)
MONOCYTES NFR BLD AUTO: 7 %
NEUTROPHILS # BLD AUTO: 4.3 10E3/UL (ref 1.6–8.3)
NEUTROPHILS NFR BLD AUTO: 63 %
P AXIS - MUSE: -17 DEGREES
PLATELET # BLD AUTO: 261 10E3/UL (ref 150–450)
POTASSIUM SERPL-SCNC: 4 MMOL/L (ref 3.4–5.3)
PR INTERVAL - MUSE: 112 MS
PTH-INTACT SERPL-MCNC: 40 PG/ML (ref 15–65)
QRS DURATION - MUSE: 78 MS
QT - MUSE: 368 MS
QTC - MUSE: 394 MS
R AXIS - MUSE: -22 DEGREES
RBC # BLD AUTO: 3.61 10E6/UL (ref 3.8–5.2)
SODIUM SERPL-SCNC: 138 MMOL/L (ref 135–145)
SYSTOLIC BLOOD PRESSURE - MUSE: NORMAL MMHG
T AXIS - MUSE: 0 DEGREES
VENTRICULAR RATE- MUSE: 69 BPM
WBC # BLD AUTO: 6.9 10E3/UL (ref 4–11)

## 2025-07-23 PROCEDURE — 85025 COMPLETE CBC W/AUTO DIFF WBC: CPT | Performed by: INTERNAL MEDICINE

## 2025-07-23 PROCEDURE — 1125F AMNT PAIN NOTED PAIN PRSNT: CPT | Performed by: INTERNAL MEDICINE

## 2025-07-23 PROCEDURE — 99214 OFFICE O/P EST MOD 30 MIN: CPT | Performed by: INTERNAL MEDICINE

## 2025-07-23 PROCEDURE — 82570 ASSAY OF URINE CREATININE: CPT | Performed by: INTERNAL MEDICINE

## 2025-07-23 PROCEDURE — G2211 COMPLEX E/M VISIT ADD ON: HCPCS | Performed by: INTERNAL MEDICINE

## 2025-07-23 PROCEDURE — 83970 ASSAY OF PARATHORMONE: CPT | Performed by: INTERNAL MEDICINE

## 2025-07-23 PROCEDURE — 93005 ELECTROCARDIOGRAM TRACING: CPT | Performed by: INTERNAL MEDICINE

## 2025-07-23 PROCEDURE — 82043 UR ALBUMIN QUANTITATIVE: CPT | Performed by: INTERNAL MEDICINE

## 2025-07-23 PROCEDURE — 80048 BASIC METABOLIC PNL TOTAL CA: CPT | Performed by: INTERNAL MEDICINE

## 2025-07-23 PROCEDURE — 83036 HEMOGLOBIN GLYCOSYLATED A1C: CPT | Performed by: INTERNAL MEDICINE

## 2025-07-23 PROCEDURE — 3044F HG A1C LEVEL LT 7.0%: CPT | Performed by: INTERNAL MEDICINE

## 2025-07-23 PROCEDURE — 36415 COLL VENOUS BLD VENIPUNCTURE: CPT | Performed by: INTERNAL MEDICINE

## 2025-07-23 PROCEDURE — 3079F DIAST BP 80-89 MM HG: CPT | Performed by: INTERNAL MEDICINE

## 2025-07-23 PROCEDURE — 3075F SYST BP GE 130 - 139MM HG: CPT | Performed by: INTERNAL MEDICINE

## 2025-07-23 ASSESSMENT — PAIN SCALES - GENERAL: PAINLEVEL_OUTOF10: MODERATE PAIN (6)

## 2025-07-23 NOTE — PATIENT INSTRUCTIONS
Labs and EKG today    2. Post op if develop constipation on narcotics, use Miralax laxative.    3. Morning of surgery: take Omeprazole with small sip of water. Hold everything else.    4. Clot prophylaxis: would recommend something stronger then aspirin due to weight and arthritis at the hip/decreased mobility.

## 2025-07-23 NOTE — PROGRESS NOTES
Preoperative Evaluation  [facility]  [address] [place] [address]  Phone: [phone]  Fax: [phone]  Primary Provider: Melia Lambert MD  Pre-op Performing Provider: Melia Lambert MD  Jul 23, 2025 7/21/2025   Surgical Information   What procedure is being done? Knee Replacement   Facility or Hospital where procedure/surgery will be performed: [facility]   Who is doing the procedure / surgery? Dr Walker   Date of surgery / procedure: 08/06/25   Time of surgery / procedure: TBD   Where do you plan to recover after surgery? at home with family     Fax number for surgical facility: Note does not need to be faxed, will be available electronically in Epic.    Assessment & Plan     The proposed surgical procedure is considered INTERMEDIATE risk.    Pre-op exam  She is medically stable for this surgery, will check preop labs and EKG  - EKG 12-lead, tracing only  - CBC with platelets and differential    Arthritis of knee  She will have knee replacement done.  - EKG 12-lead, tracing only  - CBC with platelets and differential    Morbid obesity (H)  BMI is 41, she also has history of significant right hip arthritis and uses a walker for mobility.  - EKG 12-lead, tracing only    Stage 3b chronic kidney disease (H)  Will check kidney function preoperatively  - Albumin Random Urine Quantitative with Creat Ratio  - Parathyroid Hormone Intact  - CBC with platelets and differential    Primary hypertension  Blood pressure is well-controlled on senna Lashae-hydrochlorothiazide, okay to hold morning of surgery  - EKG 12-lead, tracing only  - CBC with platelets and differential  - Basic metabolic panel  (Ca, Cl, CO2, Creat, Gluc, K, Na, BUN)    Screening for diabetes mellitus  - Hemoglobin A1c    SG (obstructive sleep apnea)  Unable to tolerate CPAP    Gastroesophageal reflux disease without esophagitis  Okay to take omeprazole morning of  surgery    Hiatal hernia  As above    At risk for deep venous thrombosis  Due to morbid obesity and additional hip arthritis, use of walker and decreased mobility she would be at high risk for clots after surgery, would consider using Xarelto for DVT prophylaxis for 35 days postoperatively rather than aspirin but defer to surgeon.      Recommendation  Approval given to proceed with proposed procedure, without further diagnostic evaluation.      Subjective   Leeann is a 69 year old, presenting for the following:  Pre-Op Exam (Pt here for pre op exam - RTKA/Date: 8/6/2025 Time:  9:20 AM Status: Scheduled/Location: [facility] Main OR Room: [place] [address] Service: Orthopedics/Patient class: Same Day Surgery Case classification: Elective    //)          7/23/2025     9:04 AM   Additional Questions   Roomed by Sue DYER RN   Accompanied by self         7/23/2025   Forms   Any forms needing to be completed Yes         7/23/2025     9:04 AM   Patient Reported Additional Medications   Patient reports taking the following new medications none     HPI:     Leeann is a 69 year old female  with history of increased BMI, high blood pressure, osteopenia, osteoarthritis in her knees, acid reflux, colon polyps, cervical polyp, decreased hearing/aids and vitamin D deficiency, obstructive sleep apnea, questionable Schwartz's esophagus, hiatal hernia, obstructive sleep apnea (unable to tolerate CPAP, right hip arthritis and right knee arthritis, cervical polyps.  She is currently here for preop evaluation for right knee replacement surgery.     Previous surgical history significant for cholecystectomy in 2017.  She denies any problems with sedation or anesthesia, no history of bleeding or clotting problems.  No family history of such.    No history of heart disease, Leeann does have hypertension which is well-controlled on lisinopril-hydrochlorothiazide.  She is not very physically active due to arthritis in her hip and knee and does not walk much  but when she does go up and down the steps denies any chest pains palpitations or shortness of breath.  No swelling in her legs.    No history of asthma or smoking.  She was diagnosed with sleep apnea in the past but unable to tolerate CPAP.  Currently denies any recent upper respiratory infections fevers chills sore throat or cough.            7/21/2025   Pre-Op Questionnaire   Have you ever had a heart attack or stroke? No   Have you ever had surgery on your heart or blood vessels, such as a stent placement, a coronary artery bypass, or surgery on an artery in your head, neck, heart, or legs? No   Do you have chest pain with activity? No   Do you have a history of heart failure? No   Do you currently have a cold, bronchitis or symptoms of other infection? No   Do you have a cough, shortness of breath, or wheezing? No   Do you or anyone in your family have previous history of blood clots? No   Do you or does anyone in your family have a serious bleeding problem such as prolonged bleeding following surgeries or cuts? No   Have you ever had problems with anemia or been told to take iron pills? (!) YES    Have you had any abnormal blood loss such as black, tarry or bloody stools, or abnormal vaginal bleeding? (!) YES    Have you ever had a blood transfusion? No   Are you willing to have a blood transfusion if it is medically needed before, during, or after your surgery? Yes   Have you or any of your relatives ever had problems with anesthesia? No   Do you have sleep apnea, excessive snoring or daytime drowsiness? (!) YES   Do you have a CPAP machine? (!) NO    Do you have any artifical heart valves or other implanted medical devices like a pacemaker, defibrillator, or continuous glucose monitor? No   Do you have artificial joints? No   Are you allergic to latex? No       Patient Active Problem List    Diagnosis Date Noted    Body mass index (BMI) 45.0-49.9, adult (H) 05/08/2023     Priority: Medium    Stage 3b chronic  kidney disease (H) 10/20/2021     Priority: Medium    Hypertension 03/08/2021     Priority: Medium     Created by Conversion  Replacement Utility updated for latest IMO load      Formatting of this note might be different from the original.  Created by Conversion    Replacement Utility updated for latest IMO load  Formatting of this note might be different from the original.  Formatting of this note might be different from the original.  Created by Conversion    Replacement Utility updated for latest IMO load      Arthritis 03/08/2021     Priority: Medium     Created by Conversion  Replacement Utility updated for latest IMO load      Formatting of this note might be different from the original.  Created by Conversion    Replacement Utility updated for latest IMO load  Formatting of this note might be different from the original.  Formatting of this note might be different from the original.  Created by Conversion    Replacement Utility updated for latest IMO load      Anemia 03/08/2021     Priority: Medium     Formatting of this note might be different from the original.  secondary to menorrhagia and fibroids  Last period as of 11/2012 was 5/2012  Formatting of this note might be different from the original.  secondary to menorrhagia and fibroids  Last period as of 11/2012 was 5/2012      Fatigue 03/08/2021     Priority: Medium    Hypercholesterolemia 03/08/2021     Priority: Medium     Formatting of this note might be different from the original.  LDL goal < 130.    11/2012 --  -- asked her to make an appointment in next 2 weeks.  Formatting of this note might be different from the original.  LDL goal < 130.    11/2012 --  -- asked her to make an appointment in next 2 weeks.      Hearing loss 11/11/2016     Priority: Medium    Osteopenia      Priority: Medium     Created by Conversion  Replacement Utility updated for latest IMO load        Adenomatous colon polyp 11/11/2015     Priority: Medium     Obesity      Priority: Medium     Created by Conversion        Esophageal Reflux      Priority: Medium     Created by Conversion        Menorrhagia 11/09/2010     Priority: Medium     Formatting of this note might be different from the original.  Secondary to fibroids -- was on oral contraceptives which helped.  Stopped 1/2010  Formatting of this note might be different from the original.  Secondary to fibroids -- was on oral contraceptives which helped.  Stopped 1/2010      Schwartz's esophagus 03/27/2001     Priority: Medium     Formatting of this note might be different from the original.  Referred for EGD 2010  EGD 1/2011 normal -- [facility]  Formatting of this note might be different from the original.  Referred for EGD 2010  EGD 1/2011 normal -- [facility]      Obesity 03/30/1999     Priority: Medium     Formatting of this note might be different from the original.  Created by Conversion        Past Medical History:   Diagnosis Date    Acid reflux     Arthritis     knee    Cholelithiasis     Essential hypertension     Obese     Renal disease     Sleep apnea     Uncomplicated asthma      Past Surgical History:   Procedure Laterality Date    HC REMOVE TONSILS/ADENOIDS,<13 Y/O      Description: Tonsillectomy With Adenoidectomy;  Recorded: 09/27/2013;    LAPAROSCOPIC CHOLECYSTECTOMY N/A 8/2/2017    Procedure: CHOLECYSTECTOMY, LAPAROSCOPIC;  Surgeon: Dawson Proctor MD;  Location: [facility];  Service:      Current Outpatient Medications   Medication Sig Dispense Refill    acetaminophen (TYLENOL) 500 MG tablet Take 500 mg by mouth daily as needed      albuterol (PROAIR HFA/PROVENTIL HFA/VENTOLIN HFA) 108 (90 Base) MCG/ACT inhaler Inhale 2 puffs into the lungs every 6 hours as needed for shortness of breath, wheezing or cough 18 g 3    cholecalciferol, vitamin D3, (VITAMIN D3) 1,000 unit capsule [CHOLECALCIFEROL, VITAMIN D3, (VITAMIN D3) 1,000 UNIT CAPSULE] Take 4,000 Units by mouth  daily.       cyanocobalamin, vitamin B-12, 2,500 mcg Chew [CYANOCOBALAMIN, VITAMIN B-12, 2,500 MCG CHEW] Chew.      lisinopril-hydrochlorothiazide (ZESTORETIC) 20-25 MG tablet Take 2 tablets by mouth daily. 180 tablet 3    montelukast (SINGULAIR) 10 mg tablet [MONTELUKAST (SINGULAIR) 10 MG TABLET] TAKE ONE TABLET ( 10 MG TOTAL) BY MOUTH AT BEDTIME 90 tablet 2    MULTIVITAMIN ORAL [MULTIVITAMIN ORAL] Take 1 tablet by mouth daily.      omeprazole (PRILOSEC) 20 MG DR capsule TAKE 1 CAPSULE (20 MG) BY MOUTH DAILY 90 capsule 2    rosuvastatin (CRESTOR) 10 MG tablet TAKE 1 TABLET (10 MG) BY MOUTH DAILY 90 tablet 3    meloxicam (MOBIC) 7.5 MG tablet Take 1 tablet (7.5 mg) by mouth daily 30 tablet 0    tirzepatide-Weight Management (ZEPBOUND) 2.5 MG/0.5ML prefilled pen Inject 0.5 mLs (2.5 mg) subcutaneously every 7 days. 2 mL 3       Allergies   Allergen Reactions    Doxycycline Nausea and Vomiting    Sulfa (Sulfonamide Antibiotics) [Sulfa Antibiotics] Itching and Rash        Social History     Tobacco Use    Smoking status: Never    Smokeless tobacco: Never   Substance Use Topics    Alcohol use: Yes     Comment: rarely     Family History   Problem Relation Age of Onset    Osteosarcoma Brother          age 31    Diabetes Mother          age 81    Anemia Father          age 88    Hyperlipidemia Sister     Breast Cancer No family hx of      History   Drug Use No           View of systems: As above, she does have occasional diarrhea but since she cut back on coffee it happens less often, she does have hiatal hernia and acid reflux which is well-controlled on a PPI, no urinary frequency urgency or dysuria.  She does use a walker due to pain in her right knee and hip.    Objective    /85 (BP Location: Left arm, Patient Position: Sitting, Cuff Size: Adult Regular)   Pulse 82   Temp 98  F (36.7  C) (Temporal)   Resp 16   Wt 99.7 kg (219 lb 11.2 oz)   LMP  (LMP Unknown)   BMI 41.51 kg/m     Estimated body  "mass index is 41.51 kg/m  as calculated from the following:    Height as of 6/25/25: 1.549 m (5' 1\").    Weight as of this encounter: 99.7 kg (219 lb 11.2 oz).  Physical Exam  General: well appearing female, alert and oriented x3  EYES: Eyelids, conjunctiva, and sclera were normal. Pupils were normal.   HEAD, EARS, NOSE, MOUTH, AND THROAT: no cervical LAD, no thyromegaly or nodules appreciated.  oropharynx is clear.  RESPIRATORY: respirations non labored, CTA bl, no wheezes, rales, no forced expiratory wheezing.  CARDIOVASCULAR: Heart rate and rhythm were normal. No murmurs, rubs,gallops. There was no peripheral edema. No carotid bruits.  GASTROINTESTINAL: Positive bowel sounds, abdomen is soft, non tender, non distended.     MUSCULOSKELETAL: Muscle mass was normal for age. No joint synovitis or deformity.  LYMPHATIC: There were no enlarged nodes palpable.  SKIN/HAIR/NAILS: Skin color was normal.  No rashes.  NEUROLOGIC: The patient was alert and oriented.  Speech was normal.  There is no facial asymmetry.   PSYCHIATRIC:  Mood and affect were normal.        Recent Labs   Lab Test 11/25/24  1106 11/25/24  1105   HGB 11.6*  --      --      --    POTASSIUM 3.7  --    CR 1.49*  --    A1C  --  5.5        Diagnostics  Labs pending at this time.  Results will be reviewed when available.   EKG: appears normal, NSR, normal axis, normal intervals, no acute ST/T changes c/w ischemia, no LVH by voltage criteria, unchanged from previous tracings    Revised Cardiac Risk Index (RCRI)  The patient has the following serious cardiovascular risks for perioperative complications:   - No serious cardiac risks = 0 points     RCRI Interpretation: 0 points: Class I (very low risk - 0.4% complication rate)         Signed Electronically by: Melia Lambert MD  A copy of this evaluation report is provided to the requesting physician.        "

## 2025-08-06 ENCOUNTER — APPOINTMENT (OUTPATIENT)
Dept: PHYSICAL THERAPY | Facility: CLINIC | Age: 70
End: 2025-08-06
Attending: ORTHOPAEDIC SURGERY
Payer: MEDICARE

## 2025-08-06 ENCOUNTER — ANESTHESIA (OUTPATIENT)
Dept: SURGERY | Facility: CLINIC | Age: 70
End: 2025-08-06
Payer: MEDICARE

## 2025-08-06 ENCOUNTER — ANESTHESIA EVENT (OUTPATIENT)
Dept: SURGERY | Facility: CLINIC | Age: 70
End: 2025-08-06
Payer: MEDICARE

## 2025-08-06 ENCOUNTER — HOSPITAL ENCOUNTER (OUTPATIENT)
Facility: CLINIC | Age: 70
End: 2025-08-06
Attending: ORTHOPAEDIC SURGERY | Admitting: ORTHOPAEDIC SURGERY
Payer: MEDICARE

## 2025-08-06 ENCOUNTER — APPOINTMENT (OUTPATIENT)
Dept: RADIOLOGY | Facility: CLINIC | Age: 70
End: 2025-08-06
Attending: PHYSICIAN ASSISTANT
Payer: MEDICARE

## 2025-08-06 DIAGNOSIS — M12.9 ARTHROPATHY: Primary | ICD-10-CM

## 2025-08-06 DIAGNOSIS — Z96.651 S/P TOTAL KNEE ARTHROPLASTY, RIGHT: ICD-10-CM

## 2025-08-06 PROBLEM — G47.33 OBSTRUCTIVE SLEEP APNEA SYNDROME: Status: ACTIVE | Noted: 2025-08-06

## 2025-08-06 PROBLEM — D25.9 UTERINE LEIOMYOMA: Status: ACTIVE | Noted: 2021-03-08

## 2025-08-06 PROBLEM — K44.9 DIAPHRAGMATIC HERNIA: Status: ACTIVE | Noted: 2025-03-05

## 2025-08-06 PROBLEM — K57.30 DIVERTICULAR DISEASE OF LARGE INTESTINE: Status: ACTIVE | Noted: 2021-02-16

## 2025-08-06 PROBLEM — N18.32 STAGE 3B CHRONIC KIDNEY DISEASE (H): Status: ACTIVE | Noted: 2021-10-20

## 2025-08-06 PROCEDURE — 258N000001 HC RX 258: Performed by: ORTHOPAEDIC SURGERY

## 2025-08-06 PROCEDURE — 120N000013 HC R&B IMCU

## 2025-08-06 PROCEDURE — 258N000003 HC RX IP 258 OP 636: Performed by: STUDENT IN AN ORGANIZED HEALTH CARE EDUCATION/TRAINING PROGRAM

## 2025-08-06 PROCEDURE — 250N000011 HC RX IP 250 OP 636: Performed by: STUDENT IN AN ORGANIZED HEALTH CARE EDUCATION/TRAINING PROGRAM

## 2025-08-06 PROCEDURE — 250N000013 HC RX MED GY IP 250 OP 250 PS 637: Performed by: FAMILY MEDICINE

## 2025-08-06 PROCEDURE — 370N000017 HC ANESTHESIA TECHNICAL FEE, PER MIN: Performed by: ORTHOPAEDIC SURGERY

## 2025-08-06 PROCEDURE — 250N000009 HC RX 250

## 2025-08-06 PROCEDURE — 999N000141 HC STATISTIC PRE-PROCEDURE NURSING ASSESSMENT: Performed by: ORTHOPAEDIC SURGERY

## 2025-08-06 PROCEDURE — 258N000003 HC RX IP 258 OP 636

## 2025-08-06 PROCEDURE — 360N000077 HC SURGERY LEVEL 4, PER MIN: Performed by: ORTHOPAEDIC SURGERY

## 2025-08-06 PROCEDURE — 272N000001 HC OR GENERAL SUPPLY STERILE: Performed by: ORTHOPAEDIC SURGERY

## 2025-08-06 PROCEDURE — 258N000003 HC RX IP 258 OP 636: Performed by: NURSE ANESTHETIST, CERTIFIED REGISTERED

## 2025-08-06 PROCEDURE — 250N000009 HC RX 250: Performed by: NURSE ANESTHETIST, CERTIFIED REGISTERED

## 2025-08-06 PROCEDURE — 97161 PT EVAL LOW COMPLEX 20 MIN: CPT | Mod: GP

## 2025-08-06 PROCEDURE — C1713 ANCHOR/SCREW BN/BN,TIS/BN: HCPCS | Performed by: ORTHOPAEDIC SURGERY

## 2025-08-06 PROCEDURE — 250N000011 HC RX IP 250 OP 636: Performed by: NURSE ANESTHETIST, CERTIFIED REGISTERED

## 2025-08-06 PROCEDURE — 250N000025 HC SEVOFLURANE, PER MIN: Performed by: ORTHOPAEDIC SURGERY

## 2025-08-06 PROCEDURE — 97116 GAIT TRAINING THERAPY: CPT | Mod: GP

## 2025-08-06 PROCEDURE — C1776 JOINT DEVICE (IMPLANTABLE): HCPCS | Performed by: ORTHOPAEDIC SURGERY

## 2025-08-06 PROCEDURE — 250N000009 HC RX 250: Performed by: STUDENT IN AN ORGANIZED HEALTH CARE EDUCATION/TRAINING PROGRAM

## 2025-08-06 PROCEDURE — 250N000013 HC RX MED GY IP 250 OP 250 PS 637

## 2025-08-06 PROCEDURE — 710N000010 HC RECOVERY PHASE 1, LEVEL 2, PER MIN: Performed by: ORTHOPAEDIC SURGERY

## 2025-08-06 PROCEDURE — 64447 NJX AA&/STRD FEMORAL NRV IMG: CPT | Mod: XU

## 2025-08-06 PROCEDURE — 250N000011 HC RX IP 250 OP 636

## 2025-08-06 PROCEDURE — 250N000011 HC RX IP 250 OP 636: Performed by: PHYSICIAN ASSISTANT

## 2025-08-06 PROCEDURE — 97110 THERAPEUTIC EXERCISES: CPT | Mod: GP

## 2025-08-06 PROCEDURE — 250N000013 HC RX MED GY IP 250 OP 250 PS 637: Performed by: STUDENT IN AN ORGANIZED HEALTH CARE EDUCATION/TRAINING PROGRAM

## 2025-08-06 PROCEDURE — 250N000013 HC RX MED GY IP 250 OP 250 PS 637: Performed by: PHYSICIAN ASSISTANT

## 2025-08-06 PROCEDURE — 99222 1ST HOSP IP/OBS MODERATE 55: CPT | Performed by: FAMILY MEDICINE

## 2025-08-06 PROCEDURE — 73560 X-RAY EXAM OF KNEE 1 OR 2: CPT | Mod: RT

## 2025-08-06 DEVICE — IMP COMP FEM STRK TRIATHLN CR RT 3 5510-F-302: Type: IMPLANTABLE DEVICE | Site: KNEE | Status: FUNCTIONAL

## 2025-08-06 DEVICE — IMP INSERT BASEPLATE TIBIAL HOWM TRI 3 5521-B-300: Type: IMPLANTABLE DEVICE | Site: KNEE | Status: FUNCTIONAL

## 2025-08-06 DEVICE — BONE CEMENT SIMPLEX FULL DOSE 6191-1-001: Type: IMPLANTABLE DEVICE | Site: KNEE | Status: FUNCTIONAL

## 2025-08-06 DEVICE — PIN FIXATION SS FLUTE SQUARE L3.5 IN OD1/8 IN 7650-2038A: Type: IMPLANTABLE DEVICE | Site: KNEE | Status: FUNCTIONAL

## 2025-08-06 DEVICE — IMPLANTABLE DEVICE: Type: IMPLANTABLE DEVICE | Site: KNEE | Status: FUNCTIONAL

## 2025-08-06 RX ORDER — ACETAMINOPHEN 325 MG/1
650 TABLET ORAL
Status: DISCONTINUED | OUTPATIENT
Start: 2025-08-06 | End: 2025-08-08 | Stop reason: HOSPADM

## 2025-08-06 RX ORDER — HYDROMORPHONE HCL IN WATER/PF 6 MG/30 ML
0.2 PATIENT CONTROLLED ANALGESIA SYRINGE INTRAVENOUS EVERY 5 MIN PRN
Status: DISCONTINUED | OUTPATIENT
Start: 2025-08-06 | End: 2025-08-06 | Stop reason: HOSPADM

## 2025-08-06 RX ORDER — FENTANYL CITRATE 50 UG/ML
25 INJECTION, SOLUTION INTRAMUSCULAR; INTRAVENOUS EVERY 5 MIN PRN
Status: DISCONTINUED | OUTPATIENT
Start: 2025-08-06 | End: 2025-08-06 | Stop reason: HOSPADM

## 2025-08-06 RX ORDER — OXYCODONE HYDROCHLORIDE 5 MG/1
5-10 TABLET ORAL EVERY 4 HOURS PRN
Qty: 30 TABLET | Refills: 0 | Status: SHIPPED | OUTPATIENT
Start: 2025-08-06 | End: 2025-08-08

## 2025-08-06 RX ORDER — AMOXICILLIN 250 MG
1 CAPSULE ORAL 2 TIMES DAILY
Status: DISCONTINUED | OUTPATIENT
Start: 2025-08-06 | End: 2025-08-08 | Stop reason: HOSPADM

## 2025-08-06 RX ORDER — TRANEXAMIC ACID 650 MG/1
1950 TABLET ORAL ONCE
Status: COMPLETED | OUTPATIENT
Start: 2025-08-06 | End: 2025-08-06

## 2025-08-06 RX ORDER — LIDOCAINE 40 MG/G
CREAM TOPICAL
Status: DISCONTINUED | OUTPATIENT
Start: 2025-08-06 | End: 2025-08-06 | Stop reason: HOSPADM

## 2025-08-06 RX ORDER — OXYCODONE HYDROCHLORIDE 5 MG/1
5 TABLET ORAL
Status: DISCONTINUED | OUTPATIENT
Start: 2025-08-06 | End: 2025-08-06

## 2025-08-06 RX ORDER — HYDROXYZINE HYDROCHLORIDE 10 MG/1
10 TABLET, FILM COATED ORAL EVERY 6 HOURS PRN
Qty: 30 TABLET | Refills: 0 | Status: SHIPPED | OUTPATIENT
Start: 2025-08-06 | End: 2025-08-08

## 2025-08-06 RX ORDER — HYDROMORPHONE HCL IN WATER/PF 6 MG/30 ML
0.2 PATIENT CONTROLLED ANALGESIA SYRINGE INTRAVENOUS EVERY 4 HOURS PRN
Status: DISCONTINUED | OUTPATIENT
Start: 2025-08-06 | End: 2025-08-08 | Stop reason: HOSPADM

## 2025-08-06 RX ORDER — FENTANYL CITRATE 50 UG/ML
25-100 INJECTION, SOLUTION INTRAMUSCULAR; INTRAVENOUS
Refills: 0 | Status: DISCONTINUED | OUTPATIENT
Start: 2025-08-06 | End: 2025-08-06 | Stop reason: HOSPADM

## 2025-08-06 RX ORDER — DEXAMETHASONE SODIUM PHOSPHATE 4 MG/ML
4 INJECTION, SOLUTION INTRA-ARTICULAR; INTRALESIONAL; INTRAMUSCULAR; INTRAVENOUS; SOFT TISSUE
Status: DISCONTINUED | OUTPATIENT
Start: 2025-08-06 | End: 2025-08-06 | Stop reason: HOSPADM

## 2025-08-06 RX ORDER — PANTOPRAZOLE SODIUM 40 MG/1
40 TABLET, DELAYED RELEASE ORAL EVERY MORNING
Status: DISCONTINUED | OUTPATIENT
Start: 2025-08-07 | End: 2025-08-08 | Stop reason: HOSPADM

## 2025-08-06 RX ORDER — ROSUVASTATIN CALCIUM 10 MG/1
10 TABLET, COATED ORAL EVERY OTHER DAY
Status: DISCONTINUED | OUTPATIENT
Start: 2025-08-07 | End: 2025-08-08 | Stop reason: HOSPADM

## 2025-08-06 RX ORDER — OXYCODONE HYDROCHLORIDE 5 MG/1
10 TABLET ORAL
Status: DISCONTINUED | OUTPATIENT
Start: 2025-08-06 | End: 2025-08-06 | Stop reason: HOSPADM

## 2025-08-06 RX ORDER — ALBUTEROL SULFATE 90 UG/1
2 INHALANT RESPIRATORY (INHALATION) EVERY 6 HOURS PRN
Status: DISCONTINUED | OUTPATIENT
Start: 2025-08-06 | End: 2025-08-08 | Stop reason: HOSPADM

## 2025-08-06 RX ORDER — NALOXONE HYDROCHLORIDE 0.4 MG/ML
0.4 INJECTION, SOLUTION INTRAMUSCULAR; INTRAVENOUS; SUBCUTANEOUS
Status: DISCONTINUED | OUTPATIENT
Start: 2025-08-06 | End: 2025-08-08 | Stop reason: HOSPADM

## 2025-08-06 RX ORDER — ASPIRIN 81 MG/1
81 TABLET ORAL 2 TIMES DAILY
Status: DISCONTINUED | OUTPATIENT
Start: 2025-08-06 | End: 2025-08-08 | Stop reason: HOSPADM

## 2025-08-06 RX ORDER — LIDOCAINE 40 MG/G
CREAM TOPICAL
Status: DISCONTINUED | OUTPATIENT
Start: 2025-08-06 | End: 2025-08-08 | Stop reason: HOSPADM

## 2025-08-06 RX ORDER — CEFAZOLIN SODIUM/WATER 2 G/20 ML
2 SYRINGE (ML) INTRAVENOUS SEE ADMIN INSTRUCTIONS
Status: DISCONTINUED | OUTPATIENT
Start: 2025-08-06 | End: 2025-08-06 | Stop reason: HOSPADM

## 2025-08-06 RX ORDER — ACETAMINOPHEN 325 MG/1
975 TABLET ORAL ONCE
Status: COMPLETED | OUTPATIENT
Start: 2025-08-06 | End: 2025-08-06

## 2025-08-06 RX ORDER — BUPIVACAINE HYDROCHLORIDE 5 MG/ML
INJECTION, SOLUTION EPIDURAL; INTRACAUDAL; PERINEURAL
Status: COMPLETED | OUTPATIENT
Start: 2025-08-06 | End: 2025-08-06

## 2025-08-06 RX ORDER — ONDANSETRON 4 MG/1
4 TABLET, ORALLY DISINTEGRATING ORAL EVERY 30 MIN PRN
Status: DISCONTINUED | OUTPATIENT
Start: 2025-08-06 | End: 2025-08-06 | Stop reason: HOSPADM

## 2025-08-06 RX ORDER — ACETAMINOPHEN 325 MG/1
975 TABLET ORAL EVERY 8 HOURS
Status: DISCONTINUED | OUTPATIENT
Start: 2025-08-06 | End: 2025-08-08 | Stop reason: HOSPADM

## 2025-08-06 RX ORDER — NALOXONE HYDROCHLORIDE 0.4 MG/ML
0.1 INJECTION, SOLUTION INTRAMUSCULAR; INTRAVENOUS; SUBCUTANEOUS
Status: DISCONTINUED | OUTPATIENT
Start: 2025-08-06 | End: 2025-08-06 | Stop reason: HOSPADM

## 2025-08-06 RX ORDER — PROPOFOL 10 MG/ML
INJECTION, EMULSION INTRAVENOUS CONTINUOUS PRN
Status: DISCONTINUED | OUTPATIENT
Start: 2025-08-06 | End: 2025-08-06

## 2025-08-06 RX ORDER — OXYCODONE HYDROCHLORIDE 5 MG/1
5 TABLET ORAL EVERY 4 HOURS PRN
Status: DISCONTINUED | OUTPATIENT
Start: 2025-08-06 | End: 2025-08-08 | Stop reason: HOSPADM

## 2025-08-06 RX ORDER — SODIUM CHLORIDE, SODIUM LACTATE, POTASSIUM CHLORIDE, CALCIUM CHLORIDE 600; 310; 30; 20 MG/100ML; MG/100ML; MG/100ML; MG/100ML
INJECTION, SOLUTION INTRAVENOUS CONTINUOUS
Status: DISCONTINUED | OUTPATIENT
Start: 2025-08-06 | End: 2025-08-08 | Stop reason: HOSPADM

## 2025-08-06 RX ORDER — SODIUM CHLORIDE, SODIUM LACTATE, POTASSIUM CHLORIDE, CALCIUM CHLORIDE 600; 310; 30; 20 MG/100ML; MG/100ML; MG/100ML; MG/100ML
INJECTION, SOLUTION INTRAVENOUS CONTINUOUS
Status: DISCONTINUED | OUTPATIENT
Start: 2025-08-06 | End: 2025-08-06 | Stop reason: HOSPADM

## 2025-08-06 RX ORDER — SENNOSIDES 8.6 MG
1300 CAPSULE ORAL EVERY 8 HOURS PRN
Status: ON HOLD | COMMUNITY
End: 2025-08-06

## 2025-08-06 RX ORDER — POLYETHYLENE GLYCOL 3350 17 G/17G
17 POWDER, FOR SOLUTION ORAL DAILY
Status: DISCONTINUED | OUTPATIENT
Start: 2025-08-07 | End: 2025-08-08 | Stop reason: HOSPADM

## 2025-08-06 RX ORDER — ONDANSETRON 2 MG/ML
4 INJECTION INTRAMUSCULAR; INTRAVENOUS EVERY 30 MIN PRN
Status: DISCONTINUED | OUTPATIENT
Start: 2025-08-06 | End: 2025-08-06 | Stop reason: HOSPADM

## 2025-08-06 RX ORDER — CEFAZOLIN SODIUM 2 G/50ML
2 SOLUTION INTRAVENOUS EVERY 8 HOURS
Status: COMPLETED | OUTPATIENT
Start: 2025-08-06 | End: 2025-08-07

## 2025-08-06 RX ORDER — AMOXICILLIN 250 MG
1-2 CAPSULE ORAL 2 TIMES DAILY
Qty: 30 TABLET | Refills: 0 | Status: SHIPPED | OUTPATIENT
Start: 2025-08-06 | End: 2025-08-08

## 2025-08-06 RX ORDER — HYDRALAZINE HYDROCHLORIDE 20 MG/ML
10 INJECTION INTRAMUSCULAR; INTRAVENOUS EVERY 6 HOURS PRN
Status: DISCONTINUED | OUTPATIENT
Start: 2025-08-06 | End: 2025-08-08 | Stop reason: HOSPADM

## 2025-08-06 RX ORDER — ACETAMINOPHEN 325 MG/1
650 TABLET ORAL EVERY 4 HOURS PRN
Qty: 50 TABLET | Refills: 0 | Status: SHIPPED | OUTPATIENT
Start: 2025-08-06 | End: 2025-08-08

## 2025-08-06 RX ORDER — NALOXONE HYDROCHLORIDE 0.4 MG/ML
0.2 INJECTION, SOLUTION INTRAMUSCULAR; INTRAVENOUS; SUBCUTANEOUS
Status: DISCONTINUED | OUTPATIENT
Start: 2025-08-06 | End: 2025-08-08 | Stop reason: HOSPADM

## 2025-08-06 RX ORDER — ASPIRIN 81 MG/1
81 TABLET ORAL 2 TIMES DAILY
Qty: 60 TABLET | Refills: 0 | Status: SHIPPED | OUTPATIENT
Start: 2025-08-06 | End: 2025-08-08

## 2025-08-06 RX ORDER — LANOLIN ALCOHOL/MO/W.PET/CERES
1000 CREAM (GRAM) TOPICAL DAILY
Status: DISCONTINUED | OUTPATIENT
Start: 2025-08-06 | End: 2025-08-08 | Stop reason: HOSPADM

## 2025-08-06 RX ORDER — CEFAZOLIN SODIUM/WATER 2 G/20 ML
2 SYRINGE (ML) INTRAVENOUS
Status: COMPLETED | OUTPATIENT
Start: 2025-08-06 | End: 2025-08-06

## 2025-08-06 RX ORDER — FLUMAZENIL 0.1 MG/ML
0.2 INJECTION, SOLUTION INTRAVENOUS
Status: DISCONTINUED | OUTPATIENT
Start: 2025-08-06 | End: 2025-08-06 | Stop reason: HOSPADM

## 2025-08-06 RX ORDER — ONDANSETRON 4 MG/1
4 TABLET, ORALLY DISINTEGRATING ORAL EVERY 6 HOURS PRN
Status: DISCONTINUED | OUTPATIENT
Start: 2025-08-06 | End: 2025-08-08 | Stop reason: HOSPADM

## 2025-08-06 RX ORDER — PROCHLORPERAZINE MALEATE 5 MG/1
5 TABLET ORAL EVERY 6 HOURS PRN
Status: DISCONTINUED | OUTPATIENT
Start: 2025-08-06 | End: 2025-08-08 | Stop reason: HOSPADM

## 2025-08-06 RX ORDER — ROSUVASTATIN CALCIUM 10 MG/1
10 TABLET, COATED ORAL EVERY OTHER DAY
COMMUNITY

## 2025-08-06 RX ORDER — LORAZEPAM 2 MG/ML
.5-1 INJECTION INTRAMUSCULAR
Status: DISCONTINUED | OUTPATIENT
Start: 2025-08-06 | End: 2025-08-06 | Stop reason: HOSPADM

## 2025-08-06 RX ORDER — HYDROMORPHONE HCL IN WATER/PF 6 MG/30 ML
0.4 PATIENT CONTROLLED ANALGESIA SYRINGE INTRAVENOUS EVERY 5 MIN PRN
Status: DISCONTINUED | OUTPATIENT
Start: 2025-08-06 | End: 2025-08-06 | Stop reason: HOSPADM

## 2025-08-06 RX ORDER — ONDANSETRON 2 MG/ML
4 INJECTION INTRAMUSCULAR; INTRAVENOUS EVERY 6 HOURS PRN
Status: DISCONTINUED | OUTPATIENT
Start: 2025-08-06 | End: 2025-08-08 | Stop reason: HOSPADM

## 2025-08-06 RX ORDER — FENTANYL CITRATE 50 UG/ML
50 INJECTION, SOLUTION INTRAMUSCULAR; INTRAVENOUS EVERY 5 MIN PRN
Status: DISCONTINUED | OUTPATIENT
Start: 2025-08-06 | End: 2025-08-06 | Stop reason: HOSPADM

## 2025-08-06 RX ORDER — BISACODYL 10 MG
10 SUPPOSITORY, RECTAL RECTAL DAILY PRN
Status: DISCONTINUED | OUTPATIENT
Start: 2025-08-06 | End: 2025-08-08 | Stop reason: HOSPADM

## 2025-08-06 RX ORDER — HYDROMORPHONE HCL IN WATER/PF 6 MG/30 ML
0.1 PATIENT CONTROLLED ANALGESIA SYRINGE INTRAVENOUS EVERY 4 HOURS PRN
Status: DISCONTINUED | OUTPATIENT
Start: 2025-08-06 | End: 2025-08-08 | Stop reason: HOSPADM

## 2025-08-06 RX ORDER — ONDANSETRON 2 MG/ML
INJECTION INTRAMUSCULAR; INTRAVENOUS PRN
Status: DISCONTINUED | OUTPATIENT
Start: 2025-08-06 | End: 2025-08-06

## 2025-08-06 RX ORDER — OXYCODONE HYDROCHLORIDE 5 MG/1
5 TABLET ORAL
Status: COMPLETED | OUTPATIENT
Start: 2025-08-06 | End: 2025-08-06

## 2025-08-06 RX ORDER — LISINOPRIL AND HYDROCHLOROTHIAZIDE 20; 25 MG/1; MG/1
2 TABLET ORAL DAILY
Status: DISCONTINUED | OUTPATIENT
Start: 2025-08-06 | End: 2025-08-08 | Stop reason: HOSPADM

## 2025-08-06 RX ADMIN — ACETAMINOPHEN 975 MG: 325 TABLET ORAL at 15:35

## 2025-08-06 RX ADMIN — PHENYLEPHRINE HYDROCHLORIDE 100 MCG: 10 INJECTION INTRAVENOUS at 09:57

## 2025-08-06 RX ADMIN — BUPIVACAINE HYDROCHLORIDE 20 ML: 5 INJECTION, SOLUTION EPIDURAL; INTRACAUDAL; PERINEURAL at 08:55

## 2025-08-06 RX ADMIN — DEXMEDETOMIDINE HYDROCHLORIDE 4 MCG: 100 INJECTION, SOLUTION INTRAVENOUS at 09:32

## 2025-08-06 RX ADMIN — SODIUM CHLORIDE, SODIUM LACTATE, POTASSIUM CHLORIDE, AND CALCIUM CHLORIDE: .6; .31; .03; .02 INJECTION, SOLUTION INTRAVENOUS at 11:09

## 2025-08-06 RX ADMIN — ONDANSETRON 4 MG: 2 INJECTION INTRAMUSCULAR; INTRAVENOUS at 09:49

## 2025-08-06 RX ADMIN — SODIUM CHLORIDE, SODIUM LACTATE, POTASSIUM CHLORIDE, AND CALCIUM CHLORIDE: .6; .31; .03; .02 INJECTION, SOLUTION INTRAVENOUS at 07:53

## 2025-08-06 RX ADMIN — CYANOCOBALAMIN TAB 1000 MCG 1000 MCG: 1000 TAB at 17:34

## 2025-08-06 RX ADMIN — OXYCODONE HYDROCHLORIDE 2.5 MG: 5 TABLET ORAL at 14:14

## 2025-08-06 RX ADMIN — OXYCODONE HYDROCHLORIDE 2.5 MG: 5 TABLET ORAL at 19:51

## 2025-08-06 RX ADMIN — OXYCODONE HYDROCHLORIDE 5 MG: 5 TABLET ORAL at 12:14

## 2025-08-06 RX ADMIN — FENTANYL CITRATE 50 MCG: 50 INJECTION INTRAMUSCULAR; INTRAVENOUS at 11:55

## 2025-08-06 RX ADMIN — LIDOCAINE HYDROCHLORIDE 30 MG: 10 INJECTION, SOLUTION EPIDURAL; INFILTRATION; INTRACAUDAL; PERINEURAL at 09:21

## 2025-08-06 RX ADMIN — ACETAMINOPHEN 975 MG: 325 TABLET ORAL at 23:46

## 2025-08-06 RX ADMIN — SENNOSIDES AND DOCUSATE SODIUM 1 TABLET: 50; 8.6 TABLET ORAL at 14:14

## 2025-08-06 RX ADMIN — ACETAMINOPHEN 975 MG: 325 TABLET ORAL at 07:29

## 2025-08-06 RX ADMIN — PROPOFOL 75 MCG/KG/MIN: 10 INJECTION, EMULSION INTRAVENOUS at 09:21

## 2025-08-06 RX ADMIN — DEXMEDETOMIDINE HYDROCHLORIDE 8 MCG: 100 INJECTION, SOLUTION INTRAVENOUS at 10:11

## 2025-08-06 RX ADMIN — ASPIRIN 81 MG: 81 TABLET, COATED ORAL at 19:52

## 2025-08-06 RX ADMIN — Medication 2 G: at 09:12

## 2025-08-06 RX ADMIN — MEPIVACAINE HYDROCHLORIDE 3 ML: 15 INJECTION, SOLUTION EPIDURAL; INFILTRATION at 09:21

## 2025-08-06 RX ADMIN — PHENYLEPHRINE HYDROCHLORIDE 100 MCG: 10 INJECTION INTRAVENOUS at 09:34

## 2025-08-06 RX ADMIN — PHENYLEPHRINE HYDROCHLORIDE 0.2 MCG/KG/MIN: 10 INJECTION INTRAVENOUS at 09:27

## 2025-08-06 RX ADMIN — FENTANYL CITRATE 50 MCG: 50 INJECTION INTRAMUSCULAR; INTRAVENOUS at 11:37

## 2025-08-06 RX ADMIN — DEXMEDETOMIDINE HYDROCHLORIDE 8 MCG: 100 INJECTION, SOLUTION INTRAVENOUS at 09:22

## 2025-08-06 RX ADMIN — PHENYLEPHRINE HYDROCHLORIDE 100 MCG: 10 INJECTION INTRAVENOUS at 09:39

## 2025-08-06 RX ADMIN — MIDAZOLAM HYDROCHLORIDE 2 MG: 1 INJECTION, SOLUTION INTRAMUSCULAR; INTRAVENOUS at 08:53

## 2025-08-06 RX ADMIN — TRANEXAMIC ACID 1950 MG: 650 TABLET ORAL at 07:29

## 2025-08-06 RX ADMIN — PHENYLEPHRINE HYDROCHLORIDE 100 MCG: 10 INJECTION INTRAVENOUS at 09:28

## 2025-08-06 RX ADMIN — SENNOSIDES AND DOCUSATE SODIUM 1 TABLET: 50; 8.6 TABLET ORAL at 19:52

## 2025-08-06 RX ADMIN — FENTANYL CITRATE 100 MCG: 50 INJECTION INTRAMUSCULAR; INTRAVENOUS at 08:53

## 2025-08-06 RX ADMIN — CEFAZOLIN SODIUM 2 G: 2 SOLUTION INTRAVENOUS at 17:34

## 2025-08-06 RX ADMIN — PHENYLEPHRINE HYDROCHLORIDE 100 MCG: 10 INJECTION INTRAVENOUS at 09:47

## 2025-08-06 RX ADMIN — OXYCODONE HYDROCHLORIDE 5 MG: 5 TABLET ORAL at 23:46

## 2025-08-06 RX ADMIN — ASPIRIN 81 MG: 81 TABLET, COATED ORAL at 14:14

## 2025-08-06 ASSESSMENT — ACTIVITIES OF DAILY LIVING (ADL)
ADLS_ACUITY_SCORE: 34
ADLS_ACUITY_SCORE: 34
ADLS_ACUITY_SCORE: 24
ADLS_ACUITY_SCORE: 27
ADLS_ACUITY_SCORE: 24
ADLS_ACUITY_SCORE: 34
ADLS_ACUITY_SCORE: 34
ADLS_ACUITY_SCORE: 24
ADLS_ACUITY_SCORE: 30
ADLS_ACUITY_SCORE: 30
ADLS_ACUITY_SCORE: 34
ADLS_ACUITY_SCORE: 34
ADLS_ACUITY_SCORE: 24
ADLS_ACUITY_SCORE: 27
ADLS_ACUITY_SCORE: 24
ADLS_ACUITY_SCORE: 30
ADLS_ACUITY_SCORE: 34

## 2025-08-06 ASSESSMENT — LIFESTYLE VARIABLES: TOBACCO_USE: 0

## 2025-08-07 ENCOUNTER — APPOINTMENT (OUTPATIENT)
Dept: PHYSICAL THERAPY | Facility: CLINIC | Age: 70
End: 2025-08-07
Attending: PHYSICIAN ASSISTANT
Payer: MEDICARE

## 2025-08-07 ENCOUNTER — APPOINTMENT (OUTPATIENT)
Dept: OCCUPATIONAL THERAPY | Facility: CLINIC | Age: 70
End: 2025-08-07
Attending: ORTHOPAEDIC SURGERY
Payer: MEDICARE

## 2025-08-07 VITALS
OXYGEN SATURATION: 98 % | BODY MASS INDEX: 41.72 KG/M2 | HEART RATE: 92 BPM | TEMPERATURE: 97.4 F | RESPIRATION RATE: 20 BRPM | WEIGHT: 221 LBS | SYSTOLIC BLOOD PRESSURE: 102 MMHG | HEIGHT: 61 IN | DIASTOLIC BLOOD PRESSURE: 58 MMHG

## 2025-08-07 PROBLEM — Z96.651 TOTAL KNEE REPLACEMENT STATUS, RIGHT: Status: ACTIVE | Noted: 2025-08-07

## 2025-08-07 LAB
ANION GAP SERPL CALCULATED.3IONS-SCNC: 12 MMOL/L (ref 7–15)
BUN SERPL-MCNC: 23.8 MG/DL (ref 8–23)
CALCIUM SERPL-MCNC: 9.8 MG/DL (ref 8.8–10.4)
CHLORIDE SERPL-SCNC: 105 MMOL/L (ref 98–107)
CREAT SERPL-MCNC: 1.13 MG/DL (ref 0.51–0.95)
EGFRCR SERPLBLD CKD-EPI 2021: 53 ML/MIN/1.73M2
GLUCOSE SERPL-MCNC: 104 MG/DL (ref 70–99)
HCO3 SERPL-SCNC: 22 MMOL/L (ref 22–29)
HGB BLD-MCNC: 9.7 G/DL (ref 11.7–15.7)
MCV RBC AUTO: 98 FL (ref 78–100)
POTASSIUM SERPL-SCNC: 4.2 MMOL/L (ref 3.4–5.3)
SODIUM SERPL-SCNC: 139 MMOL/L (ref 135–145)

## 2025-08-07 PROCEDURE — 250N000013 HC RX MED GY IP 250 OP 250 PS 637: Performed by: PHYSICIAN ASSISTANT

## 2025-08-07 PROCEDURE — 97116 GAIT TRAINING THERAPY: CPT | Mod: GP

## 2025-08-07 PROCEDURE — 99232 SBSQ HOSP IP/OBS MODERATE 35: CPT | Performed by: FAMILY MEDICINE

## 2025-08-07 PROCEDURE — 97150 GROUP THERAPEUTIC PROCEDURES: CPT | Mod: GP

## 2025-08-07 PROCEDURE — 80048 BASIC METABOLIC PNL TOTAL CA: CPT | Performed by: FAMILY MEDICINE

## 2025-08-07 PROCEDURE — 250N000011 HC RX IP 250 OP 636: Performed by: PHYSICIAN ASSISTANT

## 2025-08-07 PROCEDURE — 250N000013 HC RX MED GY IP 250 OP 250 PS 637: Performed by: FAMILY MEDICINE

## 2025-08-07 PROCEDURE — 85018 HEMOGLOBIN: CPT | Performed by: PHYSICIAN ASSISTANT

## 2025-08-07 PROCEDURE — 250N000013 HC RX MED GY IP 250 OP 250 PS 637

## 2025-08-07 PROCEDURE — 97166 OT EVAL MOD COMPLEX 45 MIN: CPT | Mod: GO

## 2025-08-07 PROCEDURE — 36415 COLL VENOUS BLD VENIPUNCTURE: CPT | Performed by: FAMILY MEDICINE

## 2025-08-07 PROCEDURE — 97535 SELF CARE MNGMENT TRAINING: CPT | Mod: GO

## 2025-08-07 RX ORDER — METHOCARBAMOL 500 MG/1
250 TABLET ORAL 4 TIMES DAILY
Status: DISCONTINUED | OUTPATIENT
Start: 2025-08-07 | End: 2025-08-08 | Stop reason: HOSPADM

## 2025-08-07 RX ADMIN — SENNOSIDES AND DOCUSATE SODIUM 1 TABLET: 50; 8.6 TABLET ORAL at 21:15

## 2025-08-07 RX ADMIN — CEFAZOLIN SODIUM 2 G: 2 SOLUTION INTRAVENOUS at 00:15

## 2025-08-07 RX ADMIN — ACETAMINOPHEN 975 MG: 325 TABLET ORAL at 15:44

## 2025-08-07 RX ADMIN — OXYCODONE HYDROCHLORIDE 5 MG: 5 TABLET ORAL at 09:28

## 2025-08-07 RX ADMIN — POLYETHYLENE GLYCOL 3350 17 G: 17 POWDER, FOR SOLUTION ORAL at 09:16

## 2025-08-07 RX ADMIN — ACETAMINOPHEN 975 MG: 325 TABLET ORAL at 22:33

## 2025-08-07 RX ADMIN — SENNOSIDES AND DOCUSATE SODIUM 1 TABLET: 50; 8.6 TABLET ORAL at 09:15

## 2025-08-07 RX ADMIN — METHOCARBAMOL TABLETS 250 MG: 500 TABLET, COATED ORAL at 21:15

## 2025-08-07 RX ADMIN — METHOCARBAMOL TABLETS 250 MG: 500 TABLET, COATED ORAL at 13:17

## 2025-08-07 RX ADMIN — LISINOPRIL AND HYDROCHLOROTHIAZIDE 2 TABLET: 25; 20 TABLET ORAL at 09:16

## 2025-08-07 RX ADMIN — ASPIRIN 81 MG: 81 TABLET, COATED ORAL at 21:15

## 2025-08-07 RX ADMIN — ASPIRIN 81 MG: 81 TABLET, COATED ORAL at 09:15

## 2025-08-07 RX ADMIN — CYANOCOBALAMIN TAB 1000 MCG 1000 MCG: 1000 TAB at 09:15

## 2025-08-07 RX ADMIN — OXYCODONE HYDROCHLORIDE 5 MG: 5 TABLET ORAL at 05:03

## 2025-08-07 RX ADMIN — ROSUVASTATIN CALCIUM 10 MG: 10 TABLET, FILM COATED ORAL at 09:15

## 2025-08-07 RX ADMIN — OXYCODONE HYDROCHLORIDE 5 MG: 5 TABLET ORAL at 21:15

## 2025-08-07 RX ADMIN — OXYCODONE HYDROCHLORIDE 5 MG: 5 TABLET ORAL at 14:20

## 2025-08-07 RX ADMIN — PANTOPRAZOLE SODIUM 40 MG: 40 TABLET, DELAYED RELEASE ORAL at 09:15

## 2025-08-07 RX ADMIN — METHOCARBAMOL TABLETS 250 MG: 500 TABLET, COATED ORAL at 17:02

## 2025-08-07 RX ADMIN — ACETAMINOPHEN 975 MG: 325 TABLET ORAL at 09:15

## 2025-08-07 ASSESSMENT — ACTIVITIES OF DAILY LIVING (ADL)
ADLS_ACUITY_SCORE: 38
ADLS_ACUITY_SCORE: 34
ADLS_ACUITY_SCORE: 40
ADLS_ACUITY_SCORE: 34
ADLS_ACUITY_SCORE: 40
ADLS_ACUITY_SCORE: 34
ADLS_ACUITY_SCORE: 38
ADLS_ACUITY_SCORE: 34
ADLS_ACUITY_SCORE: 38

## 2025-08-08 ENCOUNTER — APPOINTMENT (OUTPATIENT)
Dept: PHYSICAL THERAPY | Facility: CLINIC | Age: 70
End: 2025-08-08
Attending: ORTHOPAEDIC SURGERY
Payer: MEDICARE

## 2025-08-08 VITALS
WEIGHT: 221 LBS | BODY MASS INDEX: 41.72 KG/M2 | DIASTOLIC BLOOD PRESSURE: 58 MMHG | HEART RATE: 98 BPM | HEIGHT: 61 IN | TEMPERATURE: 98 F | RESPIRATION RATE: 16 BRPM | OXYGEN SATURATION: 94 % | SYSTOLIC BLOOD PRESSURE: 116 MMHG

## 2025-08-08 LAB
FASTING STATUS PATIENT QL REPORTED: ABNORMAL
GLUCOSE SERPL-MCNC: 110 MG/DL (ref 70–99)
HGB BLD-MCNC: 9.8 G/DL (ref 11.7–15.7)
MCV RBC AUTO: 98 FL (ref 78–100)

## 2025-08-08 PROCEDURE — 250N000013 HC RX MED GY IP 250 OP 250 PS 637: Performed by: FAMILY MEDICINE

## 2025-08-08 PROCEDURE — 36415 COLL VENOUS BLD VENIPUNCTURE: CPT | Performed by: ORTHOPAEDIC SURGERY

## 2025-08-08 PROCEDURE — 250N000013 HC RX MED GY IP 250 OP 250 PS 637: Performed by: PHYSICIAN ASSISTANT

## 2025-08-08 PROCEDURE — 97116 GAIT TRAINING THERAPY: CPT | Mod: GP

## 2025-08-08 PROCEDURE — 99232 SBSQ HOSP IP/OBS MODERATE 35: CPT | Performed by: FAMILY MEDICINE

## 2025-08-08 PROCEDURE — 85018 HEMOGLOBIN: CPT | Performed by: PHYSICIAN ASSISTANT

## 2025-08-08 PROCEDURE — 97110 THERAPEUTIC EXERCISES: CPT | Mod: GP

## 2025-08-08 PROCEDURE — 82947 ASSAY GLUCOSE BLOOD QUANT: CPT | Performed by: ORTHOPAEDIC SURGERY

## 2025-08-08 PROCEDURE — 250N000013 HC RX MED GY IP 250 OP 250 PS 637

## 2025-08-08 PROCEDURE — L1930 AFO PLASTIC: HCPCS

## 2025-08-08 PROCEDURE — 250N000009 HC RX 250

## 2025-08-08 RX ORDER — AMOXICILLIN 250 MG
1 CAPSULE ORAL 2 TIMES DAILY
Status: SHIPPED
Start: 2025-08-08 | End: 2025-08-12

## 2025-08-08 RX ORDER — LIDOCAINE 50 MG/G
OINTMENT TOPICAL EVERY 4 HOURS PRN
Status: SHIPPED
Start: 2025-08-08

## 2025-08-08 RX ORDER — ACETAMINOPHEN 325 MG/1
650 TABLET ORAL EVERY 4 HOURS PRN
COMMUNITY
Start: 2025-08-08 | End: 2025-08-12

## 2025-08-08 RX ORDER — HYDROXYZINE HYDROCHLORIDE 10 MG/1
10 TABLET, FILM COATED ORAL EVERY 6 HOURS PRN
Status: SHIPPED
Start: 2025-08-08 | End: 2025-08-12

## 2025-08-08 RX ORDER — LIDOCAINE 50 MG/G
OINTMENT TOPICAL EVERY 4 HOURS PRN
Status: DISCONTINUED | OUTPATIENT
Start: 2025-08-08 | End: 2025-08-08 | Stop reason: HOSPADM

## 2025-08-08 RX ORDER — OXYCODONE HYDROCHLORIDE 5 MG/1
5 TABLET ORAL EVERY 4 HOURS PRN
Qty: 30 TABLET | Refills: 0 | Status: SHIPPED | OUTPATIENT
Start: 2025-08-08 | End: 2025-08-12

## 2025-08-08 RX ORDER — METHOCARBAMOL 500 MG/1
250 TABLET, FILM COATED ORAL 4 TIMES DAILY
Status: SHIPPED
Start: 2025-08-08

## 2025-08-08 RX ORDER — ASPIRIN 81 MG/1
81 TABLET ORAL 2 TIMES DAILY
Status: SHIPPED
Start: 2025-08-08 | End: 2025-08-12

## 2025-08-08 RX ADMIN — CYANOCOBALAMIN TAB 1000 MCG 1000 MCG: 1000 TAB at 08:08

## 2025-08-08 RX ADMIN — OXYCODONE HYDROCHLORIDE 5 MG: 5 TABLET ORAL at 13:36

## 2025-08-08 RX ADMIN — OXYCODONE HYDROCHLORIDE 2.5 MG: 5 TABLET ORAL at 09:35

## 2025-08-08 RX ADMIN — LIDOCAINE: 50 OINTMENT TOPICAL at 13:58

## 2025-08-08 RX ADMIN — METHOCARBAMOL TABLETS 250 MG: 500 TABLET, COATED ORAL at 12:37

## 2025-08-08 RX ADMIN — LISINOPRIL AND HYDROCHLOROTHIAZIDE 2 TABLET: 25; 20 TABLET ORAL at 08:08

## 2025-08-08 RX ADMIN — OXYCODONE HYDROCHLORIDE 5 MG: 5 TABLET ORAL at 00:44

## 2025-08-08 RX ADMIN — ACETAMINOPHEN 975 MG: 325 TABLET ORAL at 14:52

## 2025-08-08 RX ADMIN — PANTOPRAZOLE SODIUM 40 MG: 40 TABLET, DELAYED RELEASE ORAL at 08:08

## 2025-08-08 RX ADMIN — METHOCARBAMOL TABLETS 250 MG: 500 TABLET, COATED ORAL at 08:10

## 2025-08-08 RX ADMIN — OXYCODONE HYDROCHLORIDE 2.5 MG: 5 TABLET ORAL at 07:14

## 2025-08-08 RX ADMIN — ACETAMINOPHEN 975 MG: 325 TABLET ORAL at 07:14

## 2025-08-08 RX ADMIN — ASPIRIN 81 MG: 81 TABLET, COATED ORAL at 08:08

## 2025-08-08 ASSESSMENT — ACTIVITIES OF DAILY LIVING (ADL)
ADLS_ACUITY_SCORE: 44
ADLS_ACUITY_SCORE: 47
ADLS_ACUITY_SCORE: 41
ADLS_ACUITY_SCORE: 44
ADLS_ACUITY_SCORE: 40
ADLS_ACUITY_SCORE: 47
ADLS_ACUITY_SCORE: 41
ADLS_ACUITY_SCORE: 41
ADLS_ACUITY_SCORE: 44
ADLS_ACUITY_SCORE: 41
ADLS_ACUITY_SCORE: 47

## 2025-08-10 ENCOUNTER — LAB REQUISITION (OUTPATIENT)
Dept: LAB | Facility: CLINIC | Age: 70
End: 2025-08-10
Payer: MEDICARE

## 2025-08-10 DIAGNOSIS — Z11.1 ENCOUNTER FOR SCREENING FOR RESPIRATORY TUBERCULOSIS: ICD-10-CM

## 2025-08-11 PROCEDURE — 36415 COLL VENOUS BLD VENIPUNCTURE: CPT | Mod: ORL | Performed by: NURSE PRACTITIONER

## 2025-08-11 PROCEDURE — 86481 TB AG RESPONSE T-CELL SUSP: CPT | Mod: ORL | Performed by: NURSE PRACTITIONER

## 2025-08-12 ENCOUNTER — TRANSITIONAL CARE UNIT VISIT (OUTPATIENT)
Dept: GERIATRICS | Facility: CLINIC | Age: 70
End: 2025-08-12
Payer: MEDICARE

## 2025-08-12 ENCOUNTER — LAB REQUISITION (OUTPATIENT)
Dept: LAB | Facility: CLINIC | Age: 70
End: 2025-08-12
Payer: MEDICARE

## 2025-08-12 ENCOUNTER — DOCUMENTATION ONLY (OUTPATIENT)
Dept: OTHER | Facility: CLINIC | Age: 70
End: 2025-08-12

## 2025-08-12 VITALS
DIASTOLIC BLOOD PRESSURE: 67 MMHG | TEMPERATURE: 98.1 F | OXYGEN SATURATION: 97 % | HEART RATE: 95 BPM | SYSTOLIC BLOOD PRESSURE: 106 MMHG | BODY MASS INDEX: 43.08 KG/M2 | HEIGHT: 61 IN | WEIGHT: 228.2 LBS | RESPIRATION RATE: 18 BRPM

## 2025-08-12 DIAGNOSIS — Z96.651 STATUS POST TOTAL RIGHT KNEE REPLACEMENT: ICD-10-CM

## 2025-08-12 DIAGNOSIS — Z96.651 S/P TOTAL KNEE ARTHROPLASTY, RIGHT: ICD-10-CM

## 2025-08-12 DIAGNOSIS — M21.371 RIGHT FOOT DROP: ICD-10-CM

## 2025-08-12 DIAGNOSIS — I10 ESSENTIAL HYPERTENSION: ICD-10-CM

## 2025-08-12 DIAGNOSIS — E78.00 HYPERCHOLESTEROLEMIA: ICD-10-CM

## 2025-08-12 DIAGNOSIS — Z47.89 AFTERCARE FOLLOWING SURGERY OF THE MUSCULOSKELETAL SYSTEM: ICD-10-CM

## 2025-08-12 DIAGNOSIS — N18.32 STAGE 3B CHRONIC KIDNEY DISEASE (H): ICD-10-CM

## 2025-08-12 DIAGNOSIS — M62.81 GENERALIZED MUSCLE WEAKNESS: ICD-10-CM

## 2025-08-12 DIAGNOSIS — M17.11 PRIMARY OSTEOARTHRITIS OF RIGHT KNEE: ICD-10-CM

## 2025-08-12 DIAGNOSIS — D64.9 ANEMIA, UNSPECIFIED TYPE: ICD-10-CM

## 2025-08-12 DIAGNOSIS — E66.813 CLASS 3 SEVERE OBESITY WITH SERIOUS COMORBIDITY AND BODY MASS INDEX (BMI) OF 40.0 TO 44.9 IN ADULT, UNSPECIFIED OBESITY TYPE (H): ICD-10-CM

## 2025-08-12 DIAGNOSIS — I10 PRIMARY HYPERTENSION: ICD-10-CM

## 2025-08-12 DIAGNOSIS — R53.81 PHYSICAL DECONDITIONING: Primary | ICD-10-CM

## 2025-08-12 DIAGNOSIS — R52 PAIN: ICD-10-CM

## 2025-08-12 LAB
GAMMA INTERFERON BACKGROUND BLD IA-ACNC: 0.05 IU/ML
M TB IFN-G BLD-IMP: NEGATIVE
M TB IFN-G CD4+ BCKGRND COR BLD-ACNC: 9.95 IU/ML
MITOGEN IGNF BCKGRD COR BLD-ACNC: 0.02 IU/ML
MITOGEN IGNF BCKGRD COR BLD-ACNC: 0.07 IU/ML
QUANTIFERON MITOGEN: 10 IU/ML
QUANTIFERON NIL TUBE: 0.05 IU/ML
QUANTIFERON TB1 TUBE: 0.07 IU/ML
QUANTIFERON TB2 TUBE: 0.12

## 2025-08-12 PROCEDURE — 99310 SBSQ NF CARE HIGH MDM 45: CPT | Performed by: NURSE PRACTITIONER

## 2025-08-12 RX ORDER — LISINOPRIL AND HYDROCHLOROTHIAZIDE 20; 25 MG/1; MG/1
2 TABLET ORAL DAILY
Status: SHIPPED
Start: 2025-08-12

## 2025-08-12 RX ORDER — ASPIRIN 81 MG/1
81 TABLET ORAL 2 TIMES DAILY
Status: SHIPPED
Start: 2025-08-12 | End: 2025-09-05

## 2025-08-12 RX ORDER — AMOXICILLIN 250 MG
1 CAPSULE ORAL 2 TIMES DAILY PRN
Status: SHIPPED
Start: 2025-08-12

## 2025-08-12 RX ORDER — OXYCODONE HYDROCHLORIDE 5 MG/1
5 TABLET ORAL EVERY 6 HOURS PRN
Qty: 30 TABLET | Refills: 0 | Status: SHIPPED | OUTPATIENT
Start: 2025-08-12

## 2025-08-12 RX ORDER — ACETAMINOPHEN 325 MG/1
TABLET ORAL
Qty: 240 TABLET | Refills: 1 | Status: SHIPPED | OUTPATIENT
Start: 2025-08-12

## 2025-08-14 VITALS
DIASTOLIC BLOOD PRESSURE: 85 MMHG | SYSTOLIC BLOOD PRESSURE: 150 MMHG | RESPIRATION RATE: 18 BRPM | TEMPERATURE: 98.6 F | WEIGHT: 228.2 LBS | HEART RATE: 74 BPM | HEIGHT: 61 IN | OXYGEN SATURATION: 94 % | BODY MASS INDEX: 43.08 KG/M2

## 2025-08-15 ENCOUNTER — TRANSITIONAL CARE UNIT VISIT (OUTPATIENT)
Dept: GERIATRICS | Facility: CLINIC | Age: 70
End: 2025-08-15
Payer: MEDICARE

## 2025-08-15 DIAGNOSIS — Z47.89 AFTERCARE FOLLOWING SURGERY OF THE MUSCULOSKELETAL SYSTEM: ICD-10-CM

## 2025-08-15 DIAGNOSIS — M62.81 GENERALIZED MUSCLE WEAKNESS: ICD-10-CM

## 2025-08-15 DIAGNOSIS — N18.32 STAGE 3B CHRONIC KIDNEY DISEASE (H): ICD-10-CM

## 2025-08-15 DIAGNOSIS — R52 PAIN: ICD-10-CM

## 2025-08-15 DIAGNOSIS — M21.371 RIGHT FOOT DROP: ICD-10-CM

## 2025-08-15 DIAGNOSIS — Z96.651 STATUS POST TOTAL RIGHT KNEE REPLACEMENT: ICD-10-CM

## 2025-08-15 DIAGNOSIS — E66.813 CLASS 3 SEVERE OBESITY WITH SERIOUS COMORBIDITY AND BODY MASS INDEX (BMI) OF 40.0 TO 44.9 IN ADULT, UNSPECIFIED OBESITY TYPE (H): ICD-10-CM

## 2025-08-15 DIAGNOSIS — R53.81 PHYSICAL DECONDITIONING: Primary | ICD-10-CM

## 2025-08-15 DIAGNOSIS — E78.00 HYPERCHOLESTEROLEMIA: ICD-10-CM

## 2025-08-15 DIAGNOSIS — I10 PRIMARY HYPERTENSION: ICD-10-CM

## 2025-08-15 DIAGNOSIS — D64.9 ANEMIA, UNSPECIFIED TYPE: ICD-10-CM

## 2025-08-15 DIAGNOSIS — Z96.651 S/P TOTAL KNEE ARTHROPLASTY, RIGHT: ICD-10-CM

## 2025-08-15 DIAGNOSIS — M17.11 PRIMARY OSTEOARTHRITIS OF RIGHT KNEE: ICD-10-CM

## 2025-08-18 ENCOUNTER — TRANSITIONAL CARE UNIT VISIT (OUTPATIENT)
Dept: GERIATRICS | Facility: CLINIC | Age: 70
End: 2025-08-18
Payer: MEDICARE

## 2025-08-18 ENCOUNTER — LAB REQUISITION (OUTPATIENT)
Dept: LAB | Facility: CLINIC | Age: 70
End: 2025-08-18
Payer: MEDICARE

## 2025-08-18 ENCOUNTER — TRANSFERRED RECORDS (OUTPATIENT)
Dept: HEALTH INFORMATION MANAGEMENT | Facility: CLINIC | Age: 70
End: 2025-08-18

## 2025-08-18 VITALS
WEIGHT: 228.2 LBS | TEMPERATURE: 98.1 F | HEART RATE: 77 BPM | BODY MASS INDEX: 43.08 KG/M2 | RESPIRATION RATE: 18 BRPM | DIASTOLIC BLOOD PRESSURE: 70 MMHG | HEIGHT: 61 IN | SYSTOLIC BLOOD PRESSURE: 114 MMHG | OXYGEN SATURATION: 93 %

## 2025-08-18 DIAGNOSIS — D64.9 ANEMIA, UNSPECIFIED: ICD-10-CM

## 2025-08-18 DIAGNOSIS — N18.30 CHRONIC KIDNEY DISEASE, STAGE 3 UNSPECIFIED (H): ICD-10-CM

## 2025-08-18 DIAGNOSIS — I10 ESSENTIAL HYPERTENSION: ICD-10-CM

## 2025-08-18 DIAGNOSIS — N18.32 STAGE 3B CHRONIC KIDNEY DISEASE (H): ICD-10-CM

## 2025-08-18 DIAGNOSIS — Z96.651 S/P TOTAL KNEE ARTHROPLASTY, RIGHT: ICD-10-CM

## 2025-08-18 DIAGNOSIS — R52 PAIN: ICD-10-CM

## 2025-08-18 DIAGNOSIS — M17.11 PRIMARY OSTEOARTHRITIS OF RIGHT KNEE: ICD-10-CM

## 2025-08-18 DIAGNOSIS — Z47.89 AFTERCARE FOLLOWING SURGERY OF THE MUSCULOSKELETAL SYSTEM: ICD-10-CM

## 2025-08-18 DIAGNOSIS — M21.371 RIGHT FOOT DROP: ICD-10-CM

## 2025-08-18 DIAGNOSIS — M62.81 GENERALIZED MUSCLE WEAKNESS: ICD-10-CM

## 2025-08-18 DIAGNOSIS — I10 ESSENTIAL (PRIMARY) HYPERTENSION: ICD-10-CM

## 2025-08-18 DIAGNOSIS — R53.81 PHYSICAL DECONDITIONING: Primary | ICD-10-CM

## 2025-08-18 DIAGNOSIS — E78.00 HYPERCHOLESTEROLEMIA: ICD-10-CM

## 2025-08-18 DIAGNOSIS — D64.9 ANEMIA, UNSPECIFIED TYPE: ICD-10-CM

## 2025-08-18 DIAGNOSIS — Z96.651 STATUS POST TOTAL RIGHT KNEE REPLACEMENT: ICD-10-CM

## 2025-08-18 DIAGNOSIS — I10 PRIMARY HYPERTENSION: ICD-10-CM

## 2025-08-18 DIAGNOSIS — E66.813 CLASS 3 SEVERE OBESITY WITH SERIOUS COMORBIDITY AND BODY MASS INDEX (BMI) OF 40.0 TO 44.9 IN ADULT, UNSPECIFIED OBESITY TYPE (H): ICD-10-CM

## 2025-08-18 PROCEDURE — 99309 SBSQ NF CARE MODERATE MDM 30: CPT | Performed by: NURSE PRACTITIONER

## 2025-08-18 RX ORDER — LISINOPRIL AND HYDROCHLOROTHIAZIDE 20; 25 MG/1; MG/1
1 TABLET ORAL DAILY
Status: SHIPPED
Start: 2025-08-18 | End: 2025-08-20

## 2025-08-19 VITALS
HEART RATE: 78 BPM | OXYGEN SATURATION: 99 % | HEIGHT: 61 IN | WEIGHT: 228.4 LBS | DIASTOLIC BLOOD PRESSURE: 61 MMHG | BODY MASS INDEX: 43.12 KG/M2 | RESPIRATION RATE: 18 BRPM | SYSTOLIC BLOOD PRESSURE: 115 MMHG | TEMPERATURE: 98.4 F

## 2025-08-19 LAB
ANION GAP SERPL CALCULATED.3IONS-SCNC: 13 MMOL/L (ref 7–15)
BASOPHILS # BLD AUTO: 0.04 10E3/UL (ref 0–0.2)
BASOPHILS NFR BLD AUTO: 0.6 %
BUN SERPL-MCNC: 32.4 MG/DL (ref 8–23)
CALCIUM SERPL-MCNC: 10.2 MG/DL (ref 8.8–10.4)
CHLORIDE SERPL-SCNC: 104 MMOL/L (ref 98–107)
CREAT SERPL-MCNC: 1.19 MG/DL (ref 0.51–0.95)
EGFRCR SERPLBLD CKD-EPI 2021: 50 ML/MIN/1.73M2
EOSINOPHIL # BLD AUTO: 0.1 10E3/UL (ref 0–0.7)
EOSINOPHIL NFR BLD AUTO: 1.4 %
ERYTHROCYTE [DISTWIDTH] IN BLOOD BY AUTOMATED COUNT: 13 % (ref 10–15)
GLUCOSE SERPL-MCNC: 105 MG/DL (ref 70–99)
HCO3 SERPL-SCNC: 22 MMOL/L (ref 22–29)
HCT VFR BLD AUTO: 34.1 % (ref 35–47)
HGB BLD-MCNC: 10.9 G/DL (ref 11.7–15.7)
IMM GRANULOCYTES # BLD: 0.03 10E3/UL
IMM GRANULOCYTES NFR BLD: 0.4 %
LYMPHOCYTES # BLD AUTO: 1.64 10E3/UL (ref 0.8–5.3)
LYMPHOCYTES NFR BLD AUTO: 23.8 %
MCH RBC QN AUTO: 32.5 PG (ref 26.5–33)
MCHC RBC AUTO-ENTMCNC: 32 G/DL (ref 31.5–36.5)
MCV RBC AUTO: 101.8 FL (ref 78–100)
MONOCYTES # BLD AUTO: 0.46 10E3/UL (ref 0–1.3)
MONOCYTES NFR BLD AUTO: 6.7 %
NEUTROPHILS # BLD AUTO: 4.63 10E3/UL (ref 1.6–8.3)
NEUTROPHILS NFR BLD AUTO: 67.1 %
NRBC # BLD AUTO: <0.03 10E3/UL
NRBC BLD AUTO-RTO: 0 /100
PLATELET # BLD AUTO: 395 10E3/UL (ref 150–450)
POTASSIUM SERPL-SCNC: 4 MMOL/L (ref 3.4–5.3)
RBC # BLD AUTO: 3.35 10E6/UL (ref 3.8–5.2)
SODIUM SERPL-SCNC: 139 MMOL/L (ref 135–145)
WBC # BLD AUTO: 6.9 10E3/UL (ref 4–11)

## 2025-08-20 ENCOUNTER — DISCHARGE SUMMARY NURSING HOME (OUTPATIENT)
Dept: GERIATRICS | Facility: CLINIC | Age: 70
End: 2025-08-20
Payer: MEDICARE

## 2025-08-20 DIAGNOSIS — J40 BRONCHITIS: ICD-10-CM

## 2025-08-20 DIAGNOSIS — R52 PAIN: ICD-10-CM

## 2025-08-20 DIAGNOSIS — I10 PRIMARY HYPERTENSION: ICD-10-CM

## 2025-08-20 DIAGNOSIS — E66.813 CLASS 3 SEVERE OBESITY WITH SERIOUS COMORBIDITY AND BODY MASS INDEX (BMI) OF 40.0 TO 44.9 IN ADULT, UNSPECIFIED OBESITY TYPE (H): ICD-10-CM

## 2025-08-20 DIAGNOSIS — Z96.651 S/P TOTAL KNEE ARTHROPLASTY, RIGHT: ICD-10-CM

## 2025-08-20 DIAGNOSIS — M17.11 PRIMARY OSTEOARTHRITIS OF RIGHT KNEE: ICD-10-CM

## 2025-08-20 DIAGNOSIS — M21.371 RIGHT FOOT DROP: ICD-10-CM

## 2025-08-20 DIAGNOSIS — R53.81 PHYSICAL DECONDITIONING: Primary | ICD-10-CM

## 2025-08-20 DIAGNOSIS — M62.81 GENERALIZED MUSCLE WEAKNESS: ICD-10-CM

## 2025-08-20 DIAGNOSIS — E78.00 HYPERCHOLESTEROLEMIA: ICD-10-CM

## 2025-08-20 DIAGNOSIS — D64.9 ANEMIA, UNSPECIFIED TYPE: ICD-10-CM

## 2025-08-20 DIAGNOSIS — I10 ESSENTIAL HYPERTENSION: ICD-10-CM

## 2025-08-20 DIAGNOSIS — N18.32 STAGE 3B CHRONIC KIDNEY DISEASE (H): ICD-10-CM

## 2025-08-20 DIAGNOSIS — Z96.651 STATUS POST TOTAL RIGHT KNEE REPLACEMENT: ICD-10-CM

## 2025-08-20 DIAGNOSIS — K21.9 GASTROESOPHAGEAL REFLUX DISEASE WITHOUT ESOPHAGITIS: ICD-10-CM

## 2025-08-20 DIAGNOSIS — Z47.89 AFTERCARE FOLLOWING SURGERY OF THE MUSCULOSKELETAL SYSTEM: ICD-10-CM

## 2025-08-20 PROCEDURE — 99316 NF DSCHRG MGMT 30 MIN+: CPT | Performed by: NURSE PRACTITIONER

## 2025-08-20 RX ORDER — ROSUVASTATIN CALCIUM 10 MG/1
10 TABLET, COATED ORAL EVERY OTHER DAY
Qty: 15 TABLET | Refills: 0 | Status: SHIPPED | OUTPATIENT
Start: 2025-08-20

## 2025-08-20 RX ORDER — LIDOCAINE 50 MG/G
OINTMENT TOPICAL EVERY 4 HOURS PRN
Qty: 50 G | Refills: 0 | Status: SHIPPED | OUTPATIENT
Start: 2025-08-20

## 2025-08-20 RX ORDER — AMOXICILLIN 250 MG
1 CAPSULE ORAL 2 TIMES DAILY PRN
Qty: 30 TABLET | Refills: 0 | Status: SHIPPED | OUTPATIENT
Start: 2025-08-20

## 2025-08-20 RX ORDER — LISINOPRIL AND HYDROCHLOROTHIAZIDE 20; 25 MG/1; MG/1
1 TABLET ORAL DAILY
Qty: 30 TABLET | Refills: 0 | Status: SHIPPED | OUTPATIENT
Start: 2025-08-20

## 2025-08-20 RX ORDER — OMEPRAZOLE 20 MG/1
20 CAPSULE, DELAYED RELEASE ORAL DAILY
Qty: 30 CAPSULE | Refills: 0 | Status: SHIPPED | OUTPATIENT
Start: 2025-08-20

## 2025-08-20 RX ORDER — ACETAMINOPHEN 325 MG/1
TABLET ORAL
Qty: 240 TABLET | Refills: 0 | Status: SHIPPED | OUTPATIENT
Start: 2025-08-20

## 2025-08-20 RX ORDER — METHOCARBAMOL 500 MG/1
250 TABLET, FILM COATED ORAL 4 TIMES DAILY
Qty: 60 TABLET | Refills: 0 | Status: SHIPPED | OUTPATIENT
Start: 2025-08-20

## 2025-08-20 RX ORDER — ALBUTEROL SULFATE 90 UG/1
2 INHALANT RESPIRATORY (INHALATION) EVERY 6 HOURS PRN
Qty: 18 G | Refills: 0 | Status: SHIPPED | OUTPATIENT
Start: 2025-08-20

## 2025-08-20 RX ORDER — ASPIRIN 81 MG/1
81 TABLET ORAL 2 TIMES DAILY
Qty: 24 TABLET | Refills: 0 | Status: SHIPPED | OUTPATIENT
Start: 2025-08-20 | End: 2025-09-01

## 2025-08-20 RX ORDER — MULTIVITAMIN
1 TABLET ORAL DAILY
Qty: 30 TABLET | Refills: 0 | Status: SHIPPED | OUTPATIENT
Start: 2025-08-20

## (undated) DEVICE — VIAL DECANTER STERILE WHITE DYNJDEC06

## (undated) DEVICE — GLOVE BIOGEL PI SZ 7.0 40870

## (undated) DEVICE — CUFF TOURN 34IN STRL DISP

## (undated) DEVICE — SUTURE VICRYL+ 1 27IN CT-1 UND VCP261H

## (undated) DEVICE — SU STRATAFIX PDS PLUS 1 CT-1 18" SXPP1A404

## (undated) DEVICE — BLADE SAW SAGITTAL STRK DUAL CUT 4118-135-090

## (undated) DEVICE — SUCTION MANIFOLD NEPTUNE 2 SYS 4 PORT 0702-020-000

## (undated) DEVICE — SU DERMABOND ADVANCED .7ML DNX12

## (undated) DEVICE — SUTURE MONOCRYL 3-0 18 PS2 UND MCP497G

## (undated) DEVICE — CUSTOM PACK TOTAL KNEE ACCESSORY SOP5BTAHEA

## (undated) DEVICE — SOLUTION WATER 1000ML BOTTLE R5000-01

## (undated) DEVICE — SUTURE VICRYL+ 2-0 27IN CT-1 UND VCP259H

## (undated) DEVICE — GLOVE UNDER INDICATOR PI SZ 7.0 LF 41670

## (undated) DEVICE — GOWN IMPERVIOUS BREATHABLE SMART LG 89015

## (undated) DEVICE — DRESSING FOAM MEPILEX BORDER AG 12X4 POSTOP 498600

## (undated) DEVICE — SOLUTION IRRIGATION 0.9% NACL 1000ML BOTTLE R5200-01

## (undated) DEVICE — CUSTOM PACK TOTAL KNEE SOP5BTKHEC

## (undated) DEVICE — HOLDER LIMB VELCRO OR 0814-1533

## (undated) DEVICE — HOOD SURG T7 PLUS STRL LF DISP 0416-801-200

## (undated) DEVICE — SOLUTION IV 0.9% NACL 1000ML E8000

## (undated) DEVICE — ELECTRODE PATIENT RETURN ADULT L10 FT 2 PLATE CORD 0855C

## (undated) RX ORDER — PROPOFOL 10 MG/ML
INJECTION, EMULSION INTRAVENOUS
Status: DISPENSED
Start: 2025-08-06

## (undated) RX ORDER — DEXAMETHASONE SODIUM PHOSPHATE 4 MG/ML
INJECTION, SOLUTION INTRA-ARTICULAR; INTRALESIONAL; INTRAMUSCULAR; INTRAVENOUS; SOFT TISSUE
Status: DISPENSED
Start: 2025-08-06

## (undated) RX ORDER — FENTANYL CITRATE-0.9 % NACL/PF 10 MCG/ML
PLASTIC BAG, INJECTION (ML) INTRAVENOUS
Status: DISPENSED
Start: 2025-08-06

## (undated) RX ORDER — ONDANSETRON 2 MG/ML
INJECTION INTRAMUSCULAR; INTRAVENOUS
Status: DISPENSED
Start: 2025-08-06

## (undated) RX ORDER — LIDOCAINE HYDROCHLORIDE 10 MG/ML
INJECTION, SOLUTION EPIDURAL; INFILTRATION; INTRACAUDAL; PERINEURAL
Status: DISPENSED
Start: 2025-08-06